# Patient Record
Sex: FEMALE | Race: WHITE | NOT HISPANIC OR LATINO | Employment: OTHER | URBAN - METROPOLITAN AREA
[De-identification: names, ages, dates, MRNs, and addresses within clinical notes are randomized per-mention and may not be internally consistent; named-entity substitution may affect disease eponyms.]

---

## 2017-01-23 ENCOUNTER — PATIENT MESSAGE (OUTPATIENT)
Dept: RHEUMATOLOGY | Facility: CLINIC | Age: 67
End: 2017-01-23

## 2017-02-09 ENCOUNTER — OFFICE VISIT (OUTPATIENT)
Dept: RHEUMATOLOGY | Facility: CLINIC | Age: 67
End: 2017-02-09
Payer: COMMERCIAL

## 2017-02-09 VITALS
DIASTOLIC BLOOD PRESSURE: 79 MMHG | WEIGHT: 187.13 LBS | SYSTOLIC BLOOD PRESSURE: 129 MMHG | BODY MASS INDEX: 31.95 KG/M2 | HEIGHT: 64 IN | HEART RATE: 65 BPM

## 2017-02-09 DIAGNOSIS — F40.10 SOCIAL ANXIETY DISORDER: ICD-10-CM

## 2017-02-09 DIAGNOSIS — M35.00 SICCA: ICD-10-CM

## 2017-02-09 DIAGNOSIS — F41.0 PANIC DISORDER: ICD-10-CM

## 2017-02-09 DIAGNOSIS — M70.62 TROCHANTERIC BURSITIS OF BOTH HIPS: ICD-10-CM

## 2017-02-09 DIAGNOSIS — I10 HYPERTENSION, ESSENTIAL: ICD-10-CM

## 2017-02-09 DIAGNOSIS — Z98.1 S/P LUMBAR SPINAL FUSION: ICD-10-CM

## 2017-02-09 DIAGNOSIS — Z98.1 S/P CERVICAL SPINAL FUSION: ICD-10-CM

## 2017-02-09 DIAGNOSIS — F33.1 MDD (MAJOR DEPRESSIVE DISORDER), RECURRENT EPISODE, MODERATE: ICD-10-CM

## 2017-02-09 DIAGNOSIS — N28.9 RENAL INSUFFICIENCY: ICD-10-CM

## 2017-02-09 DIAGNOSIS — M79.7 FIBROMYALGIA: Primary | ICD-10-CM

## 2017-02-09 DIAGNOSIS — M70.61 TROCHANTERIC BURSITIS OF BOTH HIPS: ICD-10-CM

## 2017-02-09 PROCEDURE — 99213 OFFICE O/P EST LOW 20 MIN: CPT | Mod: S$GLB,,, | Performed by: INTERNAL MEDICINE

## 2017-02-09 PROCEDURE — 1125F AMNT PAIN NOTED PAIN PRSNT: CPT | Mod: S$GLB,,, | Performed by: INTERNAL MEDICINE

## 2017-02-09 PROCEDURE — 1160F RVW MEDS BY RX/DR IN RCRD: CPT | Mod: S$GLB,,, | Performed by: INTERNAL MEDICINE

## 2017-02-09 PROCEDURE — 99999 PR PBB SHADOW E&M-EST. PATIENT-LVL IV: CPT | Mod: PBBFAC,,, | Performed by: INTERNAL MEDICINE

## 2017-02-09 PROCEDURE — 3078F DIAST BP <80 MM HG: CPT | Mod: S$GLB,,, | Performed by: INTERNAL MEDICINE

## 2017-02-09 PROCEDURE — 1159F MED LIST DOCD IN RCRD: CPT | Mod: S$GLB,,, | Performed by: INTERNAL MEDICINE

## 2017-02-09 PROCEDURE — 1157F ADVNC CARE PLAN IN RCRD: CPT | Mod: S$GLB,,, | Performed by: INTERNAL MEDICINE

## 2017-02-09 PROCEDURE — 3074F SYST BP LT 130 MM HG: CPT | Mod: S$GLB,,, | Performed by: INTERNAL MEDICINE

## 2017-02-09 RX ORDER — PREGABALIN 75 MG/1
75 CAPSULE ORAL 2 TIMES DAILY
Qty: 60 CAPSULE | Refills: 2 | Status: SHIPPED | OUTPATIENT
Start: 2017-02-09 | End: 2017-05-16 | Stop reason: SDUPTHER

## 2017-02-09 RX ORDER — DIAZEPAM 5 MG/1
5 TABLET ORAL EVERY 6 HOURS PRN
COMMUNITY
End: 2017-05-16

## 2017-02-09 RX ORDER — ASPIRIN 81 MG/1
81 TABLET ORAL DAILY
Status: ON HOLD | COMMUNITY
End: 2021-06-03 | Stop reason: HOSPADM

## 2017-02-09 RX ORDER — GABAPENTIN 300 MG/1
300 CAPSULE ORAL 2 TIMES DAILY
COMMUNITY
End: 2017-02-09

## 2017-02-09 RX ORDER — AMITRIPTYLINE HYDROCHLORIDE 25 MG/1
25 TABLET, FILM COATED ORAL NIGHTLY
COMMUNITY
End: 2020-05-28

## 2017-02-09 ASSESSMENT — ROUTINE ASSESSMENT OF PATIENT INDEX DATA (RAPID3)
PATIENT GLOBAL ASSESSMENT SCORE: 9
PAIN SCORE: 10
FATIGUE SCORE: 10
TOTAL RAPID3 SCORE: 8.66
AM STIFFNESS SCORE: 1, YES
MDHAQ FUNCTION SCORE: 2.1
PSYCHOLOGICAL DISTRESS SCORE: 7.7
WHEN YOU AWAKENED IN THE MORNING OVER THE LAST WEEK, PLEASE INDICATE THE AMOUNT OF TIME IT TAKES UNTIL YOU ARE AS LIMBER AS YOU WILL BE FOR THE DAY: 3 MINS

## 2017-02-09 NOTE — MR AVS SNAPSHOT
[facility]  [address]  [facility] [address]  Phone: [phone]  Fax: [phone]                  Missy Ennis   2017 10:00 AM   Office Visit    Description:  Female : 1950   Provider:  Kali Llamas MD   Department:  [facility]           Diagnoses this Visit        Comments    Fibromyalgia    -  Primary     S/P cervical spinal fusion         S/P lumbar spinal fusion         Renal insufficiency         Hypertension, essential         Trochanteric bursitis of both hips                To Do List           Goals (5 Years of Data)     None      Follow-Up and Disposition     Return in about 3 months (around 2017).       These Medications        Disp Refills Start End    pregabalin (LYRICA) 75 MG capsule 60 capsule 2 2017 8/10/2017    Take 1 capsule (75 mg total) by mouth 2 (two) times daily. - Oral    Pharmacy: [facility] [address] [place] [id number] Ph #: [phone]         [facility]     [facility] Care [facility] -  Assistance  Registered nurses in the [facility] provide clinical advisement, health education, appointment booking, and other advisory services.  Call for this free service at [phone].             Medications           Message regarding Medications     Verify the changes and/or additions to your medication regime listed below are the same as discussed with your clinician today.  If any of these changes or additions are incorrect, please notify your healthcare provider.        STOP taking these medications     aspirin 325 MG tablet Take 1 tablet (325 mg total) by mouth once daily.    gabapentin (NEURONTIN) 300 MG capsule Take 300 mg by mouth 2 (two) times daily.           Verify that the below list of medications is an accurate representation of the medications you are currently taking.  If none reported, the list may be blank. If incorrect, please contact your  "healthcare provider. Carry this list with you in case of emergency.           Current Medications     amitriptyline (ELAVIL) 25 MG tablet Take 25 mg by mouth nightly as needed for Insomnia.    aspirin (ECOTRIN) 81 MG EC tablet Take 81 mg by mouth once daily.    atorvastatin (LIPITOR) 40 MG tablet Take 40 mg by mouth once daily.    carvedilol (COREG) 6.25 MG tablet Take 6.25 mg by mouth 2 (two) times daily with meals.    clopidogrel (PLAVIX) 75 mg tablet Take 75 mg by mouth once daily.    diazePAM (VALIUM) 5 MG tablet Take 5 mg by mouth every 6 (six) hours as needed for Anxiety.    glucosamine-chondroitin 500-400 mg tablet Take 1 tablet by mouth 3 (three) times daily.    hydrocodone-acetaminophen 5-325mg (NORCO) 5-325 mg per tablet Take 1 tablet by mouth every 6 (six) hours as needed for Pain.    levothyroxine (SYNTHROID) 88 MCG tablet Take 88 mcg by mouth once daily.    metformin (GLUCOPHAGE) 500 MG tablet Take 1 tablet (500 mg total) by mouth daily with breakfast.    MULTIVIT-IRON-MIN-FOLIC ACID 3,500-18-0.4 UNIT-MG-MG ORAL CHEW Take by mouth.    venlafaxine 75 mg TR24 Take 75 mg by mouth once daily.    VIMPAT 100 mg Tab Take 1 tablet by mouth 2 (two) times daily.    pregabalin (LYRICA) 75 MG capsule Take 1 capsule (75 mg total) by mouth 2 (two) times daily.           Clinical Reference Information           Your Vitals Were     BP Pulse Height Weight Last Period BMI    129/79 65 5' 3.6" (1.615 m) 84.9 kg (187 lb 1.6 oz) 04/18/2016 32.52 kg/m2      Blood Pressure          Most Recent Value    BP  129/79      Allergies as of 2/9/2017     Codeine    Morphine    Sulfa (Sulfonamide Antibiotics)      Immunizations Administered on Date of Encounter - 2/9/2017     None      Orders Placed During Today's Visit      Normal Orders This Visit    Ambulatory Referral to Physical/Occupational Therapy       Instructions    - Will try to get Lyrica 75 mg BID. If approved, stop gabapentin.  - Ask PCP about switching from Effexor to " Cymbalta  - Speak with pain specialist about SI injections  - Ordered more physical therapy  - Follow up with Dr. Coello with fluid collection typical of seroma/hematoma seen on MRI  - Follow up with Dr. Llamas in 3 months  Understanding Trochanteric Bursitis    A bursa is a thin, slippery, sac-like film. It contains a small amount of fluid. This structure is found between bones and soft tissues in and around joints. A bursa cushions and protects a joint. It keeps parts of a joint from rubbing against each other. If a bursa becomes inflamed and irritated, it is known as bursitis.  The trochanteric bursa is found on the hip joint. It lies on top of the bump at the top of the thighbone called the greater trochanter. Inflammation of this bursa is called trochanteric bursitis.     How to say it  [id number]   Causes of trochanteric bursitis  Causes may include:  · Overuse of the hip during running or other sports, dance, or work  · Falling on or irritation to the side of the hip  This condition may occur along with other problems, such as osteoarthritis of the hip or knee, or low back problems. In rare cases, it may occur after hip surgery.  Symptoms of trochanteric bursitis  · Pain or aching on the side of the hip. The pain may travel down the leg.  · Swelling, tenderness, or warmth on the side of the hip at the bony bump at the top of the thigh  Treatment for trochanteric bursitis  These may include:  · Resting the hip. This allows the bursa to heal.  · Prescription or over-the-counter pain medicines. These help reduce inflammation, swelling, and pain.  · Cold packs and heat packs. These help reduce pain and swelling.  · Stretching and strengthening exercises. These improve flexibility and strength around the hip.  · Physical therapy. This includes exercises or other treatments.  · Injections of medicine into the bursa. This may help reduce inflammation and relieve symptoms.  Possible complications  If you dont  give your hip time to heal, the problem may not go away, may return, or may get worse. Rest and treat your hip as directed.     When to call your healthcare provider  Call your healthcare provider right away if you have any of these:  · Fever of 100.4°F (38°C) or higher, or as directed  · Redness, swelling, or warmth that gets worse  · Symptoms that dont get better with prescribed medicines, or get worse  · New symptoms   Date Last Reviewed: 3/29/2016  © 0603-3304 [employer]. [address] [place] [address]. All rights reserved. This information is not intended as a substitute for professional medical care. Always follow your healthcare professional's instructions.        Side Lying Hip Abduction (Strength)    1. Lie down on the floor on your side. Rest your head on your arm. Bend your legs at the knees.  2. Keep your feet together and lift your top leg up so that your knees are . Keep your hips steady.     3. Slowly lower your leg back down.  4. Repeat 10 times, or as instructed.  5. Switch sides if instructed.     Challenge yourself  Put an elastic band or tubing around your thighs. Raise and lower your top leg slowly and steadily.      Date Last Reviewed: 3/29/2016  © 6533-8628 [employer]. [address] [place] [address]. All rights reserved. This information is not intended as a substitute for professional medical care. Always follow your healthcare professional's instructions.        Iliotibial Band Stretch (Flexibility)    6. Stand next to a chair. Hold onto the chair with your right hand for support. Cross your right leg behind your left leg.  7. Lean your right hip toward the right. Feel the stretch at the outside of your hip.  8. Hold for 30 to 60 seconds. Then relax.  9. Repeat 2 times, or as instructed.  10. Switch sides and repeat.  11. Do this 3 times a day, or as instructed.     Tip: Dont bend forward or twist at the waist.   Date Last  Reviewed: 3/29/2016  © 5271-5448 [employer]. [address] [place] [address], [place] [address]. All rights reserved. This information is not intended as a substitute for professional medical care. Always follow your healthcare professional's instructions.             Language Assistance Services     ATTENTION: Language assistance services are available, free of charge. Please call [phone].      ATENCIÓN: Si habla español, tiene a agustin disposición servicios gratuitos de asistencia lingüística. Llame al [phone].     CHÚ Ý: N?u b?n nói Ti?ng Vi?t, có các d?ch v? h? tr? ngôn ng? mi?n phí dành cho b?n. G?i s? [phone].         [facility] complies with applicable Federal civil rights laws and does not discriminate on the basis of race, color, national origin, age, disability, or sex.

## 2017-02-09 NOTE — PATIENT INSTRUCTIONS
- Will try to get Lyrica 75 mg BID. If approved, stop gabapentin.  - Ask PCP about switching from Effexor to Cymbalta  - Speak with pain specialist about SI injections  - Ordered more physical therapy  - Follow up with Dr. Coello with fluid collection typical of seroma/hematoma seen on MRI  - Follow up with Dr. Llamas in 3 months  Understanding Trochanteric Bursitis    A bursa is a thin, slippery, sac-like film. It contains a small amount of fluid. This structure is found between bones and soft tissues in and around joints. A bursa cushions and protects a joint. It keeps parts of a joint from rubbing against each other. If a bursa becomes inflamed and irritated, it is known as bursitis.  The trochanteric bursa is found on the hip joint. It lies on top of the bump at the top of the thighbone called the greater trochanter. Inflammation of this bursa is called trochanteric bursitis.     How to say it  [id number]   Causes of trochanteric bursitis  Causes may include:  · Overuse of the hip during running or other sports, dance, or work  · Falling on or irritation to the side of the hip  This condition may occur along with other problems, such as osteoarthritis of the hip or knee, or low back problems. In rare cases, it may occur after hip surgery.  Symptoms of trochanteric bursitis  · Pain or aching on the side of the hip. The pain may travel down the leg.  · Swelling, tenderness, or warmth on the side of the hip at the bony bump at the top of the thigh  Treatment for trochanteric bursitis  These may include:  · Resting the hip. This allows the bursa to heal.  · Prescription or over-the-counter pain medicines. These help reduce inflammation, swelling, and pain.  · Cold packs and heat packs. These help reduce pain and swelling.  · Stretching and strengthening exercises. These improve flexibility and strength around the hip.  · Physical therapy. This includes exercises or other treatments.  · Injections of medicine  into the bursa. This may help reduce inflammation and relieve symptoms.  Possible complications  If you dont give your hip time to heal, the problem may not go away, may return, or may get worse. Rest and treat your hip as directed.     When to call your healthcare provider  Call your healthcare provider right away if you have any of these:  · Fever of 100.4°F (38°C) or higher, or as directed  · Redness, swelling, or warmth that gets worse  · Symptoms that dont get better with prescribed medicines, or get worse  · New symptoms   Date Last Reviewed: 3/29/2016  © 2888-0928 [employer]. [address] [place] [address]. All rights reserved. This information is not intended as a substitute for professional medical care. Always follow your healthcare professional's instructions.        Side Lying Hip Abduction (Strength)    1. Lie down on the floor on your side. Rest your head on your arm. Bend your legs at the knees.  2. Keep your feet together and lift your top leg up so that your knees are . Keep your hips steady.     3. Slowly lower your leg back down.  4. Repeat 10 times, or as instructed.  5. Switch sides if instructed.     Challenge yourself  Put an elastic band or tubing around your thighs. Raise and lower your top leg slowly and steadily.      Date Last Reviewed: 3/29/2016  © 7333-4756 [employer]. [address] [place] [address]. All rights reserved. This information is not intended as a substitute for professional medical care. Always follow your healthcare professional's instructions.        Iliotibial Band Stretch (Flexibility)    6. Stand next to a chair. Hold onto the chair with your right hand for support. Cross your right leg behind your left leg.  7. Lean your right hip toward the right. Feel the stretch at the outside of your hip.  8. Hold for 30 to 60 seconds. Then relax.  9. Repeat 2 times, or as instructed.  10. Switch sides and  repeat.  11. Do this 3 times a day, or as instructed.     Tip: Dont bend forward or twist at the waist.   Date Last Reviewed: 3/29/2016  © 0065-4088 [employer]. [address] [place] [address], [place] [address]. All rights reserved. This information is not intended as a substitute for professional medical care. Always follow your healthcare professional's instructions.

## 2017-02-09 NOTE — PROGRESS NOTES
"Subjective:       Patient ID: Missy Ennis is a 67 y.o. female.    Chief Complaint: No chief complaint on file.    HPI   Pt is a 68 y/o female with a h/o OA of the hips, knees, and hand; s/p left TKA; s/p C4-C7 fusion; s/p L4-S1 fusion and surgical site seroma/hematoma; fibromyalgia; HTN; hypothyroidism; HLD; VENKAT on CPAP; s/p right MCA embolic stroke; CKD; MDD, panic d/o, social anxiety d/o; and T2DM who presents for follow up visit. At her last visit on 10/21/16, she was diagnosed with fibromyalgia and it was recommended for her to start Lyrica 75 mg BID. However, her insurance has not approved it. She is taking gabapentin 600 mg BID and cannot tolerate a higher dose due to side effects (made her "loopy"). She does not feel like gabapentin is helping her fibromyalgia pain. She is Effexor given to her by her PCP for neuropathic pain. She has not tried taking Cymbalta. Today, she reports that she is having pain in her low back and the lateral aspects of both hips. The hip pain makes it painful to lie on either side. She has been getting dry needling of the IT band at PT. She has not ever received trochanteric bursa injections. Her blood glucose was 175 this morning. She has not seen Dr. Coello for the fluid collection seen in her back on MRI which was typical of seroma/hematoma.       Review of Systems   Constitutional: Negative for chills, fever and unexpected weight change.   HENT: Positive for trouble swallowing. Negative for mouth sores.         Dry mouth   Eyes: Negative for pain and redness.        Dry eyes   Respiratory: Positive for shortness of breath. Negative for cough and choking.    Cardiovascular: Negative for chest pain.   Gastrointestinal: Negative for abdominal pain, blood in stool, constipation, diarrhea and nausea.        Heartburn   Endocrine: Negative for cold intolerance and heat intolerance.   Genitourinary: Negative for dysuria, genital sores and hematuria.   Musculoskeletal: Negative for " myalgias.   Skin: Negative for color change, pallor and rash.   Neurological: Positive for headaches. Negative for weakness and numbness.   Hematological: Negative for adenopathy. Does not bruise/bleed easily.   Psychiatric/Behavioral: Negative for dysphoric mood and suicidal ideas.         Objective:     Visit Vitals    LMP 04/18/2016        Physical Exam   Constitutional: She is oriented to person, place, and time and well-developed, well-nourished, and in no distress. No distress.   HENT:   Head: Normocephalic and atraumatic.   Eyes: Conjunctivae and EOM are normal. Pupils are equal, round, and reactive to light. Right eye exhibits no discharge. Left eye exhibits no discharge. No scleral icterus.   Neck: Normal range of motion.   Cardiovascular: Normal rate, regular rhythm, normal heart sounds and intact distal pulses.  Exam reveals no gallop and no friction rub.    No murmur heard.  Pulmonary/Chest: Effort normal and breath sounds normal. No respiratory distress. She has no wheezes. She has no rales.   Abdominal: Soft. Bowel sounds are normal. She exhibits no distension. There is no tenderness.       Right Side Rheumatological Exam     Examination finds the shoulder, elbow, wrist, knee, 1st PIP, 1st MCP, 2nd PIP, 2nd MCP, 3rd PIP, 3rd MCP, 4th PIP, 4th MCP, 5th PIP and 5th MCP normal.    Hip Exam   Tenderness Location: greater trochanter    Muscle Strength (0-5 scale):  Neck Flexion:  5  Neck Extension: 5  Deltoid:  4.6  Biceps: 4.8/5   Triceps:  4.8  : 5/5   Iliopsoas: 4.4  Quadriceps:  5   Distal Lower Extremity: 5    Left Side Rheumatological Exam     Examination finds the shoulder, elbow, wrist, knee, 1st PIP, 1st MCP, 2nd PIP, 2nd MCP, 3rd PIP, 3rd MCP, 4th PIP, 4th MCP, 5th PIP and 5th MCP normal.    Hip Exam   Tenderness Location: greater trochanter    Muscle Strength (0-5 scale):  Neck Flexion:  5  Neck Extension: 5  Deltoid:  4.8  Biceps: 4.8/5   Triceps:  4.8  :  5/5   Iliopsoas:  4.4  Quadriceps:  5   Distal Lower Extremity: 5      Back/Neck Exam   Tenderness Right paramedian tenderness of the SI Joint.Left paramedian tenderness of the SI Joint.      Comments:  Positive ERWIN bilaterally.    Neurological: She is alert and oriented to person, place, and time. She has normal reflexes. A cranial nerve deficit is present. Gait normal.   Right Henning's Sign:  absent  Left Henning's Sign:  Absent  Reflex Scores:       Tricep reflexes are 2+ on the right side and 2+ on the left side.       Bicep reflexes are 2+ on the right side and 2+ on the left side.       Brachioradialis reflexes are 2+ on the right side and 2+ on the left side.       Patellar reflexes are 2+ on the right side and 2+ on the left side.       Achilles reflexes are 2+ on the right side and 2+ on the left side.  Facial droop   Skin: Skin is warm and dry. No rash noted. She is not diaphoretic. No erythema.     Psychiatric: Mood, memory, affect and judgment normal.   Musculoskeletal: Normal range of motion.           Assessment:       1. Fibromyalgia    2. S/P cervical spinal fusion    3. S/P lumbar spinal fusion    4. Renal insufficiency    5. Hypertension, essential    6. Trochanteric bursitis of both hips            Plan:       - Will attempt to go through [facility] for Lyrica 75 mg BID. Patient has failed gabapentin 600 mg BID and cannot tolerate a higher dose.  - Patient will speak with PCP about switching from Effexor to Cymbalta  - Weight reduction  - Continue glucosamine-chondroitin 500-400 tid  - Due to elevated blood glucose of 175 this morning, will not inject trochanteric bursae today, but may consider in the future. Patient provided with handouts on strengthening and stretching exercises for trochanteric bursitis.  - Follow up with pain specialist for evaluation for SI joint injections.  - Provided with orders for continued physical therapy  - F/u Dr. Coello for post lumbar fusion left lumbar  radiculopathy and check regarding the seroma/hematoma noted on MRI from Feb/  - RTC 3 months

## 2017-02-09 NOTE — PROGRESS NOTES
I have personally taken the history and examined the patient and agree with the resident,s note as stated above        Results for YESSY HYATT (MRN [id number]) as of 2/9/2017 12:43   Ref. Range 10/21/2016 14:43 11/3/2016 15:00 11/7/2016 13:28   WBC Latest Ref Range: 3.90 - 12.70 K/uL 6.90     RBC Latest Ref Range: 4.00 - 5.40 M/uL 4.30     Hemoglobin Latest Ref Range: 12.0 - 16.0 g/dL 13.3     Hematocrit Latest Ref Range: 37.0 - 48.5 % 39.0     MCV Latest Ref Range: 82 - 98 fL 91     MCH Latest Ref Range: 27.0 - 31.0 pg 30.9     MCHC Latest Ref Range: 32.0 - 36.0 % 34.1     RDW Latest Ref Range: 11.5 - 14.5 % 13.6     Platelets Latest Ref Range: 150 - 350 K/uL 261     MPV Latest Ref Range: 9.2 - 12.9 fL 11.1     Gran% Latest Ref Range: 38.0 - 73.0 % 64.2     Gran # Latest Ref Range: 1.8 - 7.7 K/uL 4.4     Lymph% Latest Ref Range: 18.0 - 48.0 % 19.9     Lymph # Latest Ref Range: 1.0 - 4.8 K/uL 1.4     Mono% Latest Ref Range: 4.0 - 15.0 % 8.1     Mono # Latest Ref Range: 0.3 - 1.0 K/uL 0.6     Eosinophil% Latest Ref Range: 0.0 - 8.0 % 6.7     Eos # Latest Ref Range: 0.0 - 0.5 K/uL 0.5     Basophil% Latest Ref Range: 0.0 - 1.9 % 1.0     Baso # Latest Ref Range: 0.00 - 0.20 K/uL 0.07     Sed Rate Latest Ref Range: 0 - 20 mm/Hr 17     Sodium Latest Ref Range: 136 - 145 mmol/L 139     Potassium Latest Ref Range: 3.5 - 5.1 mmol/L 3.9     Chloride Latest Ref Range: 95 - 110 mmol/L 101     CO2 Latest Ref Range: 23 - 29 mmol/L 28     Anion Gap Latest Ref Range: 8 - 16 mmol/L 10     BUN, Bld Latest Ref Range: 8 - 23 mg/dL 13     Creatinine Latest Ref Range: 0.5 - 1.4 mg/dL 0.8     eGFR if non African American Latest Ref Range: >60 mL/min/1.73 m^2 >60.0     eGFR if African American Latest Ref Range: >60 mL/min/1.73 m^2 >60.0     Glucose Latest Ref Range: 70 - 110 mg/dL 119 (H)     Calcium Latest Ref Range: 8.7 - 10.5 mg/dL 9.9     Alkaline Phosphatase Latest Ref Range: 55 - 135 U/L 116     Total Protein Latest Ref Range: 6.0  - 8.4 g/dL 6.8     Albumin Latest Ref Range: 3.5 - 5.2 g/dL 3.9     Total Bilirubin Latest Ref Range: 0.1 - 1.0 mg/dL 0.3     AST Latest Ref Range: 10 - 40 U/L 22     ALT Latest Ref Range: 10 - 44 U/L 29     CRP Latest Ref Range: 0.0 - 8.2 mg/L 4.1     CPK Latest Ref Range: 20 - 180 U/L 127     Vit D, 25-Hydroxy Latest Ref Range: 30 - 96 ng/mL 36     Anti-SSA Antibody Latest Ref Range: 0.00 - 19.99 EU 0.75     Anti-SSA Interpretation Latest Ref Range: Negative  Negative     Anti-SSB Antibody Latest Ref Range: 0.00 - 19.99 EU 0.08     Anti-SSB Interpretation Latest Ref Range: Negative  Negative     CCP Antibodies Latest Ref Range: <5.0 U/mL <0.5     Rheumatoid Factor Latest Ref Range: 0.0 - 15.0 IU/mL <10.0     XRAY PREVIOUS Unknown  Rpt    DEXA BONE DENSITY SPINE HIP Unknown   Rpt   MARYCHUY Screen Latest Ref Range: Negative <1:160  Negative <1:160     Differential Method Unknown Automated       The bone density is normal. RJQ   External Result Report   External Result Report   Narrative   : 1950 ORDERING PHYSICIAN: ION Llamas LOCATION: [facility] [place]    HISTORY: 65 y/o female with no hx of fractures. She had surgical menopause at 46 y/o. She is taking Calcium and Vit D supplements. She has a hx of Diabetes. She does not exercise or smoke.    TECHNIQUE: Bone Mineral Density performed using Hologic Horizon A (S/N [id number]) reveals good positioning of lumbar spine and hip.    BONE MINERAL DENSITY RESULTS:  Lumbar Spine: Lumbar bone mineral density L1-L4 is 0.529g/cm2, which is a t-score of -0.9. The z-score is 0.8.    Total Hip: The total hip bone mineral density is 0.874g/cm2.  The t-score is -0.6, and the z-score is 0.8.  Femoral neck BMD is 0.794g/cm2 and the t-score is -0.5.      COMPARISONS: No Previous studies available.   Impression       Normal BMD of total hip and 1/3 distal radius.     RECOMMENDATIONS:  1) Adequate calcium and Vitamin D therapy  2) Appropriate exercise  3) No need to repeat BMD in near  future unless clinical change.     EXPLANATION OF RESULTS:  The t-score compares this results to the bone density of a 25 year old of the same gender. The z-score compares this result to the average bone density to people of the same age and gender. The amounts indicate the number of standard deviations above or below the mean.  * Osteoporosis is generally defined as having a t-score between less than -2.5.  * Osteopenia is generally defined as having a t-score between -1 and -2.5.  * The normal range is generally defined as having a t-score between -1 to 1.      Electronically signed by: ANDRE MAXWELL MD  Date: 11/08/16  Time: 15:50     Encounter   View Encounter      Reviewed By   Kali Llamas MD on 11/8/2016  4:09 PM   Exam Details   Performed Procedure Technologist Supporting Staff Performing Physician   DXA Bone Density Spine And Hip_Axial Skeleton Mary Crespo               post lumbar fusion L4-S1 with recurrent left lumbar radiculopathy, surgical site seroma/hematoma  S/p cervical fusion C4-7  s/p left TKA  OA hips, right knee, hands  s/p right mca embolic stroke   Mild carotid stenosis  T 2 DM  H/o MDD, panic disorder, social anxiety disorder insomnia, chronic pain  Hypothyroidism, Hashimoto's  vitiligo  Hypertension, controlled  Hyperlipidemia  Metabolic syndrome  VENKAT, on CPAP  Obesity  VENKAT        extend PT(ext)   Forward recent hip x-rays  Stop gabapentin and change to pregabalin starting 75mg twice daily and adjust as necessary(sent through [facility], co-pay card)  Dr. Valenzuela to consider changing venlafaxine to duloxetine to treat fibromyalgia, chronic musculoskeletal pain and depression.  Weight reduction  Glucosamine-chondroitin 500-400 tid  Fibromyalgia packet  F/u Dr. Coello for post lumbar fusion left lumbar radiculopathy and check regarding the seroma/hematoma noted on MRI from Feb/  RTC 3 months

## 2017-02-14 ENCOUNTER — PATIENT MESSAGE (OUTPATIENT)
Dept: RHEUMATOLOGY | Facility: CLINIC | Age: 67
End: 2017-02-14

## 2017-02-17 ENCOUNTER — TELEPHONE (OUTPATIENT)
Dept: RHEUMATOLOGY | Facility: CLINIC | Age: 67
End: 2017-02-17

## 2017-02-17 NOTE — TELEPHONE ENCOUNTER
Received office note 2/15/17 Dr. Thomas Richards @ [facility]:    T2DM  Cerebrovascular disease, left hemiparesis  Hypothyroidism  Dysthymic disorder  Dorsalgia; low back pain  Right trochanteric bursitis    Change Effexor to Cymbalta  Change gabapentin to Lyrica  OT RX  DM management      Labs 2/7/17:     Cbc: normal alc 1200 anc 4400  Cmp: glucose 194; creat 0.82 eGFR 74 otherwise normal    HDL 57 LDL 72  TSH, FTI normal  Microalbumin: < 7.4  HbA1C 7.9

## 2017-05-16 ENCOUNTER — HOSPITAL ENCOUNTER (OUTPATIENT)
Dept: RADIOLOGY | Facility: HOSPITAL | Age: 67
Discharge: HOME | End: 2017-05-16
Attending: INTERNAL MEDICINE
Payer: COMMERCIAL

## 2017-05-16 ENCOUNTER — PATIENT MESSAGE (OUTPATIENT)
Dept: RHEUMATOLOGY | Facility: CLINIC | Age: 67
End: 2017-05-16

## 2017-05-16 ENCOUNTER — TELEPHONE (OUTPATIENT)
Dept: RHEUMATOLOGY | Facility: CLINIC | Age: 67
End: 2017-05-16

## 2017-05-16 ENCOUNTER — OFFICE VISIT (OUTPATIENT)
Dept: RHEUMATOLOGY | Facility: CLINIC | Age: 67
End: 2017-05-16
Payer: COMMERCIAL

## 2017-05-16 VITALS
WEIGHT: 185.88 LBS | HEART RATE: 80 BPM | DIASTOLIC BLOOD PRESSURE: 82 MMHG | HEIGHT: 64 IN | SYSTOLIC BLOOD PRESSURE: 132 MMHG | BODY MASS INDEX: 31.73 KG/M2

## 2017-05-16 DIAGNOSIS — M35.00 SICCA, UNSPECIFIED TYPE: ICD-10-CM

## 2017-05-16 DIAGNOSIS — M25.551 BILATERAL HIP PAIN: Primary | ICD-10-CM

## 2017-05-16 DIAGNOSIS — E03.9 ACQUIRED HYPOTHYROIDISM: ICD-10-CM

## 2017-05-16 DIAGNOSIS — M25.552 BILATERAL HIP PAIN: ICD-10-CM

## 2017-05-16 DIAGNOSIS — Z98.1 S/P CERVICAL SPINAL FUSION: ICD-10-CM

## 2017-05-16 DIAGNOSIS — M79.7 FIBROMYALGIA: ICD-10-CM

## 2017-05-16 DIAGNOSIS — M25.552 BILATERAL HIP PAIN: Primary | ICD-10-CM

## 2017-05-16 DIAGNOSIS — Z98.1 S/P LUMBAR SPINAL FUSION: ICD-10-CM

## 2017-05-16 DIAGNOSIS — L80 VITILIGO: ICD-10-CM

## 2017-05-16 DIAGNOSIS — I63.411 EMBOLIC STROKE INVOLVING RIGHT MIDDLE CEREBRAL ARTERY: ICD-10-CM

## 2017-05-16 DIAGNOSIS — M25.551 BILATERAL HIP PAIN: ICD-10-CM

## 2017-05-16 PROCEDURE — 1159F MED LIST DOCD IN RCRD: CPT | Mod: S$GLB,,, | Performed by: INTERNAL MEDICINE

## 2017-05-16 PROCEDURE — 3079F DIAST BP 80-89 MM HG: CPT | Mod: S$GLB,,, | Performed by: INTERNAL MEDICINE

## 2017-05-16 PROCEDURE — 1160F RVW MEDS BY RX/DR IN RCRD: CPT | Mod: S$GLB,,, | Performed by: INTERNAL MEDICINE

## 2017-05-16 PROCEDURE — 73521 X-RAY EXAM HIPS BI 2 VIEWS: CPT | Mod: TC

## 2017-05-16 PROCEDURE — 1125F AMNT PAIN NOTED PAIN PRSNT: CPT | Mod: S$GLB,,, | Performed by: INTERNAL MEDICINE

## 2017-05-16 PROCEDURE — 73521 X-RAY EXAM HIPS BI 2 VIEWS: CPT | Mod: 26,,, | Performed by: RADIOLOGY

## 2017-05-16 PROCEDURE — 3075F SYST BP GE 130 - 139MM HG: CPT | Mod: S$GLB,,, | Performed by: INTERNAL MEDICINE

## 2017-05-16 PROCEDURE — 99214 OFFICE O/P EST MOD 30 MIN: CPT | Mod: S$GLB,,, | Performed by: INTERNAL MEDICINE

## 2017-05-16 PROCEDURE — 99999 PR PBB SHADOW E&M-EST. PATIENT-LVL IV: CPT | Mod: PBBFAC,,, | Performed by: INTERNAL MEDICINE

## 2017-05-16 PROCEDURE — 1157F ADVNC CARE PLAN IN RCRD: CPT | Mod: 8P,S$GLB,, | Performed by: INTERNAL MEDICINE

## 2017-05-16 RX ORDER — DULOXETIN HYDROCHLORIDE 60 MG/1
60 CAPSULE, DELAYED RELEASE ORAL DAILY
Qty: 30 CAPSULE | Refills: 2 | Status: SHIPPED | OUTPATIENT
Start: 2017-05-16 | End: 2017-08-22 | Stop reason: SDUPTHER

## 2017-05-16 RX ORDER — GABAPENTIN 100 MG/1
300 CAPSULE ORAL 2 TIMES DAILY
COMMUNITY
End: 2019-07-09 | Stop reason: DRUGHIGH

## 2017-05-16 RX ORDER — DULOXETIN HYDROCHLORIDE 30 MG/1
30 CAPSULE, DELAYED RELEASE ORAL DAILY
COMMUNITY
End: 2017-05-16

## 2017-05-16 RX ORDER — PREGABALIN 75 MG/1
75 CAPSULE ORAL 2 TIMES DAILY
Qty: 60 CAPSULE | Refills: 2 | Status: SHIPPED | OUTPATIENT
Start: 2017-05-16 | End: 2017-06-06 | Stop reason: SDUPTHER

## 2017-05-16 ASSESSMENT — ROUTINE ASSESSMENT OF PATIENT INDEX DATA (RAPID3)
WHEN YOU AWAKENED IN THE MORNING OVER THE LAST WEEK, PLEASE INDICATE THE AMOUNT OF TIME IT TAKES UNTIL YOU ARE AS LIMBER AS YOU WILL BE FOR THE DAY: 1 HR
PATIENT GLOBAL ASSESSMENT SCORE: 10
PSYCHOLOGICAL DISTRESS SCORE: 5.5
TOTAL RAPID3 SCORE: 8.66
MDHAQ FUNCTION SCORE: 1.8
FATIGUE SCORE: 10
PAIN SCORE: 10
AM STIFFNESS SCORE: 1, YES

## 2017-05-16 NOTE — PROGRESS NOTES
"Subjective:       Patient ID: Missy Ennis is a 67 y.o. female.    Chief Complaint: fibromyalgia, s/p cervical and lumbar fusion, trochanteric bursitis, generalized OA    HPI  Attended PT and great benefit with "dry needling" and exercise. States was nearly pain free x 3 wks. Worse since insurance stopped covering PT. Also Dr. Richards stopped venlafaxine and changed to duloxetine 30mg daily which she continues. The pregabalin apparently never received from [facility]. So she is now on gabapentin 100mg qid instead which is not helping. No aerobic exercise at present since PT ended. Has lost 2 #. Had trochanteric bursal injections in past, one helped 24 h one helped 1  Wk.  Review of Systems   Constitutional: Positive for fatigue. Negative for appetite change, fever and unexpected weight change.   HENT: Negative for mouth sores.         Dry mouth   Eyes: Negative for visual disturbance.   Respiratory: Negative for cough, shortness of breath and wheezing.    Cardiovascular: Negative for chest pain and palpitations.   Gastrointestinal: Negative for abdominal pain, anal bleeding, blood in stool, constipation, diarrhea, nausea and vomiting.   Genitourinary: Negative for dysuria, frequency and urgency.   Musculoskeletal: Negative for arthralgias, back pain, gait problem, joint swelling, myalgias, neck pain and neck stiffness.   Skin: Negative for rash.   Neurological: Positive for numbness. Negative for weakness and headaches.   Hematological: Negative for adenopathy. Does not bruise/bleed easily.   Psychiatric/Behavioral: Negative for sleep disturbance. The patient is not nervous/anxious.          Objective:   /82  Pulse 80  Ht 5' 3.6" (1.615 m)  Wt 84.3 kg (185 lb 14.4 oz)  LMP 04/18/2016  BMI 32.31 kg/m2     Physical Exam   Constitutional: She is oriented to person, place, and time and well-developed, well-nourished, and in no distress.   HENT:   Head: Normocephalic and atraumatic. "   Mouth/Throat: Oropharynx is clear and moist.   Eyes: Conjunctivae and EOM are normal.   Neck: Normal range of motion. Neck supple. No thyromegaly present.   Cardiovascular: Normal rate, regular rhythm, normal heart sounds and intact distal pulses.  Exam reveals no gallop and no friction rub.    No murmur heard.  Pulmonary/Chest: Breath sounds normal. She has no wheezes. She has no rales. She exhibits no tenderness.   Abdominal: Soft. She exhibits no distension and no mass. There is no tenderness.   obese       Right Side Rheumatological Exam     Examination finds the shoulder, elbow, wrist, knee, 1st MCP, 2nd PIP, 2nd MCP, 3rd PIP, 3rd MCP, 4th PIP, 4th MCP, 5th PIP and 5th MCP normal.    The patient has an enlarged 1st PIP, 1st CMC and 2nd DIP    Knee Exam   Patellofemoral Crepitus: positive  Effusion: negative  Warmth: negative    Hip Exam   Tenderness Location: greater trochanter    Range of Motion   Extension: normal   Flexion: normal   Internal Rotation: normal   External Rotation: normal   Abduction: normal   Adduction: normal     Left Side Rheumatological Exam     Examination finds the shoulder, elbow, wrist, knee, 1st MCP, 2nd PIP, 2nd MCP, 3rd PIP, 3rd MCP, 4th PIP, 4th MCP, 5th PIP and 5th MCP normal.    The patient has an enlarged 1st PIP, 1st CMC and 2nd DIP.    Knee Exam     Range of Motion   Extension: normal   Flexion: normal   Effusion: negative  Warmth: negative    Hip Exam   Tenderness Location: greater trochanter    Range of Motion   Extension: normal   Flexion: normal   Internal Rotation: normal   External Rotation: normal   Abduction: normal       Lymphadenopathy:     She has no cervical adenopathy.   Neurological: She is alert and oriented to person, place, and time. She displays normal reflexes. Gait normal.   Nl motor strength UE and LE bilateral   Musculoskeletal: She exhibits no edema.           Assessment:     post lumbar fusion L4-S1 with recurrent left lumbar radiculopathy, surgical  site seroma/hematoma  S/p cervical fusion C4-7  s/p left TKA  OA hips, right knee, hands  s/p right mca embolic stroke   Mild carotid stenosis  T 2 DM  H/o MDD, panic disorder, social anxiety disorder insomnia, chronic pain  Hypothyroidism, Hashimoto's  vitiligo  Hypertension, controlled  Hyperlipidemia  Metabolic syndrome  VENKAT, on CPAP  Obesity lost 2#  VENKAT             Plan:       extend PT(ext)  Including dry needling; then maintenance exercise with arthritis aquatics, treadmill, elliptical, stationary bike + TaiChi, yoga  Ap pelvis and hip x-rays today  change from gabapentin  to pregabalin starting 75mg twice daily and adjust as necessary(sent through [facility], co-pay card) once approved  Increase duloxetine to 60mg daily  Continue Weight reduction  Glucosamine-chondroitin 500-400 tid  Fibromyalgia packet  F/u Dr. Coello for post lumbar fusion left lumbar radiculopathy and check regarding the seroma/hematoma noted on MRI from Feb/  RTC 3 months

## 2017-05-16 NOTE — MR AVS SNAPSHOT
[facility]  [address]  [facility] [address]  Phone: [phone]  Fax: [phone]                  Missy Ennis   2017 2:00 PM   Office Visit    Description:  Female : 1950   Provider:  Kali Llamas MD   Department:  [facility]           Diagnoses this Visit        Comments    Bilateral hip pain    -  Primary     Fibromyalgia         Vitiligo         Sicca, unspecified type         S/P cervical spinal fusion         S/P lumbar spinal fusion         Acquired hypothyroidism         Embolic stroke involving right middle cerebral artery                To Do List           Goals (5 Years of Data)     None      Follow-Up and Disposition     Return in about 3 months (around 2017).       These Medications        Disp Refills Start End    pregabalin (LYRICA) 75 MG capsule 60 capsule 2 2017    Take 1 capsule (75 mg total) by mouth 2 (two) times daily. - Oral    Pharmacy: [address] Ph #: [phone]       duloxetine (CYMBALTA) 60 MG capsule 30 capsule 2 2017    Take 1 capsule (60 mg total) by mouth once daily. - Oral    Pharmacy: [address] Ph #: [phone]         [facility]     [facility] Care Line - 24/ Assistance  Unless otherwise directed by your provider, please contact [facility], our nurse care line that is available for / assistance.     Registered nurses in the [facility] Center provide: appointment scheduling, clinical advisement, health education, and other advisory services.  Call: [phone] (toll free)               Medications           Message regarding Medications     Verify the changes and/or additions to your medication regime listed below are the same as discussed with your clinician today.  If any of these changes or additions are incorrect, please  notify your healthcare provider.        START taking these NEW medications        Refills    duloxetine (CYMBALTA) 60 MG capsule 2    Sig: Take 1 capsule (60 mg total) by mouth once daily.    Class: Normal    Route: Oral      STOP taking these medications     clopidogrel (PLAVIX) 75 mg tablet Take 75 mg by mouth once daily.    diazePAM (VALIUM) 5 MG tablet Take 5 mg by mouth every 6 (six) hours as needed for Anxiety.    hydrocodone-acetaminophen 5-325mg (NORCO) 5-325 mg per tablet Take 1 tablet by mouth every 6 (six) hours as needed for Pain.    metformin (GLUCOPHAGE) 500 MG tablet Take 1 tablet (500 mg total) by mouth daily with breakfast.    venlafaxine 75 mg TR24 Take 75 mg by mouth once daily.    VIMPAT 100 mg Tab Take 1 tablet by mouth 2 (two) times daily.           Verify that the below list of medications is an accurate representation of the medications you are currently taking.  If none reported, the list may be blank. If incorrect, please contact your healthcare provider. Carry this list with you in case of emergency.           Current Medications     amitriptyline (ELAVIL) 25 MG tablet Take 25 mg by mouth nightly as needed for Insomnia.    aspirin (ECOTRIN) 81 MG EC tablet Take 81 mg by mouth once daily.    atorvastatin (LIPITOR) 40 MG tablet Take 40 mg by mouth once daily.    carvedilol (COREG) 6.25 MG tablet Take 6.25 mg by mouth 2 (two) times daily with meals.    empagliflozin (JARDIANCE) 25 mg Tab Take by mouth.    gabapentin (NEURONTIN) 100 MG capsule Take 100 mg by mouth 4 (four) times daily.    glucosamine-chondroitin 500-400 mg tablet Take 1 tablet by mouth 3 (three) times daily.    levothyroxine (SYNTHROID) 88 MCG tablet Take 88 mcg by mouth once daily.    MULTIVIT-IRON-MIN-FOLIC ACID 3,500-18-0.4 UNIT-MG-MG ORAL CHEW Take by mouth.    duloxetine (CYMBALTA) 60 MG capsule Take 1 capsule (60 mg total) by mouth once daily.    pregabalin (LYRICA) 75 MG capsule Take 1 capsule (75 mg total) by mouth 2  "(two) times daily.           Clinical Reference Information           Your Vitals Were     BP Pulse Height Weight Last Period BMI    132/82 80 5' 3.6" (1.615 m) 84.3 kg (185 lb 14.4 oz) 04/18/2016 32.31 kg/m2      Blood Pressure          Most Recent Value    BP  132/82      Allergies as of 5/16/2017     Codeine    Morphine    Sulfa (Sulfonamide Antibiotics)      Immunizations Administered on Date of Encounter - 5/16/2017     None      Orders Placed During Today's Visit      Normal Orders This Visit    Ambulatory Referral to Physical/Occupational Therapy     Future Labs/Procedures Expected by Expires    X-Ray Hips Bilateral 2 View Inc AP Pelvis  5/16/2017 5/16/2018      Instructions    Lyrica faxed to [facility], if don't hear in 48 hrs, call Vandana [phone] or message.        Language Assistance Services     ATTENTION: Language assistance services are available, free of charge. Please call [phone].      ATENCIÓN: Si habla español, tiene a agustin disposición servicios gratuitos de asistencia lingüística. Llame al [phone].     [facility] Ý: N?u b?n nói Ti?ng Vi?t, có các d?ch v? h? tr? ngôn ng? mi?n phí dành cho b?n. G?i s? [phone].         [facility] complies with applicable Federal civil rights laws and does not discriminate on the basis of race, color, national origin, age, disability, or sex.        "

## 2017-05-16 NOTE — PATIENT INSTRUCTIONS
Lyrica faxed to [facility], if don't hear in 48 hrs, call Vandana [phone] or message.

## 2017-05-16 NOTE — TELEPHONE ENCOUNTER
Message from daughter April:     Please note that I am unable to attend Missy's appt today, my stepfather is bringing her.  I know my mother's explanation of her HX and symptoms can be very chaotic, so I wish to offer the following notes for today's visit:       Her hip pain HAD subsided considerably with physical therapy and change of meds to Cymbalta.  But since her insurance discontinued her therapy coverage, her pain has increasingly became less tolerable.  She did not attended any of the occupation therapy, she will tell you it is an insurance issue-I am telling you it is not.  She has become more scattered with her thoughts and forgetful of how to do everyday activities and occurrences.  If you could impress upon her the importance of OT, I would appreciate it.     We have not received any word on the RX for Lyrica- which you were trying to run thru [facility] at last visit.       Since last visit:   She has seen Dr. Leos, neurologist, for a RX refill for Vimpat.  (I did not attend-but at that appt the  X-rayed her foot and did a TCD scan of her brain)   I DID attend follow up appt: TCD showed blood flow good,  recommend to discontinue Plavix; xray of foot showed normal arthritis in joints.       She has seen Dr. Richards, GP: He recommended discontinue Metformin to new diabetic RX (I do not have the name)       Please do not hesitate to contact me [phone].   Thank you, Soraya Clement.                                           Responsible Party

## 2017-06-06 ENCOUNTER — PATIENT MESSAGE (OUTPATIENT)
Dept: RHEUMATOLOGY | Facility: CLINIC | Age: 67
End: 2017-06-06

## 2017-06-06 DIAGNOSIS — M79.7 FIBROMYALGIA: ICD-10-CM

## 2017-06-06 RX ORDER — PREGABALIN 75 MG/1
75 CAPSULE ORAL 2 TIMES DAILY
Qty: 60 CAPSULE | Refills: 2 | Status: SHIPPED | OUTPATIENT
Start: 2017-06-06 | End: 2017-10-02 | Stop reason: SDUPTHER

## 2017-06-19 ENCOUNTER — PATIENT MESSAGE (OUTPATIENT)
Dept: RHEUMATOLOGY | Facility: CLINIC | Age: 67
End: 2017-06-19

## 2017-07-03 ENCOUNTER — TELEPHONE (OUTPATIENT)
Dept: RHEUMATOLOGY | Facility: CLINIC | Age: 67
End: 2017-07-03

## 2017-07-03 NOTE — TELEPHONE ENCOUNTER
Jaime, she needs to see her primary MD or orthopedist for post traumatic hip pain to be sure no occult fracture or other process. Thanks. JUVENCIO

## 2017-07-03 NOTE — TELEPHONE ENCOUNTER
Pt. Reports that she had a fall on last Thursday, and is now having pain between the hip and groin area on right side, and wandering what she should do. I verbalized to patient that Dr. Llamas is out of the office until Wednesday, and that she should call her PCP to see if they can see her today, or go to the ER,because she may need an x-ray. Pt. Verbalized understanding.

## 2017-08-22 DIAGNOSIS — M79.7 FIBROMYALGIA: ICD-10-CM

## 2017-08-22 RX ORDER — DULOXETIN HYDROCHLORIDE 60 MG/1
60 CAPSULE, DELAYED RELEASE ORAL DAILY
Qty: 30 CAPSULE | Refills: 2 | Status: SHIPPED | OUTPATIENT
Start: 2017-08-22 | End: 2019-05-07 | Stop reason: SDUPTHER

## 2017-09-06 ENCOUNTER — PATIENT MESSAGE (OUTPATIENT)
Dept: RHEUMATOLOGY | Facility: CLINIC | Age: 67
End: 2017-09-06

## 2017-10-02 DIAGNOSIS — M79.7 FIBROMYALGIA: ICD-10-CM

## 2017-10-02 RX ORDER — PREGABALIN 75 MG/1
75 CAPSULE ORAL 2 TIMES DAILY
Qty: 180 CAPSULE | Refills: 0 | Status: SHIPPED | OUTPATIENT
Start: 2017-10-02 | End: 2018-12-04

## 2017-10-03 ENCOUNTER — TELEPHONE (OUTPATIENT)
Dept: RHEUMATOLOGY | Facility: CLINIC | Age: 67
End: 2017-10-03

## 2018-12-04 ENCOUNTER — HOSPITAL ENCOUNTER (INPATIENT)
Facility: HOSPITAL | Age: 68
LOS: 6 days | Discharge: NURSING, REHAB OR LONG-TERM CARE | DRG: 065 | End: 2018-12-10
Attending: EMERGENCY MEDICINE | Admitting: PSYCHIATRY & NEUROLOGY
Payer: MEDICARE

## 2018-12-04 DIAGNOSIS — I63.9 CEREBELLAR STROKE, ACUTE: Primary | ICD-10-CM

## 2018-12-04 DIAGNOSIS — I63.411 EMBOLIC STROKE INVOLVING RIGHT MIDDLE CEREBRAL ARTERY: ICD-10-CM

## 2018-12-04 DIAGNOSIS — I63.9 CEREBROVASCULAR ACCIDENT (CVA), UNSPECIFIED MECHANISM: ICD-10-CM

## 2018-12-04 DIAGNOSIS — G93.89 BRAIN MASS: ICD-10-CM

## 2018-12-04 DIAGNOSIS — I63.9 STROKE: ICD-10-CM

## 2018-12-04 DIAGNOSIS — E03.9 ACQUIRED HYPOTHYROIDISM: ICD-10-CM

## 2018-12-04 DIAGNOSIS — I10 HYPERTENSION, ESSENTIAL: ICD-10-CM

## 2018-12-04 DIAGNOSIS — E11.9 CONTROLLED TYPE 2 DIABETES MELLITUS WITHOUT COMPLICATION, WITHOUT LONG-TERM CURRENT USE OF INSULIN: ICD-10-CM

## 2018-12-04 LAB
CREAT SERPL-MCNC: 0.7 MG/DL
EST. GFR  (AFRICAN AMERICAN): >60 ML/MIN/1.73 M^2
EST. GFR  (NON AFRICAN AMERICAN): >60 ML/MIN/1.73 M^2

## 2018-12-04 PROCEDURE — 82565 ASSAY OF CREATININE: CPT

## 2018-12-04 PROCEDURE — 12000002 HC ACUTE/MED SURGE SEMI-PRIVATE ROOM

## 2018-12-04 PROCEDURE — 25500020 PHARM REV CODE 255: Performed by: EMERGENCY MEDICINE

## 2018-12-04 PROCEDURE — 96365 THER/PROPH/DIAG IV INF INIT: CPT

## 2018-12-04 PROCEDURE — 82962 GLUCOSE BLOOD TEST: CPT

## 2018-12-04 PROCEDURE — 63600175 PHARM REV CODE 636 W HCPCS: Performed by: EMERGENCY MEDICINE

## 2018-12-04 PROCEDURE — 96372 THER/PROPH/DIAG INJ SC/IM: CPT | Mod: 59

## 2018-12-04 PROCEDURE — A9585 GADOBUTROL INJECTION: HCPCS | Performed by: EMERGENCY MEDICINE

## 2018-12-04 PROCEDURE — 99285 EMERGENCY DEPT VISIT HI MDM: CPT | Mod: ,,, | Performed by: EMERGENCY MEDICINE

## 2018-12-04 PROCEDURE — 96375 TX/PRO/DX INJ NEW DRUG ADDON: CPT

## 2018-12-04 PROCEDURE — 96366 THER/PROPH/DIAG IV INF ADDON: CPT

## 2018-12-04 PROCEDURE — 99285 EMERGENCY DEPT VISIT HI MDM: CPT | Mod: 25

## 2018-12-04 RX ORDER — GLIMEPIRIDE 2 MG/1
2 TABLET ORAL
COMMUNITY
End: 2019-10-09 | Stop reason: SDUPTHER

## 2018-12-04 RX ORDER — SODIUM CHLORIDE 9 MG/ML
INJECTION, SOLUTION INTRAVENOUS CONTINUOUS
Status: DISCONTINUED | OUTPATIENT
Start: 2018-12-05 | End: 2018-12-05

## 2018-12-04 RX ORDER — ACETAMINOPHEN 325 MG/1
650 TABLET ORAL EVERY 6 HOURS PRN
Status: DISCONTINUED | OUTPATIENT
Start: 2018-12-05 | End: 2018-12-10 | Stop reason: HOSPADM

## 2018-12-04 RX ORDER — GADOBUTROL 604.72 MG/ML
10 INJECTION INTRAVENOUS
Status: COMPLETED | OUTPATIENT
Start: 2018-12-04 | End: 2018-12-04

## 2018-12-04 RX ORDER — CARVEDILOL 6.25 MG/1
6.25 TABLET ORAL 2 TIMES DAILY WITH MEALS
Status: DISCONTINUED | OUTPATIENT
Start: 2018-12-05 | End: 2018-12-10 | Stop reason: HOSPADM

## 2018-12-04 RX ORDER — IBUPROFEN 200 MG
24 TABLET ORAL
Status: DISCONTINUED | OUTPATIENT
Start: 2018-12-05 | End: 2018-12-10 | Stop reason: HOSPADM

## 2018-12-04 RX ORDER — ASPIRIN 81 MG/1
81 TABLET ORAL DAILY
Status: DISCONTINUED | OUTPATIENT
Start: 2018-12-05 | End: 2018-12-10 | Stop reason: HOSPADM

## 2018-12-04 RX ORDER — LISINOPRIL 5 MG/1
5 TABLET ORAL DAILY
COMMUNITY
End: 2019-05-07

## 2018-12-04 RX ORDER — OMEPRAZOLE 40 MG/1
40 CAPSULE, DELAYED RELEASE ORAL DAILY
COMMUNITY
End: 2019-08-02 | Stop reason: ALTCHOICE

## 2018-12-04 RX ORDER — LACOSAMIDE 50 MG/1
150 TABLET ORAL DAILY
Status: DISCONTINUED | OUTPATIENT
Start: 2018-12-05 | End: 2018-12-10 | Stop reason: HOSPADM

## 2018-12-04 RX ORDER — ACETAMINOPHEN 10 MG/ML
1000 INJECTION, SOLUTION INTRAVENOUS
Status: COMPLETED | OUTPATIENT
Start: 2018-12-04 | End: 2018-12-04

## 2018-12-04 RX ORDER — GLUCAGON 1 MG
1 KIT INJECTION
Status: DISCONTINUED | OUTPATIENT
Start: 2018-12-05 | End: 2018-12-10 | Stop reason: HOSPADM

## 2018-12-04 RX ORDER — SODIUM CHLORIDE 0.9 % (FLUSH) 0.9 %
3 SYRINGE (ML) INJECTION EVERY 8 HOURS
Status: DISCONTINUED | OUTPATIENT
Start: 2018-12-05 | End: 2018-12-10 | Stop reason: HOSPADM

## 2018-12-04 RX ORDER — IBUPROFEN 200 MG
16 TABLET ORAL
Status: DISCONTINUED | OUTPATIENT
Start: 2018-12-05 | End: 2018-12-10 | Stop reason: HOSPADM

## 2018-12-04 RX ORDER — POLYETHYLENE GLYCOL 3350 17 G/17G
17 POWDER, FOR SOLUTION ORAL DAILY PRN
Status: DISCONTINUED | OUTPATIENT
Start: 2018-12-05 | End: 2018-12-10 | Stop reason: HOSPADM

## 2018-12-04 RX ORDER — INSULIN ASPART 100 [IU]/ML
1-10 INJECTION, SOLUTION INTRAVENOUS; SUBCUTANEOUS
Status: DISCONTINUED | OUTPATIENT
Start: 2018-12-05 | End: 2018-12-10 | Stop reason: HOSPADM

## 2018-12-04 RX ORDER — PROCHLORPERAZINE EDISYLATE 5 MG/ML
10 INJECTION INTRAMUSCULAR; INTRAVENOUS ONCE
Status: COMPLETED | OUTPATIENT
Start: 2018-12-04 | End: 2018-12-04

## 2018-12-04 RX ORDER — LEVOTHYROXINE SODIUM 88 UG/1
88 TABLET ORAL DAILY
Status: DISCONTINUED | OUTPATIENT
Start: 2018-12-05 | End: 2018-12-07

## 2018-12-04 RX ORDER — AMITRIPTYLINE HYDROCHLORIDE 25 MG/1
25 TABLET, FILM COATED ORAL NIGHTLY PRN
Status: DISCONTINUED | OUTPATIENT
Start: 2018-12-05 | End: 2018-12-10 | Stop reason: HOSPADM

## 2018-12-04 RX ORDER — ATORVASTATIN CALCIUM 20 MG/1
40 TABLET, FILM COATED ORAL DAILY
Status: DISCONTINUED | OUTPATIENT
Start: 2018-12-05 | End: 2018-12-10 | Stop reason: HOSPADM

## 2018-12-04 RX ORDER — GABAPENTIN 100 MG/1
100 CAPSULE ORAL 4 TIMES DAILY
Status: DISCONTINUED | OUTPATIENT
Start: 2018-12-05 | End: 2018-12-07

## 2018-12-04 RX ORDER — LACOSAMIDE 150 MG/1
150 TABLET ORAL DAILY
COMMUNITY
End: 2020-11-17

## 2018-12-04 RX ORDER — PIOGLITAZONEHYDROCHLORIDE 15 MG/1
15 TABLET ORAL DAILY
COMMUNITY
End: 2019-10-09 | Stop reason: SDUPTHER

## 2018-12-04 RX ORDER — ONDANSETRON 8 MG/1
8 TABLET, ORALLY DISINTEGRATING ORAL EVERY 8 HOURS PRN
Status: DISCONTINUED | OUTPATIENT
Start: 2018-12-05 | End: 2018-12-10 | Stop reason: HOSPADM

## 2018-12-04 RX ORDER — HEPARIN SODIUM 5000 [USP'U]/ML
5000 INJECTION, SOLUTION INTRAVENOUS; SUBCUTANEOUS EVERY 8 HOURS
Status: DISCONTINUED | OUTPATIENT
Start: 2018-12-05 | End: 2018-12-10 | Stop reason: HOSPADM

## 2018-12-04 RX ORDER — LISINOPRIL 5 MG/1
5 TABLET ORAL DAILY
Status: DISCONTINUED | OUTPATIENT
Start: 2018-12-05 | End: 2018-12-10 | Stop reason: HOSPADM

## 2018-12-04 RX ADMIN — ACETAMINOPHEN 1000 MG: 10 INJECTION, SOLUTION INTRAVENOUS at 09:12

## 2018-12-04 RX ADMIN — IOHEXOL 100 ML: 350 INJECTION, SOLUTION INTRAVENOUS at 10:12

## 2018-12-04 RX ADMIN — PROCHLORPERAZINE EDISYLATE 10 MG: 5 INJECTION INTRAMUSCULAR; INTRAVENOUS at 09:12

## 2018-12-04 RX ADMIN — GADOBUTROL 10 ML: 604.72 INJECTION INTRAVENOUS at 09:12

## 2018-12-05 LAB
ALBUMIN SERPL BCP-MCNC: 3.5 G/DL
ALP SERPL-CCNC: 115 U/L
ALT SERPL W/O P-5'-P-CCNC: 20 U/L
ANION GAP SERPL CALC-SCNC: 9 MMOL/L
APTT BLDCRRT: 22.9 SEC
ASCENDING AORTA: 3.79 CM
AST SERPL-CCNC: 27 U/L
AV INDEX (PROSTH): 1.03
AV MEAN GRADIENT: 8.43 MMHG
AV PEAK GRADIENT: 17.47 MMHG
AV VALVE AREA: 3.22 CM2
BASOPHILS # BLD AUTO: 0.05 K/UL
BASOPHILS NFR BLD: 0.6 %
BILIRUB SERPL-MCNC: 0.8 MG/DL
BUN SERPL-MCNC: 13 MG/DL
CALCIUM SERPL-MCNC: 9.5 MG/DL
CHLORIDE SERPL-SCNC: 104 MMOL/L
CHOLEST SERPL-MCNC: 178 MG/DL
CHOLEST/HDLC SERPL: 3.2 {RATIO}
CK MB SERPL-MCNC: 2.7 NG/ML
CK MB SERPL-RTO: 1.9 %
CK SERPL-CCNC: 145 U/L
CO2 SERPL-SCNC: 27 MMOL/L
CREAT SERPL-MCNC: 0.8 MG/DL
CV ECHO LV RWT: 0.55 CM
DIFFERENTIAL METHOD: ABNORMAL
DOP CALC AO PEAK VEL: 2.09 M/S
DOP CALC AO VTI: 29.27 CM
DOP CALC LVOT AREA: 3.11 CM2
DOP CALC LVOT DIAMETER: 1.99 CM
DOP CALC LVOT STROKE VOLUME: 94.16 CM3
DOP CALCLVOT PEAK VEL VTI: 30.29 CM
E WAVE DECELERATION TIME: 176.11 MSEC
E/A RATIO: 0.59
E/E' RATIO: 16.22
ECHO LV POSTERIOR WALL: 0.93 CM (ref 0.6–1.1)
EOSINOPHIL # BLD AUTO: 0.2 K/UL
EOSINOPHIL NFR BLD: 2.9 %
ERYTHROCYTE [DISTWIDTH] IN BLOOD BY AUTOMATED COUNT: 13.2 %
EST. GFR  (AFRICAN AMERICAN): >60 ML/MIN/1.73 M^2
EST. GFR  (NON AFRICAN AMERICAN): >60 ML/MIN/1.73 M^2
ESTIMATED AVG GLUCOSE: 123 MG/DL
FRACTIONAL SHORTENING: 29 % (ref 28–44)
GLUCOSE SERPL-MCNC: 118 MG/DL
HBA1C MFR BLD HPLC: 5.9 %
HCT VFR BLD AUTO: 43.3 %
HDLC SERPL-MCNC: 56 MG/DL
HDLC SERPL: 31.5 %
HGB BLD-MCNC: 13.8 G/DL
IMM GRANULOCYTES # BLD AUTO: 0.02 K/UL
IMM GRANULOCYTES NFR BLD AUTO: 0.2 %
INR PPP: 0.9
INTERVENTRICULAR SEPTUM: 1.01 CM (ref 0.6–1.1)
IVRT: 0.17 MSEC
LA MAJOR: 4.75 CM
LA MINOR: 4.65 CM
LA WIDTH: 3.22 CM
LDLC SERPL CALC-MCNC: 106 MG/DL
LEFT ATRIUM SIZE: 3.05 CM
LEFT ATRIUM VOLUME INDEX: 20.5 ML/M2
LEFT ATRIUM VOLUME: 39.23 CM3
LEFT INTERNAL DIMENSION IN SYSTOLE: 2.39 CM (ref 2.1–4)
LEFT VENTRICLE DIASTOLIC VOLUME INDEX: 24.64 ML/M2
LEFT VENTRICLE DIASTOLIC VOLUME: 47.14 ML
LEFT VENTRICLE MASS INDEX: 49.2 G/M2
LEFT VENTRICLE SYSTOLIC VOLUME INDEX: 10.4 ML/M2
LEFT VENTRICLE SYSTOLIC VOLUME: 19.95 ML
LEFT VENTRICULAR INTERNAL DIMENSION IN DIASTOLE: 3.39 CM (ref 3.5–6)
LEFT VENTRICULAR MASS: 94.17 G
LV LATERAL E/E' RATIO: 18.25
LV SEPTAL E/E' RATIO: 14.6
LYMPHOCYTES # BLD AUTO: 1.4 K/UL
LYMPHOCYTES NFR BLD: 17.7 %
MAGNESIUM SERPL-MCNC: 2.3 MG/DL
MCH RBC QN AUTO: 31.5 PG
MCHC RBC AUTO-ENTMCNC: 31.9 G/DL
MCV RBC AUTO: 99 FL
MONOCYTES # BLD AUTO: 0.7 K/UL
MONOCYTES NFR BLD: 8.8 %
MV PEAK A VEL: 1.24 M/S
MV PEAK E VEL: 0.73 M/S
NEUTROPHILS # BLD AUTO: 5.6 K/UL
NEUTROPHILS NFR BLD: 69.8 %
NONHDLC SERPL-MCNC: 122 MG/DL
NRBC BLD-RTO: 0 /100 WBC
PHOSPHATE SERPL-MCNC: 4.6 MG/DL
PISA TR MAX VEL: 2.09 M/S
PLATELET # BLD AUTO: 196 K/UL
PMV BLD AUTO: 11.3 FL
POCT GLUCOSE: 129 MG/DL (ref 70–110)
POCT GLUCOSE: 131 MG/DL (ref 70–110)
POCT GLUCOSE: 155 MG/DL (ref 70–110)
POCT GLUCOSE: 92 MG/DL (ref 70–110)
POTASSIUM SERPL-SCNC: 4 MMOL/L
PROT SERPL-MCNC: 7 G/DL
PROTHROMBIN TIME: 10 SEC
PULM VEIN S/D RATIO: 1.46
PV PEAK D VEL: 0.28 M/S
PV PEAK S VEL: 0.41 M/S
PV PEAK VELOCITY: 1.01 CM/S
RA MAJOR: 3.62 CM
RA PRESSURE: 3 MMHG
RA WIDTH: 1.82 CM
RBC # BLD AUTO: 4.38 M/UL
RIGHT VENTRICULAR END-DIASTOLIC DIMENSION: 2.61 CM
SINUS: 3.01 CM
SODIUM SERPL-SCNC: 140 MMOL/L
STJ: 2.7 CM
T4 FREE SERPL-MCNC: 1.02 NG/DL
TDI LATERAL: 0.04
TDI SEPTAL: 0.05
TDI: 0.05
TR MAX PG: 17.47 MMHG
TRICUSPID ANNULAR PLANE SYSTOLIC EXCURSION: 1.99 CM
TRIGL SERPL-MCNC: 80 MG/DL
TROPONIN I SERPL DL<=0.01 NG/ML-MCNC: <0.006 NG/ML
TSH SERPL DL<=0.005 MIU/L-ACNC: 5.22 UIU/ML
TV REST PULMONARY ARTERY PRESSURE: 20.47 MMHG
WBC # BLD AUTO: 8.04 K/UL

## 2018-12-05 PROCEDURE — G9175 SPEECH LANG GOAL STATUS: HCPCS | Mod: CI

## 2018-12-05 PROCEDURE — 85730 THROMBOPLASTIN TIME PARTIAL: CPT

## 2018-12-05 PROCEDURE — 92523 SPEECH SOUND LANG COMPREHEN: CPT

## 2018-12-05 PROCEDURE — A4216 STERILE WATER/SALINE, 10 ML: HCPCS | Performed by: NURSE PRACTITIONER

## 2018-12-05 PROCEDURE — 83036 HEMOGLOBIN GLYCOSYLATED A1C: CPT

## 2018-12-05 PROCEDURE — 97110 THERAPEUTIC EXERCISES: CPT

## 2018-12-05 PROCEDURE — 92610 EVALUATE SWALLOWING FUNCTION: CPT

## 2018-12-05 PROCEDURE — 97161 PT EVAL LOW COMPLEX 20 MIN: CPT

## 2018-12-05 PROCEDURE — 84439 ASSAY OF FREE THYROXINE: CPT

## 2018-12-05 PROCEDURE — 97162 PT EVAL MOD COMPLEX 30 MIN: CPT

## 2018-12-05 PROCEDURE — 85610 PROTHROMBIN TIME: CPT

## 2018-12-05 PROCEDURE — G8996 SWALLOW CURRENT STATUS: HCPCS | Mod: CI

## 2018-12-05 PROCEDURE — G9169 MEMORY GOAL STATUS: HCPCS | Mod: CI

## 2018-12-05 PROCEDURE — 85025 COMPLETE CBC W/AUTO DIFF WBC: CPT

## 2018-12-05 PROCEDURE — 82553 CREATINE MB FRACTION: CPT

## 2018-12-05 PROCEDURE — 95951 HC EEG MONITORING/VIDEO RECORD: CPT

## 2018-12-05 PROCEDURE — 99223 1ST HOSP IP/OBS HIGH 75: CPT | Mod: ,,, | Performed by: NURSE PRACTITIONER

## 2018-12-05 PROCEDURE — G8979 MOBILITY GOAL STATUS: HCPCS | Mod: CJ

## 2018-12-05 PROCEDURE — 63600175 PHARM REV CODE 636 W HCPCS: Performed by: NURSE PRACTITIONER

## 2018-12-05 PROCEDURE — 63600175 PHARM REV CODE 636 W HCPCS: Performed by: PSYCHIATRY & NEUROLOGY

## 2018-12-05 PROCEDURE — G9168 MEMORY CURRENT STATUS: HCPCS | Mod: CI

## 2018-12-05 PROCEDURE — 84100 ASSAY OF PHOSPHORUS: CPT

## 2018-12-05 PROCEDURE — 80053 COMPREHEN METABOLIC PANEL: CPT

## 2018-12-05 PROCEDURE — 84443 ASSAY THYROID STIM HORMONE: CPT

## 2018-12-05 PROCEDURE — 82550 ASSAY OF CK (CPK): CPT

## 2018-12-05 PROCEDURE — 84484 ASSAY OF TROPONIN QUANT: CPT

## 2018-12-05 PROCEDURE — G8997 SWALLOW GOAL STATUS: HCPCS | Mod: CI

## 2018-12-05 PROCEDURE — 83735 ASSAY OF MAGNESIUM: CPT

## 2018-12-05 PROCEDURE — G9174 SPEECH LANG CURRENT STATUS: HCPCS | Mod: CI

## 2018-12-05 PROCEDURE — 80061 LIPID PANEL: CPT

## 2018-12-05 PROCEDURE — G8978 MOBILITY CURRENT STATUS: HCPCS | Mod: CK

## 2018-12-05 PROCEDURE — 25000003 PHARM REV CODE 250: Performed by: NURSE PRACTITIONER

## 2018-12-05 PROCEDURE — 20600001 HC STEP DOWN PRIVATE ROOM

## 2018-12-05 PROCEDURE — 95951 PR EEG MONITORING/VIDEORECORD: CPT | Mod: 26,52,, | Performed by: PSYCHIATRY & NEUROLOGY

## 2018-12-05 PROCEDURE — 99222 1ST HOSP IP/OBS MODERATE 55: CPT | Mod: ,,, | Performed by: PHYSICIAN ASSISTANT

## 2018-12-05 RX ADMIN — HUMAN ALBUMIN MICROSPHERES AND PERFLUTREN 0.66 MG: 10; .22 INJECTION, SOLUTION INTRAVENOUS at 10:12

## 2018-12-05 RX ADMIN — LACOSAMIDE 150 MG: 50 TABLET, FILM COATED ORAL at 12:12

## 2018-12-05 RX ADMIN — HEPARIN SODIUM 5000 UNITS: 5000 INJECTION, SOLUTION INTRAVENOUS; SUBCUTANEOUS at 03:12

## 2018-12-05 RX ADMIN — LEVOTHYROXINE SODIUM 88 MCG: 88 TABLET ORAL at 12:12

## 2018-12-05 RX ADMIN — MULTIPLE VITAMINS W/ MINERALS TAB 1 TABLET: TAB at 12:12

## 2018-12-05 RX ADMIN — Medication 3 ML: at 06:12

## 2018-12-05 RX ADMIN — HEPARIN SODIUM 5000 UNITS: 5000 INJECTION, SOLUTION INTRAVENOUS; SUBCUTANEOUS at 06:12

## 2018-12-05 RX ADMIN — GABAPENTIN 100 MG: 100 CAPSULE ORAL at 12:12

## 2018-12-05 RX ADMIN — CARVEDILOL 6.25 MG: 6.25 TABLET, FILM COATED ORAL at 05:12

## 2018-12-05 RX ADMIN — SODIUM CHLORIDE: 0.9 INJECTION, SOLUTION INTRAVENOUS at 04:12

## 2018-12-05 RX ADMIN — HEPARIN SODIUM 5000 UNITS: 5000 INJECTION, SOLUTION INTRAVENOUS; SUBCUTANEOUS at 09:12

## 2018-12-05 RX ADMIN — SODIUM CHLORIDE: 0.9 INJECTION, SOLUTION INTRAVENOUS at 12:12

## 2018-12-05 RX ADMIN — LISINOPRIL 5 MG: 5 TABLET ORAL at 12:12

## 2018-12-05 RX ADMIN — ASPIRIN 81 MG: 81 TABLET, COATED ORAL at 12:12

## 2018-12-05 RX ADMIN — CARVEDILOL 6.25 MG: 6.25 TABLET, FILM COATED ORAL at 12:12

## 2018-12-05 RX ADMIN — GABAPENTIN 100 MG: 100 CAPSULE ORAL at 05:12

## 2018-12-05 RX ADMIN — GABAPENTIN 100 MG: 100 CAPSULE ORAL at 09:12

## 2018-12-05 RX ADMIN — ATORVASTATIN CALCIUM 40 MG: 20 TABLET, FILM COATED ORAL at 12:12

## 2018-12-05 RX ADMIN — Medication 3 ML: at 09:12

## 2018-12-05 RX ADMIN — ACETAMINOPHEN 650 MG: 325 TABLET, FILM COATED ORAL at 06:12

## 2018-12-05 NOTE — PLAN OF CARE
Problem: Patient Care Overview  Goal: Plan of Care Review  Recommendations     Recommendation/Intervention: 1. Provide Diabetic 2000kcal diet.  Goals: 1. Pt to consume >75% meals.    Assessment and Plan     Nutrition Problem  Inadequate oral intake     Related to (etiology):   Pt was NPO x 24 hrs     Signs and Symptoms (as evidenced by):   Pt with no meals since yesterday

## 2018-12-05 NOTE — HPI
67 y/o female with prior stroke who had hx of dizziness but last night it started to get worse and has nausea and vomiting. This morning it seemed to have gotten worse so she was taken to [facility] and CT scan showed possible brain mass so call was placed to neurosurgery and patient was transferred for neurosurgery evaluation. Upon arrival MRI Brain w w/o contrast done and acute infarct  in  Left cerebellum seen.  Patient still with nausea and dizziness. Patient has also been have episodes of falling and not knowing why, she has seen neurologist to get checked out for seizure activity and was placed on Vimpat.     Risk factors HTN, DM, prior stroke  Patient has had a loop recorder installed s/p PFO closure.  Loop recorder reported no afib after stroke , only tachycardia.

## 2018-12-05 NOTE — ED PROVIDER NOTES
Encounter Date: 12/4/2018    SCRIBE #1 NOTE: I, Alison Lim, am scribing for, and in the presence of,  Deandre Sadler MD. I have scribed the following portions of the note - Other sections scribed: HPI,ROS,PE.     I, Dr. Alcon Sadler, personally performed the services described in this documentation. All medical record entries made by the scribe were at my direction and in my presence.  I have reviewed the chart and agree that the record reflects my personal performance and is accurate and complete.  Alcon Sadler MD.  12:27 AM 12/05/2018      History     Chief Complaint   Patient presents with    Neuro transfer     Pt transferred from [facility] for neuro consult - pt has known brain lesion to right side, began having new symptoms of headache, left facial droop and vomiting last night. CT showed new lesion to left side. Pt AAOx4.     68 y.o. Female with PMHx of Thyroid disease, HTN, DM, presents to the ED for neurology transfer. Patient was transferred from [facility]. Patient has know brain lesion to right side and began to have new symptoms of left sided headache, left facial droop and emesis last night. Patient had CT done which showed new lesions to her left side. Denies any associated symptoms at time of evaluation. Patient was accepted by neuro for new imaging.       The history is provided by the patient and the EMS personnel.     Review of patient's allergies indicates:   Allergen Reactions    Codeine Itching    Morphine Other (See Comments)     Hallucinations    Sulfa (sulfonamide antibiotics) Itching     Past Medical History:   Diagnosis Date    Anxiety     Depression     Diabetes type 2, controlled     Headache     Hx of psychiatric care     Hypertension     Neuropathy     Psychiatric problem     Therapy     Thyroid disease      Past Surgical History:   Procedure Laterality Date    anterior cervical spine fusion      back surgery lumbar      CARPAL TUNNEL RELEASE       CHOLECYSTECTOMY      HYSTERECTOMY      left knee arthroplasty      ROTATOR CUFF REPAIR       Family History   Problem Relation Age of Onset    Bipolar disorder Mother      Social History     Tobacco Use    Smoking status: Former Smoker     Start date: 2/17/2005    Smokeless tobacco: Never Used   Substance Use Topics    Alcohol use: No    Drug use: No     Review of Systems   Constitutional: Negative for fever.   HENT: Negative for sore throat.    Respiratory: Negative for shortness of breath.    Cardiovascular: Negative for chest pain.   Gastrointestinal: Negative for nausea.   Genitourinary: Negative for dysuria.   Musculoskeletal: Negative for back pain.   Skin: Negative for rash.   Neurological: Negative for weakness.   Hematological: Does not bruise/bleed easily.       Physical Exam     Initial Vitals [12/04/18 2011]   BP Pulse Resp Temp SpO2   (!) 148/63 82 16 100.1 °F (37.8 °C) 95 %      MAP       --         Physical Exam    Nursing note and vitals reviewed.  Constitutional: She appears well-developed and well-nourished.   HENT:   Head: Normocephalic and atraumatic.   Eyes: Conjunctivae and EOM are normal. Pupils are equal, round, and reactive to light.   Neck: Normal range of motion. Neck supple. No tracheal deviation present.   Cardiovascular: Normal rate, regular rhythm, normal heart sounds and intact distal pulses.   Pulmonary/Chest: Breath sounds normal. No respiratory distress. She has no wheezes. She has no rhonchi. She has no rales. She exhibits no tenderness.   Abdominal: Soft. Bowel sounds are normal. She exhibits no distension. There is no tenderness. There is no rebound and no guarding.   Musculoskeletal: She exhibits no edema or tenderness.   Neurological: She is alert and oriented to person, place, and time. No cranial nerve deficit or sensory deficit.   Neurologically intact at time of evaluation.    Skin: Skin is warm and dry. Capillary refill takes less than 2 seconds.   Psychiatric:  She has a normal mood and affect. Her behavior is normal. Judgment and thought content normal.         ED Course   Procedures  Labs Reviewed   POCT GLUCOSE - Abnormal; Notable for the following components:       Result Value    POCT Glucose 131 (*)     All other components within normal limits   CREATININE, SERUM   LIPID PANEL   HEMOGLOBIN A1C   TSH   URINALYSIS   POCT GLUCOSE MONITORING CONTINUOUS          Imaging Results          CT Chest Abdoment Pelvis With Contrast (Final result)  Result time 12/04/18 22:53:23   Procedure changed from CT Abdomen Pelvis With Contrast     Final result by Jewel Eason MD (12/04/18 22:53:23)                 Narrative:    EXAMINATION:  CT CHEST ABDOMEN PELVIS WITH CONTRAST (XPD)    CLINICAL HISTORY:  possible mets;    TECHNIQUE:  Routine axial CT images of the chest, abdomen were obtained after administration 100cc of IV Omnipaque 350.  .  Coronal and Sagittal reformatted images were also obtained.    COMPARISON:  None.    FINDINGS:  Heart: Normal in Size.  No pericardial effusion.    Lungs: Well aerated, without consolidation or pleural effusion. No hilar or mediastinal mass or lymphadenopathy.  Punctate calcified granuloma right base.    Liver: Normal in size and attenuation, with no focal hepatic lesions.    Gallbladder: Status post cholecystectomy.    Bile ducts: No evidence of dilated ducts.    Pancreas: No mass or peripancreatic fat stranding.    Spleen: Normal.    Adrenals: Normal.    GI Tract/ Mesentery: No evidence of bowel obstruction or inflammation.    Kidneys/ Ureters: Small left kidney with extensive scarring in the upper and lower poles.  Mild scarring lower pole right kidney.  No hydronephrosis or nephrolithiasis. No ureteral dilatation.    Bladder: No evidence of wall thickening.    Reproductive organs: Status post hysterectomy.    Retroperitoneum: No significant adenopathy. Aorta tapers normally with atherosclerotic changes present.    Peritoneal space: No  ascites. No free air.    Abdominal wall: Normal.    Vasculature: Aorta tapers normally with atherosclerotic changes present.    Bones: L1 compression fracture.  This appears sclerotic and there is no surrounding hematoma suggesting likely chronic.  L3 through S1 posterior spinal fusion with rods and screws.  Lumbar degenerative disc changes at L2-3.    IMPRESSION  No metastatic disease identified in the chest, abdomen or pelvis.    Prior cholecystectomy and hysterectomy.    Small left kidney with extensive scarring in the upper and lower poles.  Mild scarring lower pole right kidney.    L1 compression fracture.  This appears sclerotic and there is no surrounding hematoma suggesting likely chronic.  L3 through S1 posterior spinal fusion with rods and screws.  Lumbar degenerative disc changes at L2-3.      Electronically signed by: Jewel Eason MD  Date:    12/04/2018  Time:    22:53                             MRI Brain W WO Contrast (Final result)     Abnormal  Result time 12/04/18 22:59:49    Final result by Jewel Eason MD (12/04/18 22:59:49)                 Impression:      Findings suggestive of acute infarct in the territory of the left superficial cerebellar artery.    Abnormal signal intensity in the right frontal lobe probably represents encephalomalacia as a sequela of prior infarct seen in this region seen on MRI brain 04/18/2016.  There is no enhancement and the moderate surrounding T2/FLAIR signal hyperintensity may represent more confluent white matter disease.  In this patient with reported history of a brain lesion, white matter post radiation changes could appear similar and correlation with history of radiation is recommended.    Additionally there is a nonspecific area of nonenhancing heterogenous signal intensity located in the right posterior occipital lobe and posterior parietal lobe.  While these may represent sequela of prior infarcts, given history of brain mass, suggest correlation  with recent prior outside imaging studies.    Small remote lacunar type infarcts in the posterior cerebellum bilaterally.    This report was flagged in Epic as abnormal.    COMMUNICATION  This critical result was discovered/received at 12/04/2018 at 22:30.  The critical information above was relayed directly by me by telephone to Dr. Sadler on 12/04/2018 at 22:31.    Electronically signed by resident: Moise Gray  Date:    12/04/2018  Time:    22:03    Electronically signed by: Jewel Eason MD  Date:    12/04/2018  Time:    22:59             Narrative:    EXAMINATION:  MRI BRAIN W WO CONTRAST    CLINICAL HISTORY:  brain mass;.  Disorder of brain, unspecified    TECHNIQUE:  Multiplanar multisequence MR imaging of the brain was performed before and after the administration of 10 mL Gadavist  intravenous contrast.    COMPARISON:  MRI brain 04/18/2016.    FINDINGS:  Geographic area T2/FLAIR signal hyperintensity with diffusion restriction and low signal on ADC without enhancement likely corresponds to an acute infarct in the distribution of the left superficial cerebellar artery.  Also there are small remote lacunar-type infarcts posterior cerebellum bilaterally.    Additionally there is a nonspecific area of nonenhancing heterogenous signal intensity located in the right posterior occipital lobe and posterior parietal lobe.    In the right frontal lobe there is a nonenhancing predominantly T1 and T2 FLAIR hypointense area measuring 3.4 x 1.9 cm which demonstrates surrounding increased T2 FLAIR signal in the surrounding white matter without mass effect.  No midline shift. No hemorrhage seen.  No evidence of hydrocephalus.  No extra-axial fluid collections.  Visualized major vascular flow voids are preserved.  Bone marrow signal intensity appears normal.                                 Medical Decision Making:   History:   Old Medical Records: I decided to obtain old medical records.  Clinical Tests:   Lab Tests:  Ordered and Reviewed  Radiological Study: Ordered and Reviewed  ED Management:  Because of the MRI findings vascular Neurology was contacted, and at this point in time is admitting the patient for what they also believe is a recent infarction.  The patient has remained hemodynamically stable and neurologically intact throughout her stay in the ER.            Scribe Attestation:   Scribe #1: I performed the above scribed service and the documentation accurately describes the services I performed. I attest to the accuracy of the note.               Clinical Impression:   The primary encounter diagnosis was Cerebellar stroke, acute. A diagnosis of Brain mass was also pertinent to this visit.                             Deandre Sadler MD  12/05/18 0028

## 2018-12-05 NOTE — H&P
[facility]  Vascular Neurology  Comprehensive Stroke Center  History & Physical    Inpatient consult to Vascular (Stroke) Neurology  Consult performed by: Nicole Marino NP  Consult ordered by: Deandre Sadler MD  Reason for consult: stroke        Assessment/Plan:     Patient is a 68 y.o. year old female with:    * Cerebellar stroke, acute    69 y/o female with left cerebellar stroke    Antithrombotics: aspirin 81 mg daily    Statins: Lipitor 40 mg daily    Aggressive risk factor modification: HTN, DM     Rehab efforts: PT/OT/SLP to evaluate and treat    Diagnostics ordered/pending: HgbA1C to assess blood glucose levels, Lipid Profile to assess cholesterol levels, MRI head without contrast to assess brain parenchyma, TTE to assess cardiac function/status , Other: EEG 24 hour    VTE prophylaxis: Heparin 5000 units SQ every 8 hours, SCD's    BP parameters: Infarct: No intervention, SBP <220         Hypertension, essential    Stroke risk factor  SBP <220     Diabetes type 2, controlled    Stroke risk factor  HgB A1C  SSI     Acquired hypothyroidism    Stroke risk factor  Home levothyroxine  TSH         STROKE DOCUMENTATION          NIH Scale:  1a. Level Of Consciousness: 0-->Alert: keenly responsive  1b. LOC Questions: 0-->Answers both questions correctly  1c. LOC Commands: 0-->Performs both tasks correctly  2. Best Gaze: 0-->Normal  3. Visual: 0-->No visual loss  4. Facial Palsy: 0-->Normal symmetrical movements  5a. Motor Arm, Left: 0-->No drift: limb holds 90 (or 45) degrees for full 10 secs  5b. Motor Arm, Right: 0-->No drift: limb holds 90 (or 45) degrees for full 10 secs  6a. Motor Leg, Left: 0-->No drift: leg holds 30 degree position for full 5 secs  6b. Motor Leg, Right: 0-->No drift: leg holds 30 degree position for full 5 secs  7. Limb Ataxia: 1-->Present in one limb  8. Sensory: 0-->Normal: no sensory loss  9. Best Language: 0-->No aphasia: normal  10. Dysarthria: 0-->Normal  11.  Extinction and Inattention (formerly Neglect): 0-->No abnormality  Total (NIH Stroke Scale): 1     Modified Gove Score: 0  [place] Coma Scale:15   ABCD2 Score:    [id number] Score:   HAS -BLED Score:   ICH Score:   Hunt & Sun Classification:      Thrombolysis Candidate? No, Out of window       Interventional Revascularization Candidate?   Is the patient eligible for mechanical endovascular reperfusion (CORNELIUS)?  out of window    Hemorrhagic change of an Ischemic Stroke: Does this patient have an ischemic stroke with hemorrhagic changes? No         Subjective:     History of Present Illness:  69 y/o female with prior stroke who had hx of dizziness but last night it started to get worse and has nausea and vomiting. This morning it seemed to have gotten worse so she was taken to [facility] and CT scan showed possible brain mass so call was placed to neurosurgery and patient was transferred for neurosurgery evaluation. Upon arrival MRI Brain w w/o contrast done and acute infarct  in  Left cerebellum seen.  Patient still with nausea and dizziness.   She has also been have episodes of falling and not knowing why, she has seen neurologist to get checked out for seizure activity and was placed on Vimpat.   Risk factors HTN, DM, prior stroke  Patient had a 24 hours Holter monitor in past.         Past Medical History:   Diagnosis Date    Anxiety     Depression     Diabetes type 2, controlled     Headache     Hx of psychiatric care     Hypertension     Neuropathy     Psychiatric problem     Therapy     Thyroid disease      Past Surgical History:   Procedure Laterality Date    anterior cervical spine fusion      back surgery lumbar      CARPAL TUNNEL RELEASE      CHOLECYSTECTOMY      HYSTERECTOMY      left knee arthroplasty      ROTATOR CUFF REPAIR       Family History   Problem Relation Age of Onset    Bipolar disorder Mother      Social History     Tobacco Use    Smoking status: Former Smoker      Start date: 2/17/2005    Smokeless tobacco: Never Used   Substance Use Topics    Alcohol use: No    Drug use: No     Review of patient's allergies indicates:   Allergen Reactions    Codeine Itching    Morphine Other (See Comments)     Hallucinations    Sulfa (sulfonamide antibiotics) Itching       Medications: I have reviewed the current medication administration record.      (Not in a hospital admission)    Review of Systems   Constitutional: Negative for chills and fever.   HENT: Negative for ear discharge and ear pain.    Eyes: Negative for pain and redness.   Respiratory: Negative for cough and shortness of breath.    Cardiovascular: Negative for chest pain and leg swelling.   Gastrointestinal: Negative for abdominal distention and abdominal pain.   Endocrine: Negative for cold intolerance.   Genitourinary: Negative for difficulty urinating and dysuria.   Musculoskeletal: Negative for arthralgias and back pain.   Skin: Negative for rash and wound.   Neurological: Positive for dizziness.     Objective:     Vital Signs (Most Recent):  Temp: 100.1 °F (37.8 °C) (12/04/18 2011)  Pulse: 81 (12/05/18 0132)  Resp: 18 (12/05/18 0132)  BP: (!) 153/83 (12/05/18 0132)  SpO2: 96 % (12/05/18 0132)    Vital Signs Range (Last 24H):  Temp:  [99.1 °F (37.3 °C)-100.1 °F (37.8 °C)]   Pulse:  [75-91]   Resp:  [13-22]   BP: (128-168)/(63-83)   SpO2:  [89 %-98 %]     Physical Exam   Constitutional: She is oriented to person, place, and time. She appears well-developed and well-nourished.   HENT:   Head: Normocephalic and atraumatic.   Eyes: EOM are normal. Pupils are equal, round, and reactive to light.   Neck: Normal range of motion.   Cardiovascular: Normal rate.   Pulmonary/Chest: Effort normal.   Abdominal: Soft.   Musculoskeletal: Normal range of motion.   Neurological: She is alert and oriented to person, place, and time.   Skin: Skin is warm and dry.   Nursing note and vitals reviewed.      Neurological Exam:   LOC:  alert  Attention Span: Good   Language: No aphasia  Articulation: No dysarthria  Orientation: Person, Place, Time   Visual Fields: Full  EOM (CN III, IV, VI): Full/intact  Pupils (CN II, III): PERRL  Facial Sensation (CN V): Normal  Facial Movement (CN VII): Symmetric facial expression    Gag Reflex: present  Reflexes: flexor plantar responses bilaterally  Motor: Arm left  Normal 5/5  Leg left  Normal 5/5  Arm right  Normal 5/5  Leg right Normal 5/5  Cebellar: Upper Extremity Appendicular Ataxia (Finger Nose Finger)  Left  Sensation: Intact to light touch, temperature and vibration  Tone: Normal tone throughout      Laboratory:  CMP:   Recent Labs   Lab 12/04/18  2040   CREATININE 0.7     CBC: No results for input(s): WBC, RBC, HGB, HCT, PLT, MCV, MCH, MCHC in the last 168 hours.  Lipid Panel:   Recent Labs   Lab 12/05/18  0010   CHOL 178   LDLCALC 106.0   HDL 56   TRIG 80     Coagulation: No results for input(s): PT, INR, APTT in the last 168 hours.  Hgb A1C:   Recent Labs   Lab 12/05/18  0010   HGBA1C 5.9*     TSH:   Recent Labs   Lab 12/05/18  0010   TSH 5.218*       Diagnostic Results:      Brain imaging:  MRI brain w w/o contrast 12-4-18 results:    Findings suggestive of acute infarct in the territory of the left superficial cerebellar artery.    Abnormal signal intensity in the right frontal lobe probably represents encephalomalacia as a sequela of prior infarct seen in this region seen on MRI brain 04/18/2016.  There is no enhancement and the moderate surrounding T2/FLAIR signal hyperintensity may represent more confluent white matter disease.  In this patient with reported history of a brain lesion, white matter post radiation changes could appear similar and correlation with history of radiation is recommended.    Additionally there is a nonspecific area of nonenhancing heterogenous signal intensity located in the right posterior occipital lobe and posterior parietal lobe.  While these may represent  sequela of prior infarcts, given history of brain mass, suggest correlation with recent prior outside imaging studies.    Small remote lacunar type infarcts in the posterior cerebellum bilaterally.    Vessel Imaging:      Cardiac Evaluation:           Nicole Marino NP  Com[facility] Stroke Center  Department of Vascular Neurology   [facility]

## 2018-12-05 NOTE — SUBJECTIVE & OBJECTIVE
Past Medical History:   Diagnosis Date    Anxiety     Depression     Diabetes type 2, controlled     Headache     Hx of psychiatric care     Hypertension     Neuropathy     Psychiatric problem     Therapy     Thyroid disease      Past Surgical History:   Procedure Laterality Date    anterior cervical spine fusion      back surgery lumbar      CARPAL TUNNEL RELEASE      CHOLECYSTECTOMY      HYSTERECTOMY      left knee arthroplasty      ROTATOR CUFF REPAIR       Family History   Problem Relation Age of Onset    Bipolar disorder Mother      Social History     Tobacco Use    Smoking status: Former Smoker     Start date: 2/17/2005    Smokeless tobacco: Never Used   Substance Use Topics    Alcohol use: No    Drug use: No     Review of patient's allergies indicates:   Allergen Reactions    Codeine Itching    Morphine Other (See Comments)     Hallucinations    Sulfa (sulfonamide antibiotics) Itching       Medications: I have reviewed the current medication administration record.      (Not in a hospital admission)    Review of Systems   Constitutional: Negative for chills and fever.   HENT: Negative for ear discharge and ear pain.    Eyes: Negative for pain and redness.   Respiratory: Negative for cough and shortness of breath.    Cardiovascular: Negative for chest pain and leg swelling.   Gastrointestinal: Negative for abdominal distention and abdominal pain.   Endocrine: Negative for cold intolerance.   Genitourinary: Negative for difficulty urinating and dysuria.   Musculoskeletal: Negative for arthralgias and back pain.   Skin: Negative for rash and wound.   Neurological: Positive for dizziness.     Objective:     Vital Signs (Most Recent):  Temp: 100.1 °F (37.8 °C) (12/04/18 2011)  Pulse: 81 (12/05/18 0132)  Resp: 18 (12/05/18 0132)  BP: (!) 153/83 (12/05/18 0132)  SpO2: 96 % (12/05/18 0132)    Vital Signs Range (Last 24H):  Temp:  [99.1 °F (37.3 °C)-100.1 °F (37.8 °C)]   Pulse:  [75-91]    Resp:  [13-22]   BP: (128-168)/(63-83)   SpO2:  [89 %-98 %]     Physical Exam   Constitutional: She is oriented to person, place, and time. She appears well-developed and well-nourished.   HENT:   Head: Normocephalic and atraumatic.   Eyes: EOM are normal. Pupils are equal, round, and reactive to light.   Neck: Normal range of motion.   Cardiovascular: Normal rate.   Pulmonary/Chest: Effort normal.   Abdominal: Soft.   Musculoskeletal: Normal range of motion.   Neurological: She is alert and oriented to person, place, and time.   Skin: Skin is warm and dry.   Nursing note and vitals reviewed.      Neurological Exam:   LOC: alert  Attention Span: Good   Language: No aphasia  Articulation: No dysarthria  Orientation: Person, Place, Time   Visual Fields: Full  EOM (CN III, IV, VI): Full/intact  Pupils (CN II, III): PERRL  Facial Sensation (CN V): Normal  Facial Movement (CN VII): Symmetric facial expression    Gag Reflex: present  Reflexes: flexor plantar responses bilaterally  Motor: Arm left  Normal 5/5  Leg left  Normal 5/5  Arm right  Normal 5/5  Leg right Normal 5/5  Cebellar: Upper Extremity Appendicular Ataxia (Finger Nose Finger)  Left  Sensation: Intact to light touch, temperature and vibration  Tone: Normal tone throughout      Laboratory:  CMP:   Recent Labs   Lab 12/04/18  2040   CREATININE 0.7     CBC: No results for input(s): WBC, RBC, HGB, HCT, PLT, MCV, MCH, MCHC in the last 168 hours.  Lipid Panel:   Recent Labs   Lab 12/05/18  0010   CHOL 178   LDLCALC 106.0   HDL 56   TRIG 80     Coagulation: No results for input(s): PT, INR, APTT in the last 168 hours.  Hgb A1C:   Recent Labs   Lab 12/05/18  0010   HGBA1C 5.9*     TSH:   Recent Labs   Lab 12/05/18  0010   TSH 5.218*       Diagnostic Results:      Brain imaging:  MRI brain w w/o contrast 12-4-18 results:    Findings suggestive of acute infarct in the territory of the left superficial cerebellar artery.    Abnormal signal intensity in the right  frontal lobe probably represents encephalomalacia as a sequela of prior infarct seen in this region seen on MRI brain 04/18/2016.  There is no enhancement and the moderate surrounding T2/FLAIR signal hyperintensity may represent more confluent white matter disease.  In this patient with reported history of a brain lesion, white matter post radiation changes could appear similar and correlation with history of radiation is recommended.    Additionally there is a nonspecific area of nonenhancing heterogenous signal intensity located in the right posterior occipital lobe and posterior parietal lobe.  While these may represent sequela of prior infarcts, given history of brain mass, suggest correlation with recent prior outside imaging studies.    Small remote lacunar type infarcts in the posterior cerebellum bilaterally.    Vessel Imaging:      Cardiac Evaluation:

## 2018-12-05 NOTE — ED TRIAGE NOTES
"Pt reports headache last PM at approx midnight.  States n/v "several times."  Sent from [facility] via EMS for neuroconsult due to abnormal CT.  On arrival, pt c/o generalized headache and nausea  "

## 2018-12-05 NOTE — PT/OT/SLP EVAL
Physical Therapy Evaluation    Patient Name:  Missy Ennis   MRN:  [id number]    Recommendations:     Discharge Recommendations:  rehabilitation facility   Discharge Equipment Recommendations: (TBD)   Barriers to discharge: None    Assessment:     Missy Ennis is a 68 y.o. female admitted with a medical diagnosis of Cerebellar stroke, acute.  She presents with the following impairments/functional limitations:  weakness, impaired endurance, gait instability, impaired functional mobilty, impaired self care skills, impaired balance.  Ms. Ennis was utilizing a single point cane prior to admission, was able to dress/bathe herself independently, and experienced a fall this past May 2018. At this time, she presents with impaired seated balance (CGA with posterior lean) and gait instability (Minimal assistance with UE support via hand-held assistance).  She will benefit from continued PT services here as well as at an IP rehab facility to improve her overall functional mobility in order to decrease her overall fall risk and maximize her return to functional independence.    Rehab Prognosis:  good; patient would benefit from acute skilled PT services to address these deficits and reach maximum level of function.      Recent Surgery: * No surgery found *      Plan:     During this hospitalization, patient to be seen 5 x/week to address the above listed problems via gait training, therapeutic exercises, therapeutic activities, neuromuscular re-education  · Plan of Care Expires:  01/04/19   Plan of Care Reviewed with: patient, daughter    Subjective     Communicated with nurse prior to session.  Patient found supine in bed, EEG running with daughter present upon PT entry to room, agreeable to evaluation.      Chief Complaint: None  Patient comments/goals: Return home  Pain/Comfort:  · Pain Rating 1: 0/10    Patients cultural, spiritual, Advent conflicts given the current situation: N/A    Living  Environment:  Lives with her  (retired), and pt is retired as well.  Does not like to drive due to decreased cervical AROM and increased confusion.  Pt lives in a 1 SH, 3-4 steps to enter with ramp access. Was using a SPC prior to admission due to neuropathy and multiple back surgeries (one in 2013, 2015; stroke in 2016).   Prior to admission, patients level of function was independent with equipment for ADLs/gait (ADLs-- shower chair, gait-- SPC).  Patient has the following equipment: shower chair, cane, straight.  DME owned (not currently used): none.  Upon discharge, patient will have assistance from .    Objective:     Patient found with:       General Precautions: Standard, aspiration, fall   Orthopedic Precautions:N/A   Braces: N/A     Exams:  · Cognitive Exam:  Patient is oriented to Person, Place, Time and Situation- slight delay noted with questions and commands.  · Fine Motor Coordination: -       Intact  Left hand thumb/finger opposition skills, Right hand thumb/finger opposition skills, Left hand, diadochokinesis skill , Right hand, diadochokinesis skill , RLE heel shin and LLE heel shin  · Postural Exam:  Patient presented with the following abnormalities: -       Posterior pelvic tilt  · -       slight posterior lean  · Sensation: -       Intact  · RUE ROM: WNL  · RUE Strength: WNL  · LUE ROM: WNL  · LUE Strength: WNL  · RLE ROM: WNL  · RLE Strength: WNL  · LLE ROM: WNL  · LLE Strength: WNL    Functional Mobility:  · Bed Mobility:  Supine to Sit: moderate assistance-- pull to sit up  · Sit to Supine: stand by assistance  · Transfers:  Sit to Stand:  minimum assistance with no AD from edge of bed  · Gait: Side-steps along edge of bed (4 steps x 6 trials total to each side) with Min A and hand-held assistance  · Balance: Needs CGA-Sg for standing balance; seated balance requires CGA due to slight posterior lean.   · Able to correct both seated and standing posterior lean with  verbal/manual cueing and facilitation at abdominal musculature. Pt is able to stand without UE support statically with close SBA.    AM-PAC 6 CLICK MOBILITY  Total Score:18       Therapeutic Activities and Exercises:  · Completed the following seated exercises: hip flexion, long arc quads, ankle pumps with knee extension x 10 reps BLE with CGA for trunk stability.  · Explained PT plan of care to pt and pt's daughter.  · Pt's presentation is evolving due to presence of brain mass and its effects on her functional mobility as well as cognition. Daughter answered a few questions for her due to her delay.      Patient left HOB elevated with call button in reach.    GOALS:   Multidisciplinary Problems     Physical Therapy Goals        Problem: Physical Therapy Goal    Goal Priority Disciplines Outcome Goal Variances Interventions   Physical Therapy Goal     PT, PT/OT Ongoing (interventions implemented as appropriate)     Description:  Goals to be met by: 1 week ()     Patient will increase functional independence with mobility by performin. Supine to sit with supervision. Not met  2. Sit to supine with supervision. Not met  3. Sit to stand transfer with Stand-by Assistance. Not met  4. Bed to chair transfer with Contact Guard Assistance using Rolling Walker. Not met  5. Gait  x  75 feet with Contact guard assistance using Rolling Walker.  Not met  6. Ascend/descend 3 stairs with bilateral Handrails Contact Guard Assistance. Not met  7. Stand for 3 minutes with Contact Guard Assistance using Rolling Walker while performing a task. Not met  8. Lower extremity exercise program x 20 reps per handout, with independence. Not met                      History:     Past Medical History:   Diagnosis Date    Anxiety     Depression     Diabetes type 2, controlled     Headache     Hx of psychiatric care     Hypertension     Neuropathy     Psychiatric problem     Therapy     Thyroid disease        Past Surgical  History:   Procedure Laterality Date    anterior cervical spine fusion      back surgery lumbar      CARPAL TUNNEL RELEASE      CHOLECYSTECTOMY      HYSTERECTOMY      left knee arthroplasty      ROTATOR CUFF REPAIR         Clinical Decision Making:     History  Co-morbidities and personal factors that may impact the plan of care Examination  Body Structures and Functions, activity limitations and participation restrictions that may impact the plan of care Clinical Presentation   Decision Making/ Complexity Score   Co-morbidities:   [] Time since onset of injury / illness / exacerbation  [] Status of current condition  []Patient's cognitive status and safety concerns    [x] Multiple Medical Problems (see med hx)  Personal Factors:   [x] Patient's age  [x] Prior Level of function   [] Patient's home situation (environment and family support)  [] Patient's level of motivation  [] Expected progression of patient      HISTORY:(criteria)    [] 03487 - no personal factors/history    [] 81278 - has 1-2 personal factor/comorbidity     [x] 55236 - has >3 personal factor/comorbidity     Body Regions:  [] Objective examination findings  [] Head     []  Neck  [] Trunk   [] Upper Extremity  [] Lower Extremity    Body Systems:  [] For communication ability, affect, cognition, language, and learning style: the assessment of the ability to make needs known, consciousness, orientation (person, place, and time), expected emotional /behavioral responses, and learning preferences (eg, learning barriers, education  needs)  [x] For the neuromuscular system: a general assessment of gross coordinated movement (eg, balance, gait, locomotion, transfers, and transitions) and motor function  (motor control and motor learning)  [x] For the musculoskeletal system: the assessment of gross symmetry, gross range of motion, gross strength, height, and weight  [] For the integumentary system: the assessment of pliability(texture), presence of  scar formation, skin color, and skin integrity  [] For cardiovascular/pulmonary system: the assessment of heart rate, respiratory rate, blood pressure, and edema     Activity limitations:    [x] Patient's cognitive status and saf ety concerns          [x] Status of current condition      [] Weight bearing restriction  [] Cardiopulmunary Restriction    Participation Restrictions:   [] Goals and goal agreement with the patient     [] Rehab potential (prognosis) and probable outcome      Examination of Body System: (criteria)    [] 36636 - addressing 1-2 elements    [x] 71670 - addressing a total of 3 or more elements     [] 75096 -  Addressing a total of 4 or more elements         Clinical Presentation: (criteria)  Evolving - 31091     On examination of body system using standardized tests and measures patient presents with 3 or more elements from any of the following: body structures and functions, activity limitations, and/or participation restrictions.  Leading to a clinical presentation that is considered evolving with changing characteristics                              Clinical Decision Making  (Eval Complexity):  Moderate - 13934     Time Tracking:     PT Received On: 12/05/18  PT Start Time: 1530     PT Stop Time: 1600  PT Total Time (min): 30 min     Billable Minutes: Evaluation 15 and Therapeutic Exercise 15      Natacha Moser, PT  12/05/2018

## 2018-12-05 NOTE — PLAN OF CARE
Problem: SLP Goal  Goal: SLP Goal  Outcome: Ongoing (interventions implemented as appropriate)  Regular diet with thin liquids recommended.  No straws.    Arabella Barlow MA/[facility]-SLP  Speech Language Pathologist  Pager [phone]  12/5/2018

## 2018-12-05 NOTE — ASSESSMENT & PLAN NOTE
69 y/o female with left cerebellar stroke    Antithrombotics: aspirin 81 mg daily    Statins: Lipitor 40 mg daily    Aggressive risk factor modification: HTN, DM     Rehab efforts: PT/OT/SLP to evaluate and treat    Diagnostics ordered/pending: HgbA1C to assess blood glucose levels, Lipid Profile to assess cholesterol levels, MRI head without contrast to assess brain parenchyma, TTE to assess cardiac function/status , Other: EEG 24 hour    VTE prophylaxis: Heparin 5000 units SQ every 8 hours, SCD's    BP parameters: Infarct: No intervention, SBP <220

## 2018-12-05 NOTE — PT/OT/SLP EVAL
"Speech Language Pathology Evaluation  Cognitive/Bedside Swallow    Patient Name:  Missy Ennis   MRN:  [id number]  Admitting Diagnosis: Cerebellar stroke, acute    Recommendations:                  General Recommendations:  Dysphagia therapy and Cognitive-linguistic therapy  Diet recommendations:  Regular, Thin   Aspiration Precautions: 1 bite/sip at a time, HOB to 90 degrees, No straws, Small bites/sips and Strict aspiration precautions   General Precautions: Standard, aspiration  Communication strategies:  none    History:     Past Medical History:   Diagnosis Date    Anxiety     Depression     Diabetes type 2, controlled     Headache     Hx of psychiatric care     Hypertension     Neuropathy     Psychiatric problem     Therapy     Thyroid disease        Past Surgical History:   Procedure Laterality Date    anterior cervical spine fusion      back surgery lumbar      CARPAL TUNNEL RELEASE      CHOLECYSTECTOMY      HYSTERECTOMY      left knee arthroplasty      ROTATOR CUFF REPAIR         Social History: Patient lives with spouse.      Prior diet: regular        Subjective     "Her memory has not been at good" per daughter   Patient goals: home     Pain/Comfort:  · Pain Rating 1: 0/10  · Pain Rating Post-Intervention 1: 0/10    Objective:     Cognitive Status:    Pt. was oriented to place and year with good recall of recent and remote temporal and general information.  Immediate/delayed verbal recall was impaired with pt. Repeating 5 digits and 5 words without difficulty. Responses to hypothetical verbal problem solving tasks were accurate and complete.  Pt. Compared and contrasted objects and generated multiple solutions to problems.  Thought organization and categorization skills were wfl with pt. Naming 16 animals given one minute when 15-20 are wnl.  Pt. Responded to functional math and time calculations with 80% accuracy a      Receptive Language:   Comprehension:   Pt. Responded to complex " yes/no questions and multi step commands with 100% accuracy and no delays in responding.        Pragmatics:    wfl    Expressive Language:  Verbal:    Verbal language skills were wfl with no evidence of aphasia.  Pt. Expressed their thoughts coherently in conversation with no evidence of confusion or word finding deficits        Motor Speech:  Speech was 100% intelligible in conversation with mild dysarthria evident.       Voice:   wfl    Visual-Spatial:  tba    Reading:   tba     Written Expression:   tba    Oral Musculature Evaluation  · Oral Musculature: WFL  · Dentition: present and adequate  · Mucosal Quality: good  · Mandibular Strength and Mobility: WFL  · Oral Labial Strength and Mobility: WFL  · Lingual Strength and Mobility: WFL  · Velar Elevation: WFL  · Buccal Strength and Mobility: WFL  · Volitional Cough: strong  · Volitional Swallow: no delay  · Voice Prior to PO Intake: wfl    Bedside Swallow Eval:   Consistencies Assessed:  · Thin liquids 3 teaspoons water then 2 ounces of water self fed  · Puree 2 teaspoons  · Solids 1 cracker     Oral Phase:   · WFL    Pharyngeal Phase:   · no overt clinical signs/symptoms of aspiration  · no overt clinical signs/symptoms of pharyngeal dysphagia      Assessment:     Missy Ennis is a 68 y.o. female with an SLP diagnosis of Dysarthria and Cognitive-Linguistic Impairment.      Goals:   Multidisciplinary Problems     SLP Goals        Problem: SLP Goal    Goal Priority Disciplines Outcome   SLP Goal     SLP Ongoing (interventions implemented as appropriate)   Description:  Goals due 12/12  1.  Tolerate regular diet with thin liquids with no s/s of aspiration  2.  Assess functional reading and writing skills  3.  Recall speech strategies with no cues  4.  Respond to mental manipulation tasks with 90% accuracy                     Plan:     · Patient to be seen:  4 x/week   · Plan of Care expires:  01/03/19  · Plan of Care reviewed with:  patient   · SLP Follow-Up:   Yes       Discharge recommendations:  Discharge Facility/Level Of Care Needs: home health speech therapy       Time Tracking:     SLP Treatment Date:   12/05/18  Speech Start Time:  0825  Speech Stop Time:  0850     Speech Total Time (min):  25 min    Billable Minutes: Eval 15  and Eval Swallow and Oral Function 10    Arabella Barlow MA, CCC-SLP  12/05/2018

## 2018-12-05 NOTE — PROGRESS NOTES
Consent obtained. optison given ivp via right a/c sl for Imaging. Denies transfusion rxn. Tolerated well. Sl flushed before and after with 10 cc ns.

## 2018-12-05 NOTE — ED NOTES
Stroke NP and MD Huffman at bedside to speak with pt and family about pts plan of care and imaging results.

## 2018-12-05 NOTE — CONSULTS
Inpatient consult to Physical Medicine Rehab  Consult performed by: Anahi Santamaria NP  Consult ordered by: Nicole Marino NP  Reason for consult: Assess rehab needs       Reviewed patient history and current admission.  Rehab team following.  Full consult to follow.    ESTHELA Boykin, FNP-C  Physical Medicine & Rehabilitation   12/05/2018  Spectralink: [phone]

## 2018-12-05 NOTE — PLAN OF CARE
Problem: Physical Therapy Goal  Goal: Physical Therapy Goal  Goals to be met by: 1 week ()     Patient will increase functional independence with mobility by performin. Supine to sit with supervision. Not met  2. Sit to supine with supervision. Not met  3. Sit to stand transfer with Stand-by Assistance. Not met  4. Bed to chair transfer with Contact Guard Assistance using Rolling Walker. Not met  5. Gait  x  75 feet with Contact guard assistance using Rolling Walker.  Not met  6. Ascend/descend 3 stairs with bilateral Handrails Contact Guard Assistance. Not met  7. Stand for 3 minutes with Contact Guard Assistance using Rolling Walker while performing a task. Not met  8. Lower extremity exercise program x 20 reps per handout, with independence. Not met    Outcome: Ongoing (interventions implemented as appropriate)  Goals set.

## 2018-12-05 NOTE — ED NOTES
Pt updated on admission status. Pt re-positioned in stretcher for comfort. No requests at this time.

## 2018-12-05 NOTE — ED NOTES
Bed: 12  Expected date:   Expected time:   Means of arrival:   Comments:  [place] Transfer

## 2018-12-05 NOTE — CONSULTS
"  [facility]  Adult Nutrition  Consult Note    SUMMARY     Recommendations    Recommendation/Intervention: 1. Provide Diabetic 2000kcal diet.  Goals: 1. Pt to consume >75% meals.  Nutrition Goal Status: new  Communication of RD Recs: discussed on rounds    Reason for Assessment    Reason for Assessment: consult  Diagnosis: stroke/CVA  Relevant Medical History: prior stroke, depression, DM2, HTN, neuropathy, hypothyroidism  Interdisciplinary Rounds: attended  General Information Comments: Pt s/p stroke had possible brain mass show on CT, admitted with nausea/vomiting. Per pt, N/V has resolved for now. Pt passed swallow and cleared for regular diet but has not received meal yet. Pt denies loss of appetite or wt loss PTA. Pt appears nourished. Will follow.  Nutrition Discharge Planning: Pt with adequate po intake to meet nutrition needs.    Nutrition Risk Screen         Nutrition/Diet History    Do you have any cultural, spiritual, Religion conflicts, given your current situation?: no    Anthropometrics    Temp: 97.4 °F (36.3 °C)  Height: 5' 3" (160 cm)  Height (inches): 63 in  Weight Method: Stated  Weight: 88.5 kg (195 lb)  Weight (lb): 195 lb  Ideal Body Weight (IBW), Female: 115 lb       Lab/Procedures/Meds    Pertinent Labs Reviewed: reviewed  Pertinent Labs Comments: A1c 5.9%, POCT glu 131, Phos 4.6  Pertinent Medications Reviewed: reviewed  Pertinent Medications Comments: statin, levothyroxine, MVI/Ca/Ferrous sulfate, IV NS    Physical Findings/Assessment    Overall Physical Appearance: nourished, overweight    Estimated/Assessed Needs    Weight Used For Calorie Calculations: 88.5 kg (195 lb 1.7 oz)  Energy Calorie Requirements (kcal): 1730  Energy Need Method: Chug[place]-St Jeor(PAL 1.25)  Protein Requirements: 89-106g  Weight Used For Protein Calculations: 88.5 kg (195 lb 1.7 oz)  RDA Method (mL): 1730  CHO Requirement: 50% total kcal      Nutrition Prescription Ordered    Current Diet Order: " Regular    Evaluation of Received Nutrient/Fluid Intake       % Intake of Estimated Energy Needs: 0 - 25 %  % Meal Intake: 0 - 25 %    Nutrition Risk    Level of Risk/Frequency of Follow-up: low     Assessment and Plan    Nutrition Problem  Inadequate oral intake    Related to (etiology):   Pt was NPO x 24 hrs    Signs and Symptoms (as evidenced by):   Pt with no meals since yesterday    Nutrition Diagnosis Status:   Resolved      Monitor and Evaluation    Food and Nutrient Intake: energy intake, food and beverage intake  Food and Nutrient Adminstration: diet order  Anthropometric Measurements: weight, weight change, body mass index  Biochemical Data, Medical Tests and Procedures: electrolyte and renal panel, gastrointestinal profile, glucose/endocrine profile, inflammatory profile, lipid profile  Nutrition-Focused Physical Findings: overall appearance     Nutrition Follow-Up    RD Follow-up?: Yes

## 2018-12-06 LAB
ALBUMIN SERPL BCP-MCNC: 3.3 G/DL
ALP SERPL-CCNC: 102 U/L
ALT SERPL W/O P-5'-P-CCNC: 17 U/L
ANION GAP SERPL CALC-SCNC: 8 MMOL/L
AST SERPL-CCNC: 22 U/L
BASOPHILS # BLD AUTO: 0.06 K/UL
BASOPHILS NFR BLD: 0.8 %
BILIRUB SERPL-MCNC: 0.7 MG/DL
BUN SERPL-MCNC: 16 MG/DL
CALCIUM SERPL-MCNC: 9.2 MG/DL
CHLORIDE SERPL-SCNC: 105 MMOL/L
CO2 SERPL-SCNC: 27 MMOL/L
CREAT SERPL-MCNC: 0.8 MG/DL
DIFFERENTIAL METHOD: ABNORMAL
EOSINOPHIL # BLD AUTO: 0.7 K/UL
EOSINOPHIL NFR BLD: 8.7 %
ERYTHROCYTE [DISTWIDTH] IN BLOOD BY AUTOMATED COUNT: 13.2 %
EST. GFR  (AFRICAN AMERICAN): >60 ML/MIN/1.73 M^2
EST. GFR  (NON AFRICAN AMERICAN): >60 ML/MIN/1.73 M^2
GLUCOSE SERPL-MCNC: 138 MG/DL
HCT VFR BLD AUTO: 41.6 %
HGB BLD-MCNC: 13.1 G/DL
IMM GRANULOCYTES # BLD AUTO: 0.03 K/UL
IMM GRANULOCYTES NFR BLD AUTO: 0.4 %
LYMPHOCYTES # BLD AUTO: 1.3 K/UL
LYMPHOCYTES NFR BLD: 17.1 %
MCH RBC QN AUTO: 30.8 PG
MCHC RBC AUTO-ENTMCNC: 31.5 G/DL
MCV RBC AUTO: 98 FL
MONOCYTES # BLD AUTO: 0.7 K/UL
MONOCYTES NFR BLD: 9.4 %
NEUTROPHILS # BLD AUTO: 4.9 K/UL
NEUTROPHILS NFR BLD: 63.6 %
NRBC BLD-RTO: 0 /100 WBC
PLATELET # BLD AUTO: 189 K/UL
PMV BLD AUTO: 11.6 FL
POCT GLUCOSE: 186 MG/DL (ref 70–110)
POCT GLUCOSE: 206 MG/DL (ref 70–110)
POCT GLUCOSE: 99 MG/DL (ref 70–110)
POTASSIUM SERPL-SCNC: 3.7 MMOL/L
PROT SERPL-MCNC: 6.2 G/DL
RBC # BLD AUTO: 4.26 M/UL
SODIUM SERPL-SCNC: 140 MMOL/L
WBC # BLD AUTO: 7.62 K/UL

## 2018-12-06 PROCEDURE — 20600001 HC STEP DOWN PRIVATE ROOM

## 2018-12-06 PROCEDURE — 80053 COMPREHEN METABOLIC PANEL: CPT

## 2018-12-06 PROCEDURE — 99232 SBSQ HOSP IP/OBS MODERATE 35: CPT | Mod: ,,, | Performed by: PHYSICIAN ASSISTANT

## 2018-12-06 PROCEDURE — 99233 SBSQ HOSP IP/OBS HIGH 50: CPT | Mod: GC,,, | Performed by: PSYCHIATRY & NEUROLOGY

## 2018-12-06 PROCEDURE — G8988 SELF CARE GOAL STATUS: HCPCS | Mod: CJ

## 2018-12-06 PROCEDURE — 36415 COLL VENOUS BLD VENIPUNCTURE: CPT

## 2018-12-06 PROCEDURE — 97166 OT EVAL MOD COMPLEX 45 MIN: CPT

## 2018-12-06 PROCEDURE — 85025 COMPLETE CBC W/AUTO DIFF WBC: CPT

## 2018-12-06 PROCEDURE — G8987 SELF CARE CURRENT STATUS: HCPCS | Mod: CK

## 2018-12-06 PROCEDURE — 99223 1ST HOSP IP/OBS HIGH 75: CPT | Mod: ,,, | Performed by: NURSE PRACTITIONER

## 2018-12-06 PROCEDURE — A4216 STERILE WATER/SALINE, 10 ML: HCPCS | Performed by: NURSE PRACTITIONER

## 2018-12-06 PROCEDURE — 92507 TX SP LANG VOICE COMM INDIV: CPT

## 2018-12-06 PROCEDURE — 25000003 PHARM REV CODE 250: Performed by: NURSE PRACTITIONER

## 2018-12-06 PROCEDURE — 63600175 PHARM REV CODE 636 W HCPCS: Performed by: NURSE PRACTITIONER

## 2018-12-06 PROCEDURE — 97535 SELF CARE MNGMENT TRAINING: CPT

## 2018-12-06 RX ADMIN — CARVEDILOL 6.25 MG: 6.25 TABLET, FILM COATED ORAL at 07:12

## 2018-12-06 RX ADMIN — CARVEDILOL 6.25 MG: 6.25 TABLET, FILM COATED ORAL at 05:12

## 2018-12-06 RX ADMIN — GABAPENTIN 100 MG: 100 CAPSULE ORAL at 05:12

## 2018-12-06 RX ADMIN — INSULIN ASPART 4 UNITS: 100 INJECTION, SOLUTION INTRAVENOUS; SUBCUTANEOUS at 01:12

## 2018-12-06 RX ADMIN — Medication 3 ML: at 02:12

## 2018-12-06 RX ADMIN — GABAPENTIN 100 MG: 100 CAPSULE ORAL at 01:12

## 2018-12-06 RX ADMIN — ACETAMINOPHEN 650 MG: 325 TABLET, FILM COATED ORAL at 09:12

## 2018-12-06 RX ADMIN — MULTIPLE VITAMINS W/ MINERALS TAB 1 TABLET: TAB at 09:12

## 2018-12-06 RX ADMIN — Medication 3 ML: at 09:12

## 2018-12-06 RX ADMIN — LEVOTHYROXINE SODIUM 88 MCG: 88 TABLET ORAL at 07:12

## 2018-12-06 RX ADMIN — LISINOPRIL 5 MG: 5 TABLET ORAL at 09:12

## 2018-12-06 RX ADMIN — GABAPENTIN 100 MG: 100 CAPSULE ORAL at 09:12

## 2018-12-06 RX ADMIN — ATORVASTATIN CALCIUM 40 MG: 20 TABLET, FILM COATED ORAL at 09:12

## 2018-12-06 RX ADMIN — ASPIRIN 81 MG: 81 TABLET, COATED ORAL at 09:12

## 2018-12-06 RX ADMIN — LACOSAMIDE 150 MG: 50 TABLET, FILM COATED ORAL at 09:12

## 2018-12-06 RX ADMIN — Medication 3 ML: at 05:12

## 2018-12-06 RX ADMIN — HEPARIN SODIUM 5000 UNITS: 5000 INJECTION, SOLUTION INTRAVENOUS; SUBCUTANEOUS at 09:12

## 2018-12-06 RX ADMIN — HEPARIN SODIUM 5000 UNITS: 5000 INJECTION, SOLUTION INTRAVENOUS; SUBCUTANEOUS at 01:12

## 2018-12-06 RX ADMIN — HEPARIN SODIUM 5000 UNITS: 5000 INJECTION, SOLUTION INTRAVENOUS; SUBCUTANEOUS at 05:12

## 2018-12-06 NOTE — CONSULTS
[facility]  Physical Medicine & Rehab  Consult Note    Patient Name: Missy Ennis  MRN: [id number]  Admission Date: 12/4/2018  Hospital Length of Stay: 2 days  Attending Physician: Althea Bustamante MD     Inpatient consult to Physical Medicine & Rehabilitation  Consult performed by: Asia Auguste NP  Consult requested by:  Althea Bsutamante MD    Collaborating Physician: Luz Jarrett MD  Reason for Consult:  assess rehabilitation needs    Consults  Subjective:     Principal Problem: Cerebellar stroke, acute    HPI: Missy Ennis is a 68-year-old female with PMHx of HTN, HLD, DMII, hypothyroidism, remote spinal surgeries (2013, 2015), and neuropathy (on gabapentin), depression and anxiety, R brain lesion, and recent work-up for gait instability/falls (started on Vimpat by Neurologist).  Patient presented to [facility] for new left-sided headache, left facial droop, dizziness, and N/V.  On arrival, CTH concerning for left-sided brain lesions.  Transferred [place] [facility] [place] for further evaluation and management.  Upon admission, MRI brain revealed acute L cerebellar infarct without hydrocephalus.     Functional History: Patient lives in [place] with her  in a single story home with 4 steps to enter with ramp access.  Prior to admission, she was (I) with ADLs and mod(I) with mobility.  Utilized SPC with mobility 2/2 neuropathy, back surgeries (2013, 2015).  She is right handed.  DME: SPC, shower chair.    Hospital Course: 12/5/18:  Evaluated by PT and SLP.  Found to have cognitive-linguistic impairment.  SLP recommendation: regular diet and thin liquids.  Bed mobility SBA-modA.  Sit to stand Chantel.  Ambulated 4 steps x 6 trials Chantel.      Past Medical History:   Diagnosis Date    Anxiety     Depression     Diabetes type 2, controlled     Headache     Hx of psychiatric care     Hypertension     Neuropathy     Psychiatric problem     Therapy     Thyroid disease       Past Surgical History:   Procedure Laterality Date    anterior cervical spine fusion      back surgery lumbar      CARPAL TUNNEL RELEASE      CHOLECYSTECTOMY      HYSTERECTOMY      left knee arthroplasty      ROTATOR CUFF REPAIR       Review of patient's allergies indicates:   Allergen Reactions    Codeine Itching    Morphine Other (See Comments)     Hallucinations    Sulfa (sulfonamide antibiotics) Itching       Scheduled Medications:    aspirin  81 mg Oral Daily    atorvastatin  40 mg Oral Daily    carvedilol  6.25 mg Oral BID WM    gabapentin  100 mg Oral QID    heparin (porcine)  5,000 Units Subcutaneous Q8H    lacosamide  150 mg Oral Daily    levothyroxine  88 mcg Oral Daily    lisinopril  5 mg Oral Daily    multivit-iron-FA-calcium-mins  1 tablet Oral Daily    sodium chloride 0.9%  3 mL Intravenous Q8H       PRN Medications: acetaminophen, amitriptyline, dextrose 50%, dextrose 50%, glucagon (human recombinant), glucose, glucose, influenza, insulin aspart U-100, ondansetron, pneumoc 13-devora conj-dip cr(PF), polyethylene glycol, sodium chloride 0.9%    Family History     Problem Relation (Age of Onset)    Bipolar disorder Mother        Tobacco Use    Smoking status: Former Smoker     Start date: 2/17/2005    Smokeless tobacco: Never Used   Substance and Sexual Activity    Alcohol use: No    Drug use: No    Sexual activity: Yes     Partners: Male     Review of Systems   Constitutional: Positive for activity change. Negative for chills, fatigue and fever.   HENT: Negative for drooling, hearing loss, trouble swallowing and voice change.    Eyes: Negative for pain and visual disturbance.   Respiratory: Negative for cough, shortness of breath and wheezing.    Cardiovascular: Negative for chest pain and palpitations.   Gastrointestinal: Positive for nausea (improving). Negative for abdominal distention and vomiting.   Genitourinary: Negative for difficulty urinating and flank pain.    Musculoskeletal: Positive for gait problem. Negative for arthralgias, back pain, myalgias and neck pain.   Skin: Negative for rash and wound.   Neurological: Positive for weakness. Negative for dizziness, numbness and headaches.   Psychiatric/Behavioral: Negative for agitation and hallucinations. The patient is not nervous/anxious.      Objective:     Vital Signs (Most Recent):  Temp: 97.1 °F (36.2 °C) (18 0749)  Pulse: 74 (18 1124)  Resp: 16 (18 0749)  BP: (!) 150/67 (18 0749)  SpO2: (!) 92 % (18 07)    Vital Signs (24h Range):  Temp:  [97.1 °F (36.2 °C)-98.9 °F (37.2 °C)] 97.1 °F (36.2 °C)  Pulse:  [74-90] 74  Resp:  [16-20] 16  SpO2:  [90 %-95 %] 92 %  BP: (115-151)/(63-74) 150/67     Body mass index is 34.63 kg/m².    Physical Exam   Constitutional: She is oriented to person, place, and time. She appears well-developed and well-nourished. No distress.   HENT:   Head: Normocephalic and atraumatic.   Right Ear: External ear normal.   Left Ear: External ear normal.   Nose: Nose normal.   Eyes: Right eye exhibits no discharge. Left eye exhibits no discharge. No scleral icterus.   Neck: Normal range of motion.   Cardiovascular: Normal rate, regular rhythm and intact distal pulses.   Pulmonary/Chest: Effort normal. No respiratory distress. She has no wheezes.   Abdominal: Soft. She exhibits no distension. There is no tenderness.   Musculoskeletal: Normal range of motion. She exhibits no edema or tenderness.   Neurological: She is alert and oriented to person, place, and time. No sensory deficit. She exhibits normal muscle tone. Coordination abnormal.   -  Mental Status:  AAOx3.  Follows commands.  Answers correct age and .  Recent and remote memory intact.  Recalls 3/3 objects.   -  Speech and language:  no aphasia or dysarthria.    -  Facial movement (CN VII): symmetrical.   -  Coordination:  Finger to nose exam:  RUE normal, LUE dysmetria.  -  Motor:  L pronator drift. RUE: .   LUE: 4/5.  RLE: 5/5.  LLE: 4/5.  -  Tone:  Normal.   -  Sensory:  Intact to light touch and pin prick.   Skin: Skin is warm and dry. No rash noted.   Psychiatric: She has a normal mood and affect. Her behavior is normal. Thought content normal.   Vitals reviewed.    Diagnostic Results:   Labs: Reviewed  CT: Reviewed  MRI: Reviewed    Assessment/Plan:     * Cerebellar stroke, acute    -  Presented to [facility] for new left-sided headache, left facial droop, dizziness, and N/V  -  CTH concerning for left-sided brain lesions  -  MRI brain revealed acute L cerebellar infarct without hydrocephalus  See hospital course for functional, cognitive/speech/language, and nutrition/swallow status.      Recommendations  -  Encourage mobility, OOB in chair, and early ambulation as appropriate   -  PT/OT evaluate and treat  -  SLP speech and cognitive evaluate and treat  -  Monitor mood and sleep disturbances  -  Establish consistent sleep-wake cycle  -  Monitor for bowel and bladder dysfunction  -  Monitor for shoulder pain and subluxation  -  Monitor for spasticity  -  DVT prophylaxis  -  Monitor for and prevent skin breakdown and pressure ulcers  · Early mobility, repositioning/weight shifting every 20-30 minutes when sitting, turn patient every 2 hours, proper mattress/overlay and chair cushioning, pressure relief/heel protector boots      Diabetes type 2, controlled    -  Stroke risk factor     Acquired hypothyroidism    -  On levothyroxine      Hypertension, essential    -  Stroke risk factor  -  On lisinopril, carvedilol      Recommend Inpatient Rehab.  IRF per patient/family preference.  Barriers to discharge:  Not medically ready for discharge     Thank you for your consult.     SHERRIE Blevins  Department of Physical Medicine & Rehab  [facility]

## 2018-12-06 NOTE — ASSESSMENT & PLAN NOTE
67 yo F with acute left cerebellar stroke    - Pt is neurologically stable  - MRI Brain on 12/4 shows 4th ventricle is patent without hydrocephalus  - No neurosurgical interventions indicated at this time  - EEG pending for prior questional episodes  - ASA 81 and lipitor 40  - PT/OT rec's rehab  - Care coordination per primary team  - Will follow, please call with questions or change in neurologic status

## 2018-12-06 NOTE — PLAN OF CARE
Problem: Patient Care Overview  Goal: Plan of Care Review  Outcome: Ongoing (interventions implemented as appropriate)  POC reviewed with pt and daughter at bedside.  Verbalized understanding.  VSS.  AAOX4.  Bedside commode within  reach. No falls.  No complains of pain.  Blood glucose monitored.  Fall and seizure precautions maintained. Side rails up and padded.  No seizure activity observed.  Slept well.   at bedside.  Call light within reach.  Will continue to monitor.

## 2018-12-06 NOTE — HOSPITAL COURSE
12/5: neurologically stable. No surgical interventions indicated at this time. MRI Brain shows 4th ventricle is patent without hydrocephalus.  EEG pending for prior questional episodes  12/6: DORIS. AFVSS. EEG pending  12/9: doris

## 2018-12-06 NOTE — PLAN OF CARE
CM met with patient in reference to discharge planning. Patient lives with her  in a single story home with 3 steps to get into home. Prior to admission, patient was independent with ADLs. She has the below DME but does not use on a regular basis.     PCP: Dale Valenzuela MD  Pharmacy:   [facility] [place]  [place] - [address]  [address]  [place] [address]  Phone: [phone] Fax: [phone]       12/05/18 4894   Discharge Assessment   Assessment Type Discharge Planning Assessment   Confirmed/corrected address and phone number on facesheet? Yes   Assessment information obtained from? Patient   Expected Length of Stay (days) (TBD)   Communicated expected length of stay with patient/caregiver yes   Prior to hospitilization cognitive status: Alert/Oriented;No Deficits   Prior to hospitalization functional status: Independent   Current cognitive status: Alert/Oriented   Current Functional Status: Assistive Equipment;Needs Assistance   Lives With spouse   Able to Return to Prior Arrangements unable to determine at this time (comments)   Is patient able to care for self after discharge? Unable to determine at this time (comments)   Who are your caregiver(s) and their phone number(s)? Foret,April Daughter [phone]    Patient's perception of discharge disposition home or selfcare   Readmission Within The Last 30 Days no previous admission in last 30 days   Patient currently being followed by outpatient case management? No   Patient currently receives any other outside agency services? No   Equipment Currently Used at Home walker, rolling;bath bench;bedside commode;grab bar   Do you have any problems affording any of your prescribed medications? No   Is the patient taking medications as prescribed? yes   Does the patient have transportation home? Yes   Transportation Available car;family or friend will provide   Does the patient receive services at the [facility]? No   Discharge Plan A  Rehab   Discharge Plan B Home Health;Home with family   Patient/Family In Agreement With Plan yes

## 2018-12-06 NOTE — HOSPITAL COURSE
69 y/o F admitted to vascular neurology because of an acute infarct in the left superficial cerebellar artery. There is small lacunal infarct in the posterior cerebellum and nonspecific signal intensity located in the right posterior occipital lobe and posterior parietal lobe (mass versus infarct). As patient was having questionable prior seizure like activity in the past, she was also put on an 24 hour video EEG which did not show any seizure like activity. As per NSGY patient does not need surgical intervention at this point. PT/OT recommends disposition to inpatient rehab. As per interrogation of the loop recorder, patient did not have any cardiac events after the stroke except for atrial tachycardia. Patient medically ready for discharge today. Hypercoagulability work up to be completed at neurovascular follow up appointment.

## 2018-12-06 NOTE — HPI
Missy Ennis is a 68-year-old female with PMHx of HTN, HLD, DMII, hypothyroidism, remote spinal surgeries (2013, 2015), and neuropathy (on gabapentin), depression and anxiety, R brain lesion, and recent work-up for gait instability/falls (started on Vimpat by Neurologist).  Patient presented to [facility] for new left-sided headache, left facial droop, dizziness, and N/V.  On arrival, CTH concerning for left-sided brain lesions.  Transferred to [facility] on 12/4/18 for further evaluation and management.  Upon admission, MRI brain revealed acute L cerebellar infarct without hydrocephalus.     Functional History: Patient lives in [place] with her  in a single story home with 4 steps to enter with ramp access.  Prior to admission, she was (I) with ADLs and mod(I) with mobility.  Utilized SPC with mobility 2/2 neuropathy, back surgeries (2013, 2015).  She is right handed.  DME: SPC, shower chair.

## 2018-12-06 NOTE — SUBJECTIVE & OBJECTIVE
Interval History: pt states she is on ASA 81 at home 2/2 a previous stroke. Pt tolerating diet. Pt denies dizziness, imbalance, n/v, new weakness, vision disturbances.  at bedside and  called daughter and care was discussed with pt and family.    Medications:  Continuous Infusions:   sodium chloride 0.9%       Scheduled Meds:   aspirin  81 mg Oral Daily    atorvastatin  40 mg Oral Daily    carvedilol  6.25 mg Oral BID WM    gabapentin  100 mg Oral QID    heparin (porcine)  5,000 Units Subcutaneous Q8H    lacosamide  150 mg Oral Daily    levothyroxine  88 mcg Oral Daily    lisinopril  5 mg Oral Daily    multivit-iron-FA-calcium-mins  1 tablet Oral Daily    sodium chloride 0.9%  3 mL Intravenous Q8H     PRN Meds:acetaminophen, amitriptyline, dextrose 50%, dextrose 50%, glucagon (human recombinant), glucose, glucose, influenza, insulin aspart U-100, ondansetron, pneumoc 13-devora conj-dip cr(PF), polyethylene glycol, sodium chloride 0.9%     Review of Systems  Objective:     Weight: 90.1 kg (198 lb 10.2 oz)  Body mass index is 34.63 kg/m².  Vital Signs (Most Recent):  Temp: 98 °F (36.7 °C) (12/06/18 1526)  Pulse: 71 (12/06/18 1526)  Resp: 16 (12/06/18 1526)  BP: 137/83 (12/06/18 1526)  SpO2: 97 % (12/06/18 1526) Vital Signs (24h Range):  Temp:  [97.1 °F (36.2 °C)-98.9 °F (37.2 °C)] 98 °F (36.7 °C)  Pulse:  [71-90] 71  Resp:  [16-20] 16  SpO2:  [90 %-97 %] 97 %  BP: (115-151)/(63-95) 137/83     Date 12/06/18 0700 - 12/07/18 0659   Shift 8994-0590 4101-6884 5129-6563 24 Hour Total   INTAKE   P.O. 240   240   Shift Total(mL/kg) 240(2.7)   240(2.7)   OUTPUT   Shift Total(mL/kg)       Weight (kg) 90.1 90.1 90.1 90.1                        Neurosurgery Physical Exam  General: well developed, well nourished, no distress.   Head: normocephalic, atraumatic  Neurologic: Alert and oriented. Thought content appropriate.  GCS: Motor: 6/Verbal: 5/Eyes: 4 GCS Total: 15  Mental Status: Awake, Alert, Oriented x  4  Language: No aphasia  Speech: No dysarthria  Cranial nerves: face symmetric, tongue midline, CN II-XII grossly intact.   Eyes: pupils equal, round, reactive to light, EOMI.   Pulmonary: normal respirations, no signs of respiratory distress  Sensory: intact to light touch throughout    Motor Strength:Moves all extremities spontaneously with good tone.  Full strength upper and lower extremities. No abnormal movements seen.     Strength  Deltoids Triceps Biceps Wrist Extension Wrist Flexion Hand    Upper: R 5/5 5/5 5/5 5/5 5/5 5/5    L 5/5 5/5 5/5 5/5 5/5 5/5     Iliopsoas Quadriceps Knee  Flexion Tibialis  anterior Gastro- cnemius EHL   Lower: R 5/5 5/5 5/5 5/5 5/5 5/5    L 4/5 5/5 5/5 4/5 4+/5 5/5     Pronator Drift: no drift noted  Finger-to-nose: mild dysmetria on left side.   Henning: absent  Clonus: absent  Babinski: absent  Skin: Skin is warm, dry and intact.      Significant Labs:  Recent Labs   Lab 12/04/18 2040 12/05/18 0430 12/06/18  0545   GLU  --  118* 138*   NA  --  140 140   K  --  4.0 3.7   CL  --  104 105   CO2  --  27 27   BUN  --  13 16   CREATININE 0.7 0.8 0.8   CALCIUM  --  9.5 9.2   MG  --  2.3  --      Recent Labs   Lab 12/05/18 0430 12/06/18  0545   WBC 8.04 7.62   HGB 13.8 13.1   HCT 43.3 41.6    189     Recent Labs   Lab 12/05/18  0430   INR 0.9   APTT 22.9     Microbiology Results (last 7 days)     ** No results found for the last 168 hours. **        All pertinent labs from the last 24 hours have been reviewed.    Significant Diagnostics:  No new imaging for review

## 2018-12-06 NOTE — PLAN OF CARE
Problem: Occupational Therapy Goal  Goal: Occupational Therapy Goal  Goals to be met by: 12/16     Patient will increase functional independence with ADLs by performing:    Bed mobility and supine to sit with CGA and HOB flat.  UE Dressing with Set-up Assistance and Supervision.  LE Dressing (pants, brief) with Set-up Assistance and Stand-by Assistance.  Grooming while standing at sink with Stand-by Assistance.  Toileting from toilet with Stand-by Assistance for hygiene and clothing management.   Toilet transfer to toilet with Stand-by Assistance.  Functional mobility at household distance for ADL task with CGA and AD as needed.  Pt/CG verbalized understanding for s/s of stroke.   Pt will follow multi step commands without added cues or time.   Pt will complete hand eye coordination task for L UE with 4/5 accuracy.     Outcome: Ongoing (interventions implemented as appropriate)  OT eval completed. Rec rehab at this time. OT to follow up 4x/w in acute setting. RAYO Parker 12/6/2018

## 2018-12-06 NOTE — ASSESSMENT & PLAN NOTE
69 y/o female with left cerebellar stroke acute infarct in the left superficial cerebellar artery with small lacunar infarcts in the posterior cerebellum. There is also an area of  nonenhancing heterogenous signal intensity located in the right posterior occipital lobe and posterior parietal lobe. There is questionable seizure like activity in patient, thus patient is on a 24 hour video EEG monitoring    Antithrombotics: aspirin 81 mg daily    Statins: Lipitor 40 mg daily    Aggressive risk factor modification: HTN, DM     Rehab efforts: PT/OT/SLP to evaluate and treat- as per rehab patient to be discharged to rehab facility    Diagnostics ordered/pending: HgA1c 5.9, TSH 5.218, MRA of neck and brain pending    VTE prophylaxis: Heparin 5000 units SQ every 8 hours, SCD's    BP parameters: Infarct: No intervention, SBP <220

## 2018-12-06 NOTE — SUBJECTIVE & OBJECTIVE
Past Medical History:   Diagnosis Date    Anxiety     Depression     Diabetes type 2, controlled     Headache     Hx of psychiatric care     Hypertension     Neuropathy     Psychiatric problem     Therapy     Thyroid disease      Past Surgical History:   Procedure Laterality Date    anterior cervical spine fusion      back surgery lumbar      CARPAL TUNNEL RELEASE      CHOLECYSTECTOMY      HYSTERECTOMY      left knee arthroplasty      ROTATOR CUFF REPAIR       Review of patient's allergies indicates:   Allergen Reactions    Codeine Itching    Morphine Other (See Comments)     Hallucinations    Sulfa (sulfonamide antibiotics) Itching       Scheduled Medications:    aspirin  81 mg Oral Daily    atorvastatin  40 mg Oral Daily    carvedilol  6.25 mg Oral BID WM    gabapentin  100 mg Oral QID    heparin (porcine)  5,000 Units Subcutaneous Q8H    lacosamide  150 mg Oral Daily    levothyroxine  88 mcg Oral Daily    lisinopril  5 mg Oral Daily    multivit-iron-FA-calcium-mins  1 tablet Oral Daily    sodium chloride 0.9%  3 mL Intravenous Q8H       PRN Medications: acetaminophen, amitriptyline, dextrose 50%, dextrose 50%, glucagon (human recombinant), glucose, glucose, influenza, insulin aspart U-100, ondansetron, pneumoc 13-devora conj-dip cr(PF), polyethylene glycol, sodium chloride 0.9%    Family History     Problem Relation (Age of Onset)    Bipolar disorder Mother        Tobacco Use    Smoking status: Former Smoker     Start date: 2/17/2005    Smokeless tobacco: Never Used   Substance and Sexual Activity    Alcohol use: No    Drug use: No    Sexual activity: Yes     Partners: Male     Review of Systems   Constitutional: Positive for activity change. Negative for chills, fatigue and fever.   HENT: Negative for drooling, hearing loss, trouble swallowing and voice change.    Eyes: Negative for pain and visual disturbance.   Respiratory: Negative for cough, shortness of breath and wheezing.     Cardiovascular: Negative for chest pain and palpitations.   Gastrointestinal: Positive for nausea (improving). Negative for abdominal distention and vomiting.   Genitourinary: Negative for difficulty urinating and flank pain.   Musculoskeletal: Positive for gait problem. Negative for arthralgias, back pain, myalgias and neck pain.   Skin: Negative for rash and wound.   Neurological: Positive for weakness. Negative for dizziness, numbness and headaches.   Psychiatric/Behavioral: Negative for agitation and hallucinations. The patient is not nervous/anxious.      Objective:     Vital Signs (Most Recent):  Temp: 97.1 °F (36.2 °C) (18)  Pulse: 74 (18 1124)  Resp: 16 (18)  BP: (!) 150/67 (18)  SpO2: (!) 92 % (18)    Vital Signs (24h Range):  Temp:  [97.1 °F (36.2 °C)-98.9 °F (37.2 °C)] 97.1 °F (36.2 °C)  Pulse:  [74-90] 74  Resp:  [16-20] 16  SpO2:  [90 %-95 %] 92 %  BP: (115-151)/(63-74) 150/67     Body mass index is 34.63 kg/m².    Physical Exam   Constitutional: She is oriented to person, place, and time. She appears well-developed and well-nourished. No distress.   HENT:   Head: Normocephalic and atraumatic.   Right Ear: External ear normal.   Left Ear: External ear normal.   Nose: Nose normal.   Eyes: Right eye exhibits no discharge. Left eye exhibits no discharge. No scleral icterus.   Neck: Normal range of motion.   Cardiovascular: Normal rate, regular rhythm and intact distal pulses.   Pulmonary/Chest: Effort normal. No respiratory distress. She has no wheezes.   Abdominal: Soft. She exhibits no distension. There is no tenderness.   Musculoskeletal: Normal range of motion. She exhibits no edema or tenderness.   Neurological: She is alert and oriented to person, place, and time. No sensory deficit. She exhibits normal muscle tone. Coordination abnormal.   -  Mental Status:  AAOx3.  Follows commands.  Answers correct age and .  Recent and remote memory intact.   Recalls 3/3 objects.   -  Speech and language:  no aphasia or dysarthria.    -  Facial movement (CN VII): symmetrical.   -  Coordination:  Finger to nose exam:  RUE normal, LUE dysmetria.  -  Motor:  L pronator drift. RUE: 5/5.  LUE: 4/5.  RLE: 5/5.  LLE: 4/5.  -  Tone:  Normal.   -  Sensory:  Intact to light touch and pin prick.   Skin: Skin is warm and dry. No rash noted.   Psychiatric: She has a normal mood and affect. Her behavior is normal. Thought content normal.   Vitals reviewed.    NEUROLOGICAL EXAMINATION:     MENTAL STATUS   Oriented to person, place, and time.       Diagnostic Results:   Labs: Reviewed  CT: Reviewed  MRI: Reviewed

## 2018-12-06 NOTE — ASSESSMENT & PLAN NOTE
-  Presented to [facility] for new left-sided headache, left facial droop, dizziness, and N/V  -  CTH concerning for left-sided brain lesions  -  MRI brain revealed acute L cerebellar infarct without hydrocephalus  See hospital course for functional, cognitive/speech/language, and nutrition/swallow status.      Recommendations  -  Encourage mobility, OOB in chair, and early ambulation as appropriate   -  PT/OT evaluate and treat  -  SLP speech and cognitive evaluate and treat  -  Monitor mood and sleep disturbances  -  Establish consistent sleep-wake cycle  -  Monitor for bowel and bladder dysfunction  -  Monitor for shoulder pain and subluxation  -  Monitor for spasticity  -  DVT prophylaxis  -  Monitor for and prevent skin breakdown and pressure ulcers  · Early mobility, repositioning/weight shifting every 20-30 minutes when sitting, turn patient every 2 hours, proper mattress/overlay and chair cushioning, pressure relief/heel protector boots

## 2018-12-06 NOTE — CONSULTS
[facility]  Neurosurgery  Consult Note    Consults  Subjective:     Chief Complaint: cerebellar stroke    History of Present Illness: 67 y/o female with PMHx of HTN, DM and prior stroke who had presented with worsening dizziness and N/V found to have and acute infarct Left cerebellum.  HCT without hydrocephalus. She has also been have episodes of falling and not knowing why, she has seen neurologist to get checked out for seizure activity and was placed on Vimpat. Neurosurgery consulted for evaluation. Pt takes 81 aspirin at home.            Medications Prior to Admission   Medication Sig Dispense Refill Last Dose    amitriptyline (ELAVIL) 25 MG tablet Take 25 mg by mouth nightly as needed for Insomnia.   12/3/2018    aspirin (ECOTRIN) 81 MG EC tablet Take 81 mg by mouth once daily.   12/3/2018    atorvastatin (LIPITOR) 40 MG tablet Take 40 mg by mouth once daily.   12/3/2018    carvedilol (COREG) 6.25 MG tablet Take 6.25 mg by mouth 2 (two) times daily with meals.   12/3/2018    duloxetine (CYMBALTA) 60 MG capsule Take 1 capsule (60 mg total) by mouth once daily. 30 capsule 2 12/3/2018    gabapentin (NEURONTIN) 100 MG capsule Take 100 mg by mouth 4 (four) times daily.   12/3/2018    glimepiride (AMARYL) 2 MG tablet Take 2 mg by mouth before breakfast.   12/3/2018    glucosamine-chondroitin 500-400 mg tablet Take 1 tablet by mouth 3 (three) times daily.   12/3/2018    lacosamide (VIMPAT) 150 mg Tab Take by mouth.   12/3/2018    levothyroxine (SYNTHROID) 88 MCG tablet Take 88 mcg by mouth once daily.   12/3/2018    lisinopril (PRINIVIL,ZESTRIL) 5 MG tablet Take 5 mg by mouth once daily.   12/3/2018    MULTIVIT-IRON-MIN-FOLIC ACID 3,500-18-0.4 UNIT-MG-MG ORAL CHEW Take by mouth.   12/3/2018    omeprazole (PRILOSEC) 40 MG capsule Take 40 mg by mouth once daily.   12/3/2018    pioglitazone (ACTOS) 15 MG tablet Take 15 mg by mouth once daily.   12/3/2018       Review of patient's  allergies indicates:   Allergen Reactions    Codeine Itching    Morphine Other (See Comments)     Hallucinations    Sulfa (sulfonamide antibiotics) Itching       Past Medical History:   Diagnosis Date    Anxiety     Depression     Diabetes type 2, controlled     Headache     Hx of psychiatric care     Hypertension     Neuropathy     Psychiatric problem     Therapy     Thyroid disease      Past Surgical History:   Procedure Laterality Date    anterior cervical spine fusion      back surgery lumbar      CARPAL TUNNEL RELEASE      CHOLECYSTECTOMY      HYSTERECTOMY      left knee arthroplasty      ROTATOR CUFF REPAIR       Family History     Problem Relation (Age of Onset)    Bipolar disorder Mother        Tobacco Use    Smoking status: Former Smoker     Start date: 2/17/2005    Smokeless tobacco: Never Used   Substance and Sexual Activity    Alcohol use: No    Drug use: No    Sexual activity: Yes     Partners: Male     Review of Systems   Constitutional: no fever or chills  Eyes: no visual changes  ENT: no nasal congestion or sore throat  Respiratory: no cough or shortness of breath  Cardiovascular: no chest pain or palpitations  Gastrointestinal: no nausea or vomiting  Genitourinary: no hematuria or dysuria  Integument/Breast: no rash or pruritis  Hematologic/Lymphatic: no easy bruising or lymphadenopathy  Musculoskeletal: no arthralgias or myalgias  Neurological: no seizures or tremors    Objective:     Weight: 88.5 kg (195 lb)  Body mass index is 34.54 kg/m².  Vital Signs (Most Recent):  Temp: 98.8 °F (37.1 °C) (12/05/18 1935)  Pulse: 75 (12/05/18 2325)  Resp: 20 (12/05/18 1935)  BP: (!) 151/67 (12/05/18 1935)  SpO2: (!) 94 % (12/05/18 1935) Vital Signs (24h Range):  Temp:  [97.4 °F (36.3 °C)-98.9 °F (37.2 °C)] 98.8 °F (37.1 °C)  Pulse:  [72-89] 75  Resp:  [12-20] 20  SpO2:  [90 %-100 %] 94 %  BP: (117-172)/(62-94) 151/67                           Neurosurgery Physical Exam   General: no  distress  Neurologic: Alert and oriented.   Head: normocephalic.  EEG in place  GCS: Motor: 6/Verbal: 5/Eyes: 4 GCS Total: 15  Cranial nerves: face symmetric, tongue midline, pupils equal, round, reactive to light with accomodation, extraocular muscles intact  Sensory: response to light touch throughout  Motor Strength:full strength upper and lower extremities  Pronator Drift: no drift noted  Finger to nose + mild dysmetria  No focal numbness or weakness  Lungs:  normal respiratory effort  Abdomen: soft, non-tender   Extremities: no cyanosis or edema, or clubbing      Significant Labs:  Recent Labs   Lab 12/04/18 2040 12/05/18 0430   GLU  --  118*   NA  --  140   K  --  4.0   CL  --  104   CO2  --  27   BUN  --  13   CREATININE 0.7 0.8   CALCIUM  --  9.5   MG  --  2.3     Recent Labs   Lab 12/05/18 0430   WBC 8.04   HGB 13.8   HCT 43.3        Recent Labs   Lab 12/05/18 0430   INR 0.9   APTT 22.9     Microbiology Results (last 7 days)     ** No results found for the last 168 hours. **          Significant Diagnostics: personally reviewed  MRI Brain w/ and w/o  Impression       Findings suggestive of acute infarct in the territory of the left superficial cerebellar artery.    Abnormal signal intensity in the right frontal lobe probably represents encephalomalacia as a sequela of prior infarct seen in this region seen on MRI brain 04/18/2016.  There is no enhancement and the moderate surrounding T2/FLAIR signal hyperintensity may represent more confluent white matter disease.  In this patient with reported history of a brain lesion, white matter post radiation changes could appear similar and correlation with history of radiation is recommended.    Additionally there is a nonspecific area of nonenhancing heterogenous signal intensity located in the right posterior occipital lobe and posterior parietal lobe.  While these may represent sequela of prior infarcts, given history of brain mass, suggest correlation  with recent prior outside imaging studies.    Small remote lacunar type infarcts in the posterior cerebellum bilaterally.    This report was flagged in Epic as abnormal.    COMMUNICATION  This critical result was discovered/received at 12/04/2018 at 22:30.  The critical information above was relayed directly by me by telephone to Dr. Sadler on 12/04/2018 at 22:31.    Electronically signed by resident: Moise Gray  Date: 12/04/2018  Time: 22:03    Electronically signed by: Jewel Eason MD  Date: 12/04/2018  Time: 22:59         Assessment/Plan:     * Cerebellar stroke, acute    67 yo F with acute left cerebellar stroke  - Pt is neurologically stable  - MRI Brain shows 4th ventricle is patent without hydrocephalus  - No neurosurgical interventions indicated at this time  - EEG pending for prior questional episodes  - Will follow, please call with questions or change in neurologic status  - Discussed with NICO Moseley  Neurosurgery  [facility]

## 2018-12-06 NOTE — HOSPITAL COURSE
12/5/18:  Evaluated by PT and SLP.  Found to have cognitive-linguistic impairment.  SLP recommendation: regular diet and thin liquids.  Bed mobility SBA-modA.  Sit to stand Chantel.  Ambulated 4 steps x 6 trials Chantel.    12/6/18:  Evaluated by OT.  Participated with SLP.  Bed mobility min-modA.  Sit to stand Chantel and transfers min-modA.  Ambulated 4 steps Chantel.  UBD Chantel and LBD modA.  Toileting Chantel.

## 2018-12-06 NOTE — ASSESSMENT & PLAN NOTE
69 yo F with acute left cerebellar stroke  - Pt is neurologically stable  - MRI Brain shows 4th ventricle is patent without hydrocephalus  - No neurosurgical interventions indicated at this time  - EEG pending for prior questional episodes  - Will follow, please call with questions or change in neurologic status  - Discussed with Dr. Chacko

## 2018-12-06 NOTE — SUBJECTIVE & OBJECTIVE
Medications Prior to Admission   Medication Sig Dispense Refill Last Dose    amitriptyline (ELAVIL) 25 MG tablet Take 25 mg by mouth nightly as needed for Insomnia.   12/3/2018    aspirin (ECOTRIN) 81 MG EC tablet Take 81 mg by mouth once daily.   12/3/2018    atorvastatin (LIPITOR) 40 MG tablet Take 40 mg by mouth once daily.   12/3/2018    carvedilol (COREG) 6.25 MG tablet Take 6.25 mg by mouth 2 (two) times daily with meals.   12/3/2018    duloxetine (CYMBALTA) 60 MG capsule Take 1 capsule (60 mg total) by mouth once daily. 30 capsule 2 12/3/2018    gabapentin (NEURONTIN) 100 MG capsule Take 100 mg by mouth 4 (four) times daily.   12/3/2018    glimepiride (AMARYL) 2 MG tablet Take 2 mg by mouth before breakfast.   12/3/2018    glucosamine-chondroitin 500-400 mg tablet Take 1 tablet by mouth 3 (three) times daily.   12/3/2018    lacosamide (VIMPAT) 150 mg Tab Take by mouth.   12/3/2018    levothyroxine (SYNTHROID) 88 MCG tablet Take 88 mcg by mouth once daily.   12/3/2018    lisinopril (PRINIVIL,ZESTRIL) 5 MG tablet Take 5 mg by mouth once daily.   12/3/2018    MULTIVIT-IRON-MIN-FOLIC ACID 3,500-18-0.4 UNIT-MG-MG ORAL CHEW Take by mouth.   12/3/2018    omeprazole (PRILOSEC) 40 MG capsule Take 40 mg by mouth once daily.   12/3/2018    pioglitazone (ACTOS) 15 MG tablet Take 15 mg by mouth once daily.   12/3/2018       Review of patient's allergies indicates:   Allergen Reactions    Codeine Itching    Morphine Other (See Comments)     Hallucinations    Sulfa (sulfonamide antibiotics) Itching       Past Medical History:   Diagnosis Date    Anxiety     Depression     Diabetes type 2, controlled     Headache     Hx of psychiatric care     Hypertension     Neuropathy     Psychiatric problem     Therapy     Thyroid disease      Past Surgical History:   Procedure Laterality Date    anterior cervical spine fusion      back surgery lumbar      CARPAL TUNNEL RELEASE      CHOLECYSTECTOMY       HYSTERECTOMY      left knee arthroplasty      ROTATOR CUFF REPAIR       Family History     Problem Relation (Age of Onset)    Bipolar disorder Mother        Tobacco Use    Smoking status: Former Smoker     Start date: 2/17/2005    Smokeless tobacco: Never Used   Substance and Sexual Activity    Alcohol use: No    Drug use: No    Sexual activity: Yes     Partners: Male     Review of Systems   Constitutional: no fever or chills  Eyes: no visual changes  ENT: no nasal congestion or sore throat  Respiratory: no cough or shortness of breath  Cardiovascular: no chest pain or palpitations  Gastrointestinal: no nausea or vomiting  Genitourinary: no hematuria or dysuria  Integument/Breast: no rash or pruritis  Hematologic/Lymphatic: no easy bruising or lymphadenopathy  Musculoskeletal: no arthralgias or myalgias  Neurological: no seizures or tremors    Objective:     Weight: 88.5 kg (195 lb)  Body mass index is 34.54 kg/m².  Vital Signs (Most Recent):  Temp: 98.8 °F (37.1 °C) (12/05/18 1935)  Pulse: 75 (12/05/18 2325)  Resp: 20 (12/05/18 1935)  BP: (!) 151/67 (12/05/18 1935)  SpO2: (!) 94 % (12/05/18 1935) Vital Signs (24h Range):  Temp:  [97.4 °F (36.3 °C)-98.9 °F (37.2 °C)] 98.8 °F (37.1 °C)  Pulse:  [72-89] 75  Resp:  [12-20] 20  SpO2:  [90 %-100 %] 94 %  BP: (117-172)/(62-94) 151/67                           Neurosurgery Physical Exam   General: no distress  Neurologic: Alert and oriented.   Head: normocephalic.  EEG in place  GCS: Motor: 6/Verbal: 5/Eyes: 4 GCS Total: 15  Cranial nerves: face symmetric, tongue midline, pupils equal, round, reactive to light with accomodation, extraocular muscles intact  Sensory: response to light touch throughout  Motor Strength:full strength upper and lower extremities  Pronator Drift: no drift noted  Finger to nose + mild dysmetria  No focal numbness or weakness  Lungs:  normal respiratory effort  Abdomen: soft, non-tender   Extremities: no cyanosis or edema, or  clubbing      Significant Labs:  Recent Labs   Lab 12/04/18 2040 12/05/18 0430   GLU  --  118*   NA  --  140   K  --  4.0   CL  --  104   CO2  --  27   BUN  --  13   CREATININE 0.7 0.8   CALCIUM  --  9.5   MG  --  2.3     Recent Labs   Lab 12/05/18 0430   WBC 8.04   HGB 13.8   HCT 43.3        Recent Labs   Lab 12/05/18 0430   INR 0.9   APTT 22.9     Microbiology Results (last 7 days)     ** No results found for the last 168 hours. **          Significant Diagnostics: personally reviewed  MRI Brain w/ and w/o  Impression       Findings suggestive of acute infarct in the territory of the left superficial cerebellar artery.    Abnormal signal intensity in the right frontal lobe probably represents encephalomalacia as a sequela of prior infarct seen in this region seen on MRI brain 04/18/2016.  There is no enhancement and the moderate surrounding T2/FLAIR signal hyperintensity may represent more confluent white matter disease.  In this patient with reported history of a brain lesion, white matter post radiation changes could appear similar and correlation with history of radiation is recommended.    Additionally there is a nonspecific area of nonenhancing heterogenous signal intensity located in the right posterior occipital lobe and posterior parietal lobe.  While these may represent sequela of prior infarcts, given history of brain mass, suggest correlation with recent prior outside imaging studies.    Small remote lacunar type infarcts in the posterior cerebellum bilaterally.    This report was flagged in Epic as abnormal.    COMMUNICATION  This critical result was discovered/received at 12/04/2018 at 22:30.  The critical information above was relayed directly by me by telephone to Dr. Sadler on 12/04/2018 at 22:31.    Electronically signed by resident: Moise Gray  Date: 12/04/2018  Time: 22:03    Electronically signed by: Jewel Eason MD  Date: 12/04/2018  Time: 22:59

## 2018-12-06 NOTE — PLAN OF CARE
Problem: SLP Goal  Goal: SLP Goal  Goals due 12/12  1.  Tolerate regular diet with thin liquids with no s/s of aspiration  2.  Assess functional reading and writing skills  3.  Recall speech strategies with no cues  4.  Respond to mental manipulation tasks with 90% accuracy    Pt reported recent confusion prior to admission.     TEA Julio, CCC-SLP  12/6/2018  -

## 2018-12-06 NOTE — PT/OT/SLP PROGRESS
"Speech Language Pathology Treatment    Patient Name:  Missy Ennis   MRN:  [id number]  Admitting Diagnosis: Cerebellar stroke, acute    Recommendations:                 General Recommendations:  Cognitive-linguistic therapy  Diet recommendations:  Regular, Liquid Diet Level: Thin   Aspiration Precautions: Standard aspiration precautions   General Precautions: Standard, aspiration, fall  Communication strategies:  none    Subjective     " I have to sit down at home and think about it at times"  Patient goals: go home    Pain/Comfort:  · Pain Rating 1: 0/10  · Pain Rating Post-Intervention 1: 0/10    Objective:     Has the patient been evaluated by SLP for swallowing?   Yes  Keep patient NPO? No   Current Respiratory Status: room air      Pt awake/alert with good participation in session. Pt and spouse reported pt's speech to be about 80-90% back to baseline. Spouse reported pt with fast rate of speech at baseline. Pt did not recall speech strategies but reviewed them with her. Pt completed simple reading and writing task without difficulty. No visuals spatial deficits noted. Pt reported confusion prior to admission and reported that she had trouble multi tasking. She stated she often would just have to sit down and think about what she was doing before she could complete task. Education provided to pt and spouse re: role of slp and cognition/speech. Nursing and  arrived in room and session ended. White board updated.     Assessment:     Missy Ennis is a 68 y.o. female with an SLP diagnosis of Cognitive-Linguistic Impairment.  She presents with mild speech and cognitive deficits.    Goals:   Multidisciplinary Problems     SLP Goals        Problem: SLP Goal    Goal Priority Disciplines Outcome   SLP Goal     SLP Ongoing (interventions implemented as appropriate)   Description:  Goals due 12/12  1.  Tolerate regular diet with thin liquids with no s/s of aspiration  2.  Assess functional reading and " writing skills  3.  Recall speech strategies with no cues  4.  Respond to mental manipulation tasks with 90% accuracy                     Plan:     · Patient to be seen:  4 x/week   · Plan of Care expires:  01/03/19  · Plan of Care reviewed with:  patient, spouse   · SLP Follow-Up:  Yes       Discharge recommendations:  rehabilitation facility       Time Tracking:     SLP Treatment Date:   12/06/18  Speech Start Time:  1339  Speech Stop Time:  1349     Speech Total Time (min):  10 min    Billable Minutes: Speech Therapy Individual 10    TEA Julio, CCC-SLP  12/06/2018

## 2018-12-06 NOTE — PT/OT/SLP EVAL
"Occupational Therapy   Evaluation    Name: Missy Ennis  MRN: [id number]  Admitting Diagnosis:  Cerebellar stroke, acute      Recommendations:     Discharge Recommendations: rehabilitation facility  Discharge Equipment Recommendations:  walker, rolling, bedside commode  Barriers to discharge:  Other (Comment)(fall risk; level of skilled assistance required)    History:     Occupational Profile:  Living Environment: Pt lives with spouse in [facility] with 3-4 steps to enter. Home also has ramp enterance. Home with tub/shower combo.   Previous level of function: diabetic neuropathy in B LE/feet. Mod(I) with ADLs/self care/mobility. occassionally still driving- reported difficulty with night driving 2/2 "night blindness" reported prior to admit, increased difficulty with mobility and activity with noted issues in L UE. Wears glasses.   Roles and Routines: spouse, mother, care taker to self, home and community dweller  Equipment Used at Home:  shower chair, cane, straight  Assistance upon Discharge: yes, spouse is retired     Past Medical History:   Diagnosis Date    Anxiety     Depression     Diabetes type 2, controlled     Headache     Hx of psychiatric care     Hypertension     Neuropathy     Psychiatric problem     Therapy     Thyroid disease        Past Surgical History:   Procedure Laterality Date    anterior cervical spine fusion      back surgery lumbar      CARPAL TUNNEL RELEASE      CHOLECYSTECTOMY      HYSTERECTOMY      left knee arthroplasty      ROTATOR CUFF REPAIR         Subjective     Chief Complaint: "my left arm just doesn't work right anymore"  Patient/Family Comments/goals: "Get this thing off my head soon"    Pain/Comfort:  · Pain Rating 1: 0/10  · Pain Rating Post-Intervention 1: 0/10    Patients cultural, spiritual, Yazdanism conflicts given the current situation: none reported    Objective:     Communicated with: RN prior to session.  Patient found with: HOB elevated and bed alarm, " peripheral IV, telemetry, EEG, seizure precaution padding on bed upon OT entry to room.    General Precautions: Standard, aspiration, diabetic, fall   Orthopedic Precautions:N/A   Braces: N/A     Occupational Performance:    Bed Mobility:  HOB flat  · Patient completed Rolling/Turning to Left with  minimum assistance and with side rail  · Patient completed Scooting/Bridging with moderate assistance  · Patient completed Supine to Sit with moderate assistance  · Patient completed Sit to Supine with minimum assistance    Functional Mobility/Transfers:  · Patient completed Sit <> Stand Transfer with minimum assistance  with  no assistive device   · Patient completed Toilet Transfer Step Transfer technique with minimum assistance with  no AD-- to bedside commode  · Step transfer from bedside commode to EOB with moderate assistance and no AD  · Functional Mobility: 4 steps to/from commode with min(A)  · Unable to complete further mobility 2/2 EEG at this time    Activities of Daily Living:  · Upper Body Dressing: minimum assistance EOB to doff/don gown  · Lower Body Dressing: moderate assistance B socks EOB; requiring added postural control for balance with tasks  · Toileting: minimum assistance perineal care; verbal cues for how to initiate task to self complete      Cognitive/Visual Perceptual:  Cognitive/Psychosocial Skills:     -       Oriented to: Person, Place, Time and Situation   -       Follows Commands/attention:Difficulty with 3+ step commands  -       Communication: clear/fluent  -       Memory: Poor immediate recall  -       Safety awareness/insight to disability: impaired   -       Mood/Affect/Coping skills/emotional control: Cooperative  Visual/Perceptual:      -Intact functional tracking/scanning    Physical Exam:  Balance:    -       Min(A) with increased challenge for dynamic sitting  -       Pt with R posterior and lateral lean; requiring min(A) and B UE support for static standing    Postural  "examination/scapula alignment:    -       Rounded shoulders  -       Forward head  -       Posterior pelvic tilt  Skin integrity: Visible skin intact  Edema:  None noted  Sensation:    -       Impaired  proprioception L UE  Motor Planning:    -       Impaired L UE  Dominant hand:    -       R  Upper Extremity Range of Motion:     -       Right Upper Extremity: WFL  -       Left Upper Extremity: WFL  Upper Extremity Strength:    -       Right Upper Extremity: WFL  -       Left Upper Extremity: 4+/5   Strength:    -       Right Upper Extremity: WFL  -       Left Upper Extremity: WFL  Fine Motor Coordination:    -       Intact  Right hand, finger to nose, Right hand thumb/finger opposition skills and Right hand, manipulation of objects  -       Impaired  Left hand, finger to nose dysmetria, Left hand thumb/finger opposition skills  , Left hand, manipulation of objects   and Left hand, diadochokinesis skill moderate delay  Gross motor coordination:   L ataxia hand-eye coordination; Impaired L heel to shin    AMPAC 6 Click ADL:  AMPAC Total Score: 18     Body mass index is 34.63 kg/m².    Vitals:    12/06/18 0750   BP:    Pulse: 87   Resp:    Temp:        Treatment & Education:  -Pt alert and agreeable to therapy eval; edu pt/spouse on OT role in care and POC for acute setting  -Edu on safety in acute setting and use of call light for assistance  -Pt requesting to call PCT for toileting hygiene 2/2 L UE "not working"-- OT to edu on need for use of non dominant side to assist but still using dominant UE with functional tasks  -Communication board updated; questions/concerns addressed within OT scope of practice    Education:    Patient left HOB elevated with all lines intact, call button in reach, bed alarm on, RN notified and spouse present   *discussed mobility level for stand pivot with nursing following    Assessment:     Missy Ennis is a 68 y.o. female with a medical diagnosis of Cerebellar stroke, acute. Pt " "demo L UE/LE ataxia with impaired postural control and impaired static and dynamic balance. She is a fall risk at this time. She demo increased fatigue with task with noted increased difficulty with following multi step commands and cues as time in session progressed. She demo impaired insight into current situation and impaired overall safety awareness. She would best benefit from Rehab prior to return home for best functional outcomes and best safety.      She presents with the following performance deficits affecting function: weakness, impaired endurance, impaired self care skills, impaired functional mobilty, gait instability, impaired balance, impaired cognition, decreased coordination, impaired coordination, impaired fine motor, decreased upper extremity function, decreased lower extremity function, impaired cardiopulmonary response to activity.      Rehab Prognosis: Good; patient would benefit from acute skilled OT services to address these deficits and reach maximum level of function.         Clinical Decision Makin.  OT Mod:  "Pt evaluation falls under moderate complexity for evaluation coding due to identification of 3-5 performance deficits noted as stated above. Eval required Min/Mod assistance to complete on this date and detailed assessment(s) were utilized. Moreover, an expanded review of history and occupational profile obtained with additional review of cognitive, physical and psychosocial hx."     Plan:     Patient to be seen 4 x/week to address the above listed problems via self-care/home management, therapeutic activities, therapeutic exercises, neuromuscular re-education, cognitive retraining  · Plan of Care Expires: 19  · Plan of Care Reviewed with: patient, spouse    This Plan of care has been discussed with the patient who was involved in its development and understands and is in agreement with the identified goals and treatment plan    GOALS:   Multidisciplinary Problems     " Occupational Therapy Goals        Problem: Occupational Therapy Goal    Goal Priority Disciplines Outcome Interventions   Occupational Therapy Goal     OT, PT/OT Ongoing (interventions implemented as appropriate)    Description:  Goals to be met by: 12/16     Patient will increase functional independence with ADLs by performing:    Bed mobility and supine to sit with CGA and HOB flat.  UE Dressing with Set-up Assistance and Supervision.  LE Dressing (pants, brief) with Set-up Assistance and Stand-by Assistance.  Grooming while standing at sink with Stand-by Assistance.  Toileting from toilet with Stand-by Assistance for hygiene and clothing management.   Toilet transfer to toilet with Stand-by Assistance.  Functional mobility at household distance for ADL task with CGA and AD as needed.  Pt/CG verbalized understanding for s/s of stroke.   Pt will follow multi step commands without added cues or time.   Pt will complete hand eye coordination task for L UE with 4/5 accuracy.                       Time Tracking:     OT Date of Treatment: 12/06/18  OT Start Time: 0906  OT Stop Time: 0932  OT Total Time (min): 26 min    Billable Minutes:Evaluation 18  Self Care/Home Management 8    RAYO Parker  12/6/2018

## 2018-12-06 NOTE — PROGRESS NOTES
[facility]  Neurosurgery  Progress Note    Subjective:     History of Present Illness: 67 y/o female with PMHx of HTN, DM and prior stroke who had presented with worsening dizziness and N/V found to have and acute infarct Left cerebellum.  HCT without hydrocephalus. She has also been have episodes of falling and not knowing why, she has seen neurologist to get checked out for seizure activity and was placed on Vimpat. Neurosurgery consulted for evaluation. Pt takes 81 aspirin at home.            Post-Op Info:  * No surgery found *         Interval History: pt states she is on ASA 81 at home 2/2 a previous stroke. Pt tolerating diet. Pt denies dizziness, imbalance, n/v, new weakness, vision disturbances.  at bedside and  called daughter and care was discussed with pt and family.    Medications:  Continuous Infusions:   sodium chloride 0.9%       Scheduled Meds:   aspirin  81 mg Oral Daily    atorvastatin  40 mg Oral Daily    carvedilol  6.25 mg Oral BID WM    gabapentin  100 mg Oral QID    heparin (porcine)  5,000 Units Subcutaneous Q8H    lacosamide  150 mg Oral Daily    levothyroxine  88 mcg Oral Daily    lisinopril  5 mg Oral Daily    multivit-iron-FA-calcium-mins  1 tablet Oral Daily    sodium chloride 0.9%  3 mL Intravenous Q8H     PRN Meds:acetaminophen, amitriptyline, dextrose 50%, dextrose 50%, glucagon (human recombinant), glucose, glucose, influenza, insulin aspart U-100, ondansetron, pneumoc 13-devora conj-dip cr(PF), polyethylene glycol, sodium chloride 0.9%     Review of Systems  Objective:     Weight: 90.1 kg (198 lb 10.2 oz)  Body mass index is 34.63 kg/m².  Vital Signs (Most Recent):  Temp: 98 °F (36.7 °C) (12/06/18 1526)  Pulse: 71 (12/06/18 1526)  Resp: 16 (12/06/18 1526)  BP: 137/83 (12/06/18 1526)  SpO2: 97 % (12/06/18 1526) Vital Signs (24h Range):  Temp:  [97.1 °F (36.2 °C)-98.9 °F (37.2 °C)] 98 °F (36.7 °C)  Pulse:  [71-90] 71  Resp:  [16-20] 16  SpO2:   [90 %-97 %] 97 %  BP: (115-151)/(63-95) 137/83     Date 12/06/18 0700 - 12/07/18 0659   Shift 4845-7888 3651-9686 6024-4310 24 Hour Total   INTAKE   P.O. 240   240   Shift Total(mL/kg) 240(2.7)   240(2.7)   OUTPUT   Shift Total(mL/kg)       Weight (kg) 90.1 90.1 90.1 90.1                        Neurosurgery Physical Exam  General: well developed, well nourished, no distress.   Head: normocephalic, atraumatic  Neurologic: Alert and oriented. Thought content appropriate.  GCS: Motor: 6/Verbal: 5/Eyes: 4 GCS Total: 15  Mental Status: Awake, Alert, Oriented x 4  Language: No aphasia  Speech: No dysarthria  Cranial nerves: face symmetric, tongue midline, CN II-XII grossly intact.   Eyes: pupils equal, round, reactive to light, EOMI.   Pulmonary: normal respirations, no signs of respiratory distress  Sensory: intact to light touch throughout    Motor Strength:Moves all extremities spontaneously with good tone.  Full strength upper and lower extremities. No abnormal movements seen.     Strength  Deltoids Triceps Biceps Wrist Extension Wrist Flexion Hand    Upper: R 5/5 5/5 5/5 5/5 5/5 5/5    L 5/5 5/5 5/5 5/5 5/5 5/5     Iliopsoas Quadriceps Knee  Flexion Tibialis  anterior Gastro- cnemius EHL   Lower: R 5/5 5/5 5/5 5/5 5/5 5/5    L 4/5 5/5 5/5 4/5 4+/5 5/5     Pronator Drift: no drift noted  Finger-to-nose: mild dysmetria on left side.   Henning: absent  Clonus: absent  Babinski: absent  Skin: Skin is warm, dry and intact.      Significant Labs:  Recent Labs   Lab 12/04/18 2040 12/05/18  0430 12/06/18  0545   GLU  --  118* 138*   NA  --  140 140   K  --  4.0 3.7   CL  --  104 105   CO2  --  27 27   BUN  --  13 16   CREATININE 0.7 0.8 0.8   CALCIUM  --  9.5 9.2   MG  --  2.3  --      Recent Labs   Lab 12/05/18  0430 12/06/18  0545   WBC 8.04 7.62   HGB 13.8 13.1   HCT 43.3 41.6    189     Recent Labs   Lab 12/05/18 0430   INR 0.9   APTT 22.9     Microbiology Results (last 7 days)     ** No results found for  the last 168 hours. **        All pertinent labs from the last 24 hours have been reviewed.    Significant Diagnostics:  No new imaging for review    Assessment/Plan:     * Cerebellar stroke, acute    69 yo F with acute left cerebellar stroke    - Pt is neurologically stable  - MRI Brain on 12/4 shows 4th ventricle is patent without hydrocephalus  - No neurosurgical interventions indicated at this time  - EEG pending for prior questional episodes  - ASA 81 and lipitor 40  - PT/OT rec's rehab  - Care coordination per primary team  - Will follow, please call with questions or change in neurologic status           Lizzeth Felipe PA-C  Neurosurgery  [facility]

## 2018-12-06 NOTE — HPI
69 y/o female with PMHx of HTN, DM and prior stroke who had presented with worsening dizziness and N/V found to have and acute infarct Left cerebellum.  HCT without hydrocephalus. She has also been have episodes of falling and not knowing why, she has seen neurologist to get checked out for seizure activity and was placed on Vimpat. Neurosurgery consulted for evaluation. Pt takes 81 aspirin at home.

## 2018-12-06 NOTE — ASSESSMENT & PLAN NOTE
Stroke risk factor  Home levothyroxine  TSH- 5.218, Free T4  - Once patient is discharged please follow up with PCP to discuss increasing levothyroxine. Elevation of TSH with normal T4 can be seen in subclinical hypothyroidism. Patient already take levothyroxine at home. Can follow up with PCP as outpatient

## 2018-12-06 NOTE — PLAN OF CARE
Covering SW presented to Pt's bedside where Pt. Was accompanied by spouse.  Therapy Rec Rehab, Pt. And spouse were provided with a list of rehab facilities near there home.  SW contact information was provided for [facility].  SW will continue to follow and assist.    Susana Aguilera LMSW

## 2018-12-06 NOTE — PROGRESS NOTES
[facility]  Vascular Neurology  Comprehensive Stroke Center  Progress Note    Assessment/Plan:     * Cerebellar stroke, acute    67 y/o female with left cerebellar stroke acute infarct in the left superficial cerebellar artery with small lacunar infarcts in the posterior cerebellum. There is also an area of  nonenhancing heterogenous signal intensity located in the right posterior occipital lobe and posterior parietal lobe. There is questionable seizure like activity in patient, thus patient is on a 24 hour video EEG monitoring    Antithrombotics: aspirin 81 mg daily    Statins: Lipitor 40 mg daily    Aggressive risk factor modification: HTN, DM     Rehab efforts: PT/OT/SLP to evaluate and treat- as per rehab patient to be discharged to rehab facility    Diagnostics ordered/pending: HgA1c 5.9, TSH 5.218, MRA of neck and brain pending    VTE prophylaxis: Heparin 5000 units SQ every 8 hours, SCD's    BP parameters: Infarct: No intervention, SBP <220         Diabetes type 2, controlled    Stroke risk factor  HgB A1C  SSI     Acquired hypothyroidism    Stroke risk factor  Home levothyroxine  TSH- 5.218, Free T4  - Once patient is discharged please follow up with PCP to discuss increasing levothyroxine. Elevation of TSH with normal T4 can be seen in subclinical hypothyroidism. Patient already take levothyroxine at home. Can follow up with PCP as outpatient     Hypertension, essential    Stroke risk factor  SBP <220          67 y/o F admitted to vascular neurology because of an acute infarct in the left superficial cerebellar artery. There is small lacunal infarct in the posterior cerebellum and nonspecific signal intensity located in the right posterior occipital lobe and posterior parietal lobe (mass versus infarct). As patient was having questionable prior seizure like activity in the past, she was also put on an 24 hour video EEG. As per NSGY patient does not need surgical intervention at this  point. PT/OT still awaiting final decision on disposition.       STROKE DOCUMENTATION        NIH Scale:  1a. Level Of Consciousness: 0-->Alert: keenly responsive  1b. LOC Questions: 0-->Answers both questions correctly  1c. LOC Commands: 0-->Performs both tasks correctly  2. Best Gaze: 0-->Normal  3. Visual: 0-->No visual loss  4. Facial Palsy: 0-->Normal symmetrical movements  5a. Motor Arm, Left: 0-->No drift: limb holds 90 (or 45) degrees for full 10 secs  5b. Motor Arm, Right: 0-->No drift: limb holds 90 (or 45) degrees for full 10 secs  6a. Motor Leg, Left: 0-->No drift: leg holds 30 degree position for full 5 secs  6b. Motor Leg, Right: 0-->No drift: leg holds 30 degree position for full 5 secs  7. Limb Ataxia: 0-->Absent  8. Sensory: 0-->Normal: no sensory loss  9. Best Language: 0-->No aphasia: normal  10. Dysarthria: 0-->Normal  11. Extinction and Inattention (formerly Neglect): 0-->No abnormality  Total (NIH Stroke Scale): 0       Modified McClain Score: 0  Troy Coma Scale:15   ABCD2 Score:    [id number] Score:   HAS -BLED Score:   ICH Score:   Hunt & Sun Classification:      Hemorrhagic change of an Ischemic Stroke: Does this patient have an ischemic stroke with hemorrhagic changes? No     Neurologic Chief Complaint: Patient presents with dizziness and nausea/vomiting found to have acute infarct in the left superficial cerebellar artery with small lacunar infarcts in the posterior cerebellum.     Subjective:     Interval History: Patient is seen for follow-up neurological assessment and treatment recommendations: left cerebellar stroke currently on DAPT and statin. Overnight patient was afebrile. BP ranging between 115//67. 24 hour EEG still being recorded and will be read today. PT/OT will make final decision on disposition. Most likely rehab facility.     HPI, Past Medical, Family, and Social History remains the same as documented in the initial encounter.     Review of Systems    Constitutional: Negative for activity change, fatigue and fever.   Cardiovascular: Negative for chest pain, palpitations and leg swelling.   Gastrointestinal: Negative for abdominal distention and abdominal pain.   Genitourinary: Negative for hematuria and urgency.   Neurological: Positive for dizziness and weakness. Negative for tremors, facial asymmetry, light-headedness and headaches.     Scheduled Meds:   aspirin  81 mg Oral Daily    atorvastatin  40 mg Oral Daily    carvedilol  6.25 mg Oral BID WM    gabapentin  100 mg Oral QID    heparin (porcine)  5,000 Units Subcutaneous Q8H    lacosamide  150 mg Oral Daily    levothyroxine  88 mcg Oral Daily    lisinopril  5 mg Oral Daily    multivit-iron-FA-calcium-mins  1 tablet Oral Daily    sodium chloride 0.9%  3 mL Intravenous Q8H     Continuous Infusions:   sodium chloride 0.9%       PRN Meds:acetaminophen, amitriptyline, dextrose 50%, dextrose 50%, glucagon (human recombinant), glucose, glucose, influenza, insulin aspart U-100, ondansetron, pneumoc 13-devora conj-dip cr(PF), polyethylene glycol, sodium chloride 0.9%    Objective:     Vital Signs (Most Recent):  Temp: 97.1 °F (36.2 °C) (12/06/18 0749)  Pulse: 87 (12/06/18 0750)  Resp: 18 (12/06/18 0433)  BP: (!) 150/67 (12/06/18 0749)  SpO2: (!) 92 % (12/06/18 0749)  BP Location: Right arm    Vital Signs Range (Last 24H):  Temp:  [97.1 °F (36.2 °C)-98.9 °F (37.2 °C)]   Pulse:  [74-90]   Resp:  [18-20]   BP: (115-151)/(63-74)   SpO2:  [90 %-95 %]   BP Location: Right arm    Physical Exam   Constitutional: She is oriented to person, place, and time. She appears well-developed and well-nourished.   HENT:   Head: Normocephalic and atraumatic.   Eyes: EOM are normal. Pupils are equal, round, and reactive to light.   Neck: Normal range of motion.   Cardiovascular: Normal rate.   Pulmonary/Chest: Effort normal.   Abdominal: Soft.   Musculoskeletal: Normal range of motion.   Neurological: She is alert and oriented  to person, place, and time.   Skin: Skin is warm and dry.   Nursing note and vitals reviewed.      Neurological Exam:   LOC: alert  Language: No aphasia  EOM (CN III, IV, VI): Full/intact  Pupils (CN II, III): PERRL  Reflexes: 2+ throughout  Motor: Arm left  Normal 5/5  Cebellar: Upper Extremity Appendicular Ataxia (Finger Nose Finger)  Left  Tone: Normal tone throughout    Laboratory:  CMP:   Recent Labs   Lab 12/06/18  0545   CALCIUM 9.2   ALBUMIN 3.3*   PROT 6.2      K 3.7   CO2 27      BUN 16   CREATININE 0.8   ALKPHOS 102   ALT 17   AST 22   BILITOT 0.7     BMP:   Recent Labs   Lab 12/06/18  0545      K 3.7      CO2 27   BUN 16   CREATININE 0.8   CALCIUM 9.2     CBC:   Recent Labs   Lab 12/06/18  0545   WBC 7.62   RBC 4.26   HGB 13.1   HCT 41.6      MCV 98   MCH 30.8   MCHC 31.5*       Diagnostic Results     Brain Imaging       Geographic area T2/FLAIR signal hyperintensity with diffusion restriction and low signal on ADC without enhancement likely corresponds to an acute infarct in the distribution of the left superficial cerebellar artery.  Also there are small remote lacunar-type infarcts posterior cerebellum bilaterally.    Additionally there is a nonspecific area of nonenhancing heterogenous signal intensity located in the right posterior occipital lobe and posterior parietal lobe.    In the right frontal lobe there is a nonenhancing predominantly T1 and T2 FLAIR hypointense area measuring 3.4 x 1.9 cm which demonstrates surrounding increased T2 FLAIR signal in the surrounding white matter without mass effect.  No midline shift. No hemorrhage seen.  No evidence of hydrocephalus.  No extra-axial fluid collections.  Visualized major vascular flow voids are preserved.  Bone marrow signal intensity appears normal.    Vessel Imaging   Pending    Cardiac Imaging   · The aortic root is mildly dilated, measuring 3.8 cm.  · Normal left ventricular systolic function. The estimated  ejection fraction is 65%  · Normal right ventricular systolic function.  · No wall motion abnormalities.  · Indeterminate left ventricular diastolic function.  · Normal central venous pressure (3 mm Hg).  · The estimated PA systolic pressure is 20.47 mm Hg  · Trivial pericardial effusion.      Ian Ramachandran MD  Comprehensive Stroke Center  Department of Vascular Neurology   [facility]

## 2018-12-06 NOTE — SUBJECTIVE & OBJECTIVE
Neurologic Chief Complaint: Patient presents with dizziness and nausea/vomiting found to have acute infarct in the left superficial cerebellar artery with small lacunar infarcts in the posterior cerebellum.     Subjective:     Interval History: Patient is seen for follow-up neurological assessment and treatment recommendations: left cerebellar stroke currently on DAPT and statin. Overnight patient was afebrile. BP ranging between 115//67. 24 hour EEG still being recorded and will be read today. PT/OT will make final decision on disposition. Most likely rehab facility.     HPI, Past Medical, Family, and Social History remains the same as documented in the initial encounter.     Review of Systems   Constitutional: Negative for activity change, fatigue and fever.   Cardiovascular: Negative for chest pain, palpitations and leg swelling.   Gastrointestinal: Negative for abdominal distention and abdominal pain.   Genitourinary: Negative for hematuria and urgency.   Neurological: Positive for dizziness and weakness. Negative for tremors, facial asymmetry, light-headedness and headaches.     Scheduled Meds:   aspirin  81 mg Oral Daily    atorvastatin  40 mg Oral Daily    carvedilol  6.25 mg Oral BID WM    gabapentin  100 mg Oral QID    heparin (porcine)  5,000 Units Subcutaneous Q8H    lacosamide  150 mg Oral Daily    levothyroxine  88 mcg Oral Daily    lisinopril  5 mg Oral Daily    multivit-iron-FA-calcium-mins  1 tablet Oral Daily    sodium chloride 0.9%  3 mL Intravenous Q8H     Continuous Infusions:   sodium chloride 0.9%       PRN Meds:acetaminophen, amitriptyline, dextrose 50%, dextrose 50%, glucagon (human recombinant), glucose, glucose, influenza, insulin aspart U-100, ondansetron, pneumoc 13-devora conj-dip cr(PF), polyethylene glycol, sodium chloride 0.9%    Objective:     Vital Signs (Most Recent):  Temp: 97.1 °F (36.2 °C) (12/06/18 0749)  Pulse: 87 (12/06/18 0750)  Resp: 18 (12/06/18 8223)  BP:  (!) 150/67 (12/06/18 0749)  SpO2: (!) 92 % (12/06/18 0749)  BP Location: Right arm    Vital Signs Range (Last 24H):  Temp:  [97.1 °F (36.2 °C)-98.9 °F (37.2 °C)]   Pulse:  [74-90]   Resp:  [18-20]   BP: (115-151)/(63-74)   SpO2:  [90 %-95 %]   BP Location: Right arm    Physical Exam   Constitutional: She is oriented to person, place, and time. She appears well-developed and well-nourished.   HENT:   Head: Normocephalic and atraumatic.   Eyes: EOM are normal. Pupils are equal, round, and reactive to light.   Neck: Normal range of motion.   Cardiovascular: Normal rate.   Pulmonary/Chest: Effort normal.   Abdominal: Soft.   Musculoskeletal: Normal range of motion.   Neurological: She is alert and oriented to person, place, and time.   Skin: Skin is warm and dry.   Nursing note and vitals reviewed.      Neurological Exam:   LOC: alert  Language: No aphasia  EOM (CN III, IV, VI): Full/intact  Pupils (CN II, III): PERRL  Reflexes: 2+ throughout  Motor: Arm left  Normal 5/5  Cebellar: Upper Extremity Appendicular Ataxia (Finger Nose Finger)  Left  Tone: Normal tone throughout    Laboratory:  CMP:   Recent Labs   Lab 12/06/18  0545   CALCIUM 9.2   ALBUMIN 3.3*   PROT 6.2      K 3.7   CO2 27      BUN 16   CREATININE 0.8   ALKPHOS 102   ALT 17   AST 22   BILITOT 0.7     BMP:   Recent Labs   Lab 12/06/18  0545      K 3.7      CO2 27   BUN 16   CREATININE 0.8   CALCIUM 9.2     CBC:   Recent Labs   Lab 12/06/18  0545   WBC 7.62   RBC 4.26   HGB 13.1   HCT 41.6      MCV 98   MCH 30.8   MCHC 31.5*       Diagnostic Results     Brain Imaging       Geographic area T2/FLAIR signal hyperintensity with diffusion restriction and low signal on ADC without enhancement likely corresponds to an acute infarct in the distribution of the left superficial cerebellar artery.  Also there are small remote lacunar-type infarcts posterior cerebellum bilaterally.    Additionally there is a nonspecific area of  nonenhancing heterogenous signal intensity located in the right posterior occipital lobe and posterior parietal lobe.    In the right frontal lobe there is a nonenhancing predominantly T1 and T2 FLAIR hypointense area measuring 3.4 x 1.9 cm which demonstrates surrounding increased T2 FLAIR signal in the surrounding white matter without mass effect.  No midline shift. No hemorrhage seen.  No evidence of hydrocephalus.  No extra-axial fluid collections.  Visualized major vascular flow voids are preserved.  Bone marrow signal intensity appears normal.    Vessel Imaging   Pending    Cardiac Imaging   · The aortic root is mildly dilated, measuring 3.8 cm.  · Normal left ventricular systolic function. The estimated ejection fraction is 65%  · Normal right ventricular systolic function.  · No wall motion abnormalities.  · Indeterminate left ventricular diastolic function.  · Normal central venous pressure (3 mm Hg).  · The estimated PA systolic pressure is 20.47 mm Hg  · Trivial pericardial effusion.

## 2018-12-07 ENCOUNTER — DOCUMENTATION ONLY (OUTPATIENT)
Dept: CARDIOLOGY | Facility: HOSPITAL | Age: 68
End: 2018-12-07

## 2018-12-07 LAB
ALBUMIN SERPL BCP-MCNC: 3.3 G/DL
ALP SERPL-CCNC: 95 U/L
ALT SERPL W/O P-5'-P-CCNC: 19 U/L
ANION GAP SERPL CALC-SCNC: 8 MMOL/L
AST SERPL-CCNC: 29 U/L
BASOPHILS # BLD AUTO: 0.06 K/UL
BASOPHILS NFR BLD: 1 %
BILIRUB SERPL-MCNC: 0.6 MG/DL
BUN SERPL-MCNC: 17 MG/DL
CALCIUM SERPL-MCNC: 9.1 MG/DL
CHLORIDE SERPL-SCNC: 108 MMOL/L
CO2 SERPL-SCNC: 24 MMOL/L
CREAT SERPL-MCNC: 0.7 MG/DL
DIFFERENTIAL METHOD: ABNORMAL
EOSINOPHIL # BLD AUTO: 0.8 K/UL
EOSINOPHIL NFR BLD: 12.4 %
ERYTHROCYTE [DISTWIDTH] IN BLOOD BY AUTOMATED COUNT: 13.1 %
EST. GFR  (AFRICAN AMERICAN): >60 ML/MIN/1.73 M^2
EST. GFR  (NON AFRICAN AMERICAN): >60 ML/MIN/1.73 M^2
GLUCOSE SERPL-MCNC: 138 MG/DL
HCT VFR BLD AUTO: 40.8 %
HGB BLD-MCNC: 13 G/DL
IMM GRANULOCYTES # BLD AUTO: 0.02 K/UL
IMM GRANULOCYTES NFR BLD AUTO: 0.3 %
LYMPHOCYTES # BLD AUTO: 1.3 K/UL
LYMPHOCYTES NFR BLD: 21.5 %
MCH RBC QN AUTO: 31.1 PG
MCHC RBC AUTO-ENTMCNC: 31.9 G/DL
MCV RBC AUTO: 98 FL
MONOCYTES # BLD AUTO: 0.6 K/UL
MONOCYTES NFR BLD: 9.4 %
NEUTROPHILS # BLD AUTO: 3.4 K/UL
NEUTROPHILS NFR BLD: 55.4 %
NRBC BLD-RTO: 0 /100 WBC
PLATELET # BLD AUTO: 144 K/UL
PMV BLD AUTO: 12.1 FL
POCT GLUCOSE: 165 MG/DL (ref 70–110)
POTASSIUM SERPL-SCNC: 4.2 MMOL/L
PROT SERPL-MCNC: 6.2 G/DL
RBC # BLD AUTO: 4.18 M/UL
SODIUM SERPL-SCNC: 140 MMOL/L
WBC # BLD AUTO: 6.06 K/UL

## 2018-12-07 PROCEDURE — 36415 COLL VENOUS BLD VENIPUNCTURE: CPT

## 2018-12-07 PROCEDURE — 97535 SELF CARE MNGMENT TRAINING: CPT

## 2018-12-07 PROCEDURE — 92526 ORAL FUNCTION THERAPY: CPT

## 2018-12-07 PROCEDURE — A4216 STERILE WATER/SALINE, 10 ML: HCPCS | Performed by: NURSE PRACTITIONER

## 2018-12-07 PROCEDURE — 25000003 PHARM REV CODE 250: Performed by: NURSE PRACTITIONER

## 2018-12-07 PROCEDURE — 99232 SBSQ HOSP IP/OBS MODERATE 35: CPT | Mod: ,,, | Performed by: NURSE PRACTITIONER

## 2018-12-07 PROCEDURE — 20600001 HC STEP DOWN PRIVATE ROOM

## 2018-12-07 PROCEDURE — 99232 SBSQ HOSP IP/OBS MODERATE 35: CPT | Mod: ,,, | Performed by: PHYSICIAN ASSISTANT

## 2018-12-07 PROCEDURE — 99233 SBSQ HOSP IP/OBS HIGH 50: CPT | Mod: ,,, | Performed by: PSYCHIATRY & NEUROLOGY

## 2018-12-07 PROCEDURE — 85025 COMPLETE CBC W/AUTO DIFF WBC: CPT

## 2018-12-07 PROCEDURE — 25000003 PHARM REV CODE 250: Performed by: STUDENT IN AN ORGANIZED HEALTH CARE EDUCATION/TRAINING PROGRAM

## 2018-12-07 PROCEDURE — 63600175 PHARM REV CODE 636 W HCPCS: Performed by: NURSE PRACTITIONER

## 2018-12-07 PROCEDURE — 80053 COMPREHEN METABOLIC PANEL: CPT

## 2018-12-07 PROCEDURE — 97530 THERAPEUTIC ACTIVITIES: CPT

## 2018-12-07 RX ORDER — DULOXETIN HYDROCHLORIDE 30 MG/1
60 CAPSULE, DELAYED RELEASE ORAL DAILY
Status: DISCONTINUED | OUTPATIENT
Start: 2018-12-07 | End: 2018-12-10 | Stop reason: HOSPADM

## 2018-12-07 RX ORDER — GABAPENTIN 300 MG/1
300 CAPSULE ORAL 2 TIMES DAILY
Status: DISCONTINUED | OUTPATIENT
Start: 2018-12-08 | End: 2018-12-10 | Stop reason: HOSPADM

## 2018-12-07 RX ORDER — LEVOTHYROXINE SODIUM 100 UG/1
100 TABLET ORAL
Status: DISCONTINUED | OUTPATIENT
Start: 2018-12-08 | End: 2018-12-10 | Stop reason: HOSPADM

## 2018-12-07 RX ORDER — MECLIZINE HYDROCHLORIDE 25 MG/1
25 TABLET ORAL 3 TIMES DAILY PRN
COMMUNITY
End: 2020-11-17

## 2018-12-07 RX ADMIN — ASPIRIN 81 MG: 81 TABLET, COATED ORAL at 09:12

## 2018-12-07 RX ADMIN — HEPARIN SODIUM 5000 UNITS: 5000 INJECTION, SOLUTION INTRAVENOUS; SUBCUTANEOUS at 01:12

## 2018-12-07 RX ADMIN — MULTIPLE VITAMINS W/ MINERALS TAB 1 TABLET: TAB at 09:12

## 2018-12-07 RX ADMIN — LEVOTHYROXINE SODIUM 88 MCG: 88 TABLET ORAL at 06:12

## 2018-12-07 RX ADMIN — AMITRIPTYLINE HYDROCHLORIDE 25 MG: 25 TABLET, FILM COATED ORAL at 09:12

## 2018-12-07 RX ADMIN — DULOXETINE 60 MG: 30 CAPSULE, DELAYED RELEASE ORAL at 01:12

## 2018-12-07 RX ADMIN — GABAPENTIN 100 MG: 100 CAPSULE ORAL at 09:12

## 2018-12-07 RX ADMIN — INSULIN ASPART 2 UNITS: 100 INJECTION, SOLUTION INTRAVENOUS; SUBCUTANEOUS at 11:12

## 2018-12-07 RX ADMIN — Medication 3 ML: at 06:12

## 2018-12-07 RX ADMIN — HEPARIN SODIUM 5000 UNITS: 5000 INJECTION, SOLUTION INTRAVENOUS; SUBCUTANEOUS at 06:12

## 2018-12-07 RX ADMIN — CARVEDILOL 6.25 MG: 6.25 TABLET, FILM COATED ORAL at 09:12

## 2018-12-07 RX ADMIN — HEPARIN SODIUM 5000 UNITS: 5000 INJECTION, SOLUTION INTRAVENOUS; SUBCUTANEOUS at 10:12

## 2018-12-07 RX ADMIN — INSULIN ASPART 2 UNITS: 100 INJECTION, SOLUTION INTRAVENOUS; SUBCUTANEOUS at 06:12

## 2018-12-07 RX ADMIN — LISINOPRIL 5 MG: 5 TABLET ORAL at 09:12

## 2018-12-07 RX ADMIN — LACOSAMIDE 150 MG: 50 TABLET, FILM COATED ORAL at 09:12

## 2018-12-07 RX ADMIN — ATORVASTATIN CALCIUM 40 MG: 20 TABLET, FILM COATED ORAL at 09:12

## 2018-12-07 RX ADMIN — Medication 3 ML: at 09:12

## 2018-12-07 RX ADMIN — CARVEDILOL 6.25 MG: 6.25 TABLET, FILM COATED ORAL at 06:12

## 2018-12-07 NOTE — PROGRESS NOTES
[facility]  Neurosurgery  Progress Note    Subjective:     History of Present Illness: 67 y/o female with PMHx of HTN, DM and prior stroke who had presented with worsening dizziness and N/V found to have and acute infarct Left cerebellum.  HCT without hydrocephalus. She has also been have episodes of falling and not knowing why, she has seen neurologist to get checked out for seizure activity and was placed on Vimpat. Neurosurgery consulted for evaluation. Pt takes 81 aspirin at home.            Post-Op Info:  * No surgery found *         Interval History: Pt resting comfortably in chair. Pt states it is her first time in 3 days to get oob. She felt a little dizzy and off balance when getting in chair; however, she is feeling better today.  and daughter at bedside. Pt reports no new complaints, n/v, vision changes, new weakness.     Medications:  Continuous Infusions:   sodium chloride 0.9%       Scheduled Meds:   aspirin  81 mg Oral Daily    atorvastatin  40 mg Oral Daily    carvedilol  6.25 mg Oral BID WM    gabapentin  100 mg Oral QID    heparin (porcine)  5,000 Units Subcutaneous Q8H    lacosamide  150 mg Oral Daily    levothyroxine  88 mcg Oral Daily    lisinopril  5 mg Oral Daily    multivit-iron-FA-calcium-mins  1 tablet Oral Daily    sodium chloride 0.9%  3 mL Intravenous Q8H     PRN Meds:acetaminophen, amitriptyline, dextrose 50%, dextrose 50%, glucagon (human recombinant), glucose, glucose, influenza, insulin aspart U-100, ondansetron, pneumoc 13-devora conj-dip cr(PF), polyethylene glycol, sodium chloride 0.9%     Review of Systems  Objective:     Weight: 90.1 kg (198 lb 10.2 oz)  Body mass index is 34.63 kg/m².  Vital Signs (Most Recent):  Temp: 97.8 °F (36.6 °C) (12/07/18 0831)  Pulse: 73 (12/07/18 1119)  Resp: 19 (12/07/18 0831)  BP: (!) 153/67 (12/07/18 0831)  SpO2: 96 % (12/07/18 0831) Vital Signs (24h Range):  Temp:  [97 °F (36.1 °C)-98.3 °F (36.8 °C)] 97.8 °F  (36.6 °C)  Pulse:  [67-97] 73  Resp:  [16-19] 19  SpO2:  [94 %-97 %] 96 %  BP: (115-153)/(58-95) 153/67                           Neurosurgery Physical Exam  General: well developed, well nourished, no distress.   Head: normocephalic, atraumatic  Neurologic: Alert and oriented. Thought content appropriate.  GCS: Motor: 6/Verbal: 5/Eyes: 4 GCS Total: 15  Mental Status: Awake, Alert, Oriented x 4  Language: No aphasia  Speech: No dysarthria  Cranial nerves: face symmetric, tongue midline, CN II-XII grossly intact.   Eyes: pupils equal, round, reactive to light, EOMI.  Pulmonary: normal respirations, no signs of respiratory distress  Sensory: intact to light touch throughout    Motor Strength: Moves all extremities spontaneously with good tone.  Full strength upper and lower extremities. No abnormal movements seen.     Strength  Deltoids Triceps Biceps Wrist Extension Wrist Flexion Hand    Upper: R 5/5 5/5 5/5 5/5 5/5 5/5    L 5/5 5/5 5/5 5/5 5/5 5/5     Iliopsoas Quadriceps Knee  Flexion Tibialis  anterior Gastro- cnemius EHL   Lower: R 5/5 5/5 5/5 5/5 5/5 5/5    L 4+/5 5/5 5/5 4+/5 4+/5 5/5     Pronator Drift: no drift noted  Finger-to-nose: mild dysmetria on left side - improving  Henning: absent  Clonus: absent  Babinski: absent  Pulses: 2+ and symmetric radial and dorsalis pedis.  Skin: Skin is warm, dry and intact.    Significant Labs:  Recent Labs   Lab 12/06/18  0545 12/07/18  0600   * 138*    140   K 3.7 4.2    108   CO2 27 24   BUN 16 17   CREATININE 0.8 0.7   CALCIUM 9.2 9.1     Recent Labs   Lab 12/06/18  0545 12/07/18  0600   WBC 7.62 6.06   HGB 13.1 13.0   HCT 41.6 40.8    144*     No results for input(s): LABPT, INR, APTT in the last 48 hours.  Microbiology Results (last 7 days)     ** No results found for the last 168 hours. **        All pertinent labs from the last 24 hours have been reviewed.    Significant Diagnostics:  EEG (12/6):  Essentially normal awake hospital  EEG.  The patient did not   tolerate recording overnight and electrodes were disconnected for a portion of   this recording, but recorded portions were normal.        Assessment/Plan:     * Cerebellar stroke, acute    69 yo F with acute left cerebellar stroke    - Pt is neurologically stable and symptoms are improving  - MRI Brain on 12/4 shows 4th ventricle is patent without hydrocephalus  - No neurosurgical interventions indicated at this time  - EEG 12/6 normal  - MRA pending  - ASA 81 and lipitor 40  - PT/OT rec's rehab  - Care coordination per primary team  - Will follow, please call with questions or change in neurologic status           Lizzeth Felipe PA-C  Neurosurgery  [facility]

## 2018-12-07 NOTE — PROGRESS NOTES
Arrhythmia clinic    Cardiac device interrogation and/or reprogramming completed by industry representative, Adilia Ma of [employer]; please refer to report located in the media tab.     Per her report there are 1321 labelled AT episodes. [place] 1/9% egrams c/w prob ST with occasional PVCs.

## 2018-12-07 NOTE — PLAN OF CARE
CM spoke with patient and family and they would like Rehab referrals sent to 1) [facility] and 2) [facility]. Sent in .     12/07/18 0900   Post-Acute Status   Post-Acute Authorization Placement   Post-Acute Placement Status Referrals Sent

## 2018-12-07 NOTE — PT/OT/SLP PROGRESS
"Physical Therapy Treatment    Patient Name:  Missy Ennis   MRN:  [id number]  Admitting Diagnosis: Cerebellar stroke, acute  Recent Surgery: * No surgery found *      Recommendations:     Discharge Recommendations:  rehabilitation facility   Discharge Equipment Recommendations: walker, rolling, bedside commode   Barriers to discharge: Decreased caregiver support    Plan:     During this hospitalization, patient to be seen 5 x/week to address the listed problems via gait training, therapeutic activities, therapeutic exercises, neuromuscular re-education  · Plan of Care Expires:  01/04/19   Plan of Care Reviewed with: patient, family    This Plan of care has been discussed with the patient who was involved in its development and understands and is in agreement with the identified goals and treatment plan    Subjective     Communicated with RN prior to session.  Patient found seated in bedside chair upon PT entry to room. Pt's  & daughter present during session.  "I'm better than yesterday"    Pain/Comfort:  · Pain Rating 1: 0/10  · Pain Rating Post-Intervention 1: 0/10    Objective:     Patient found with: telemetry   Mental Status: Patient is oriented to Person, Place, Time and Situation and follows 100% of verbal single-step commands. Patient is Alert and Cooperative during session.    General Precautions: Standard, Cardiac aspiration, fall   Orthopedic Precautions:N/A   Braces: N/A   Respiratory Status: room air  Vital Signs (Most Recent):    Temp: 97.8 °F (36.6 °C) (12/07/18 0831)  Pulse: 71 (12/07/18 1436)  Resp: 19 (12/07/18 0831)  BP: (!) 153/67 (12/07/18 0831)  SpO2: 97 % (12/07/18 1436)    Functional Mobility:  Bed Mobility:   · Pt found/retruned to bedside chair    Transfers:   · Sit <> Stand Transfer: minimum assistance with hand-held assist & CGA with RW  · Stand <> Sit Transfer: minimum assistance with rolling walker   · x1 from bedside chair with no AD, min A (pt with increased right lateral " lean)  ·  x1 from bedside chair with RW, CGA (pt with symmetrical rise from chair      Gait:  · Patient ambulated: 25ft with HHA, 25ft with RW   · Patient required: minimal assist and moderate assist  · Patient used:  Rolling Walker  · Gait Pattern observed: 3-point gait  · Gait Deviation(s): decreased ricco, increased time in double stance, decreased swing-to-stance ratio, decreased toe-to-floor clearance and decreased weight-shifting ability  · Impairments due to: decreased strength and cooridnation of LLE  · Comments: initial gait trial with HHA, pt required mod A for balance and appropriate step length of LLE. 2nd gait trial with RW, min A (pt with increased safety and step length). Pt with c/o dizziness and visual changes with ambulation. Pt reported dizziness stopped once seated.    Therapeutic Activities & Exercises:   Education:  Patient provided with daily orientation and goals of this PT session. Patient and family agreed to participate in session. Patient and family aware of patient's deficits and therapy progression. They were educated to transfer to bedside chair/bedside commode/bathroom with RN/PCT present. They were provided and educated on proper positioning in supine and in sitting with support of affected LUE extremities in order to increase awareness of extremity and to decrease the effects of immobility, specifically edema and pain. Encouraged patient to perform daily exercises & mobility to increase endurance and decrease effects of bedrest.Time provided for therapeutic counseling and discussion of health disposition. All questions answered to patient's and family's satisfaction, within scope of PT practice; voiced no other concerns. White board updated in patient's room, RN notified of session.    Patient left up in chair, with LUE propped on pillow for scapular support and edema management, head in midline, neutral pelvis & heels floated for skin protection with all lines intact, call button  in reach and RN notified.    AM-PAC 6 CLICK MOBILITY  Turning over in bed (including adjusting bedclothes, sheets and blankets)?: 3  Sitting down on and standing up from a chair with arms (e.g., wheelchair, bedside commode, etc.): 3  Moving from lying on back to sitting on the side of the bed?: 3  Moving to and from a bed to a chair (including a wheelchair)?: 3  Need to walk in hospital room?: 3  Climbing 3-5 steps with a railing?: 3  Basic Mobility Total Score: 18     Assessment:     Missy Ennis is a 68 y.o. female admitted with a medical diagnosis of Cerebellar stroke, acute.   Patient is making progress towards goals, participating well in this session. Pt continues to require significant assist with safe ambulation with use of RW. Pt limited by decreased coordination of LLE and dizziness with mobility. Missy Coboss deficits effect their ability to safely and independently participate in self-care tasks and functional mobility which increases their caregiver burden. Due to her physical therapy diagnosis of debility and deconditioning, they continue to benefit from acute PT services to address the following limitations: high fall risk, weakness, instability, the need for supervised instruction of exercise. Missy Coboss deficits effect their roles and responsibilities in which they were able to complete prior to admit. Education was provided to patient & family regarding importance of continued participation in therapy, patient's progress and discharge disposition. Pt would benefit from IP Rehab upon discharge to improve quality of life and focus on recovery of impairments.     Problem List: weakness, impaired functional mobilty, impaired endurance, gait instability, impaired balance, impaired self care skills, impaired sensation, decreased coordination. weakness, impaired functional mobilty, impaired endurance, gait instability, impaired balance, impaired self care skills, impaired sensation,  decreased coordination  Rehab Prognosis: good; patient would benefit from acute skilled PT services to address these deficits and reach maximum level of function.      GOALS:   Multidisciplinary Problems     Physical Therapy Goals        Problem: Physical Therapy Goal    Goal Priority Disciplines Outcome Goal Variances Interventions   Physical Therapy Goal     PT, PT/OT Ongoing (interventions implemented as appropriate)     Description:  Goals to be met by: 1 week ()     Patient will increase functional independence with mobility by performin. Supine to sit with supervision. Not met  2. Sit to supine with supervision. Not met  3. Sit to stand transfer with Stand-by Assistance. Not met  4. Bed to chair transfer with Contact Guard Assistance using Rolling Walker. Not met  5. Gait  x  75 feet with Contact guard assistance using Rolling Walker.  Not met  6. Ascend/descend 3 stairs with bilateral Handrails Contact Guard Assistance. Not met  7. Stand for 3 minutes with Contact Guard Assistance using Rolling Walker while performing a task. Not met  8. Lower extremity exercise program x 20 reps per handout, with independence. Not met                      Time Tracking:     PT Received On: 18  PT Start Time: 948     PT Stop Time: 1003  PT Total Time (min): 15 min     Billable Minutes:   · Therapeutic Activity 15    Treatment Type: Treatment  PT/PTA: PT       Karen Dale PT, DPT  2018  Pager: [phone]

## 2018-12-07 NOTE — SUBJECTIVE & OBJECTIVE
Interval History: Pt resting comfortably in chair. Pt states it is her first time in 3 days to get oob. She felt a little dizzy and off balance when getting in chair; however, she is feeling better today.  and daughter at bedside. Pt reports no new complaints, n/v, vision changes, new weakness.     Medications:  Continuous Infusions:   sodium chloride 0.9%       Scheduled Meds:   aspirin  81 mg Oral Daily    atorvastatin  40 mg Oral Daily    carvedilol  6.25 mg Oral BID WM    gabapentin  100 mg Oral QID    heparin (porcine)  5,000 Units Subcutaneous Q8H    lacosamide  150 mg Oral Daily    levothyroxine  88 mcg Oral Daily    lisinopril  5 mg Oral Daily    multivit-iron-FA-calcium-mins  1 tablet Oral Daily    sodium chloride 0.9%  3 mL Intravenous Q8H     PRN Meds:acetaminophen, amitriptyline, dextrose 50%, dextrose 50%, glucagon (human recombinant), glucose, glucose, influenza, insulin aspart U-100, ondansetron, pneumoc 13-devora conj-dip cr(PF), polyethylene glycol, sodium chloride 0.9%     Review of Systems  Objective:     Weight: 90.1 kg (198 lb 10.2 oz)  Body mass index is 34.63 kg/m².  Vital Signs (Most Recent):  Temp: 97.8 °F (36.6 °C) (12/07/18 0831)  Pulse: 73 (12/07/18 1119)  Resp: 19 (12/07/18 0831)  BP: (!) 153/67 (12/07/18 0831)  SpO2: 96 % (12/07/18 0831) Vital Signs (24h Range):  Temp:  [97 °F (36.1 °C)-98.3 °F (36.8 °C)] 97.8 °F (36.6 °C)  Pulse:  [67-97] 73  Resp:  [16-19] 19  SpO2:  [94 %-97 %] 96 %  BP: (115-153)/(58-95) 153/67                           Neurosurgery Physical Exam  General: well developed, well nourished, no distress.   Head: normocephalic, atraumatic  Neurologic: Alert and oriented. Thought content appropriate.  GCS: Motor: 6/Verbal: 5/Eyes: 4 GCS Total: 15  Mental Status: Awake, Alert, Oriented x 4  Language: No aphasia  Speech: No dysarthria  Cranial nerves: face symmetric, tongue midline, CN II-XII grossly intact.   Eyes: pupils equal, round, reactive to light,  EOMI.  Pulmonary: normal respirations, no signs of respiratory distress  Sensory: intact to light touch throughout    Motor Strength: Moves all extremities spontaneously with good tone.  Full strength upper and lower extremities. No abnormal movements seen.     Strength  Deltoids Triceps Biceps Wrist Extension Wrist Flexion Hand    Upper: R 5/5 5/5 5/5 5/5 5/5 5/5    L 5/5 5/5 5/5 5/5 5/5 5/5     Iliopsoas Quadriceps Knee  Flexion Tibialis  anterior Gastro- cnemius EHL   Lower: R 5/5 5/5 5/5 5/5 5/5 5/5    L 4+/5 5/5 5/5 4+/5 4+/5 5/5     Pronator Drift: no drift noted  Finger-to-nose: mild dysmetria on left side - improving  Henning: absent  Clonus: absent  Babinski: absent  Pulses: 2+ and symmetric radial and dorsalis pedis.  Skin: Skin is warm, dry and intact.    Significant Labs:  Recent Labs   Lab 12/06/18  0545 12/07/18  0600   * 138*    140   K 3.7 4.2    108   CO2 27 24   BUN 16 17   CREATININE 0.8 0.7   CALCIUM 9.2 9.1     Recent Labs   Lab 12/06/18  0545 12/07/18  0600   WBC 7.62 6.06   HGB 13.1 13.0   HCT 41.6 40.8    144*     No results for input(s): LABPT, INR, APTT in the last 48 hours.  Microbiology Results (last 7 days)     ** No results found for the last 168 hours. **        All pertinent labs from the last 24 hours have been reviewed.    Significant Diagnostics:  EEG (12/6):  Essentially normal awake hospital EEG.  The patient did not   tolerate recording overnight and electrodes were disconnected for a portion of   this recording, but recorded portions were normal.

## 2018-12-07 NOTE — PROGRESS NOTES
[facility]  Vascular Neurology  Comprehensive Stroke Center  Progress Note    Assessment/Plan:     * Cerebellar stroke, acute    69 y/o female with left cerebellar stroke acute infarct in the left superior cerebellar artery with small lacunar infarcts in the posterior cerebellum. There is also an area of  nonenhancing heterogenous signal intensity located in the right posterior occipital lobe and posterior parietal lobe. There is questionable seizure like activity in patient, thus patient has underwent a 24 hr EEG which shows no seizure like activity     Antithrombotics: aspirin 81 mg daily    Statins: Lipitor 40 mg daily    Aggressive risk factor modification: HTN, DM     Rehab efforts: PT/OT/SLP to evaluate and treat- as per rehab patient to be discharged to rehab facility. We are waiting for the insurance approval     Diagnostics ordered/pending: HgA1c 5.9, TSH 5.218, MRA of neck and brain pending    VTE prophylaxis: Heparin 5000 units SQ every 8 hours, SCD's    BP parameters: Infarct: No intervention, SBP <220         Diabetes type 2, controlled    Stroke risk factor  HgB A1C 5.9   SSI     Acquired hypothyroidism    Stroke risk factor  Home levothyroxine  TSH- 5.218, Free T4  - Once patient is discharged please follow up with PCP to discuss increasing levothyroxine. Elevation of TSH with normal T4 can be seen in subclinical hypothyroidism. Patient already takes levothyroxine at home. Can follow up with PCP as outpatient     Hypertension, essential    Stroke risk factor. Patient is on lisinopril 5 mg   SBP <220          69 y/o F admitted to vascular neurology because of an acute infarct in the left superficial cerebellar artery. There is small lacunal infarct in the posterior cerebellum and nonspecific signal intensity located in the right posterior occipital lobe and posterior parietal lobe (mass versus infarct). As patient was having questionable prior seizure like activity in the past, she  was also put on an 24 hour video EEG which did not show any seizure like activity. As per NSGY patient does not need surgical intervention at this point. PT/OT recommends disposition to inpatient rehab. We will interrogate loop recorder after she has had the stroke.      STROKE DOCUMENTATION        NIH Scale:  1a. Level Of Consciousness: 0-->Alert: keenly responsive  1b. LOC Questions: 0-->Answers both questions correctly  1c. LOC Commands: 0-->Performs both tasks correctly  2. Best Gaze: 0-->Normal  3. Visual: 0-->No visual loss  4. Facial Palsy: 0-->Normal symmetrical movements  5a. Motor Arm, Left: 0-->No drift: limb holds 90 (or 45) degrees for full 10 secs  5b. Motor Arm, Right: 0-->No drift: limb holds 90 (or 45) degrees for full 10 secs  6a. Motor Leg, Left: 0-->No drift: leg holds 30 degree position for full 5 secs  6b. Motor Leg, Right: 0-->No drift: leg holds 30 degree position for full 5 secs  7. Limb Ataxia: 0-->Absent  8. Sensory: 0-->Normal: no sensory loss  9. Best Language: 0-->No aphasia: normal  10. Dysarthria: 0-->Normal  11. Extinction and Inattention (formerly Neglect): 0-->No abnormality  Total (NIH Stroke Scale): 0       Modified [place] Score: 0  Thornton Coma Scale:15   ABCD2 Score:    [id number] Score:   HAS -BLED Score:   ICH Score:   Hunt & Sun Classification:      Hemorrhagic change of an Ischemic Stroke: Does this patient have an ischemic stroke with hemorrhagic changes? No     Neurologic Chief Complaint: Patient presents with dizziness and nausea/vomiting found to have acute infarct in the left superior cerebellar artery with small lacunar infarcts in the posterior cerebellum.     Subjective:     Interval History: Patient is seen for follow-up neurological assessment and treatment recommendations: left cerebellar stroke currently on DAPT and statin. Overnight patient was afebrile. BP ranging between 115//69. 24 hour EEG results showed no seizure like activity. PT/OT has stated  that the patient will benefit from inpatient rehab. Consult put in for monitoring cardiac events via her loop recorder from the time patient has had the stroke. Patient scheduled for MRA     HPI, Past Medical, Family, and Social History remains the same as documented in the initial encounter.     Review of Systems   Constitutional: Negative for activity change, fatigue and fever.   Cardiovascular: Negative for chest pain, palpitations and leg swelling.   Gastrointestinal: Negative for abdominal distention and abdominal pain.   Genitourinary: Negative for hematuria and urgency.   Neurological: Positive for dizziness and weakness. Negative for tremors, facial asymmetry, light-headedness and headaches.     Scheduled Meds:   aspirin  81 mg Oral Daily    atorvastatin  40 mg Oral Daily    carvedilol  6.25 mg Oral BID WM    DULoxetine  60 mg Oral Daily    [START ON 12/8/2018] gabapentin  300 mg Oral BID    heparin (porcine)  5,000 Units Subcutaneous Q8H    lacosamide  150 mg Oral Daily    [START ON 12/8/2018] levothyroxine  100 mcg Oral Before breakfast    lisinopril  5 mg Oral Daily    multivit-iron-FA-calcium-mins  1 tablet Oral Daily    sodium chloride 0.9%  3 mL Intravenous Q8H     Continuous Infusions:   sodium chloride 0.9%       PRN Meds:acetaminophen, amitriptyline, dextrose 50%, dextrose 50%, glucagon (human recombinant), glucose, glucose, influenza, insulin aspart U-100, ondansetron, pneumoc 13-devora conj-dip cr(PF), polyethylene glycol, sodium chloride 0.9%    Objective:     Vital Signs (Most Recent):  Temp: 97.8 °F (36.6 °C) (12/07/18 0831)  Pulse: 73 (12/07/18 1119)  Resp: 19 (12/07/18 0831)  BP: (!) 153/67 (12/07/18 0831)  SpO2: 96 % (12/07/18 0831)  BP Location: Right arm    Vital Signs Range (Last 24H):  Temp:  [97 °F (36.1 °C)-98.3 °F (36.8 °C)]   Pulse:  [67-97]   Resp:  [16-19]   BP: (115-153)/(58-85)   SpO2:  [94 %-97 %]   BP Location: Right arm    Physical Exam   Constitutional: She is  oriented to person, place, and time. She appears well-developed and well-nourished.   HENT:   Head: Normocephalic and atraumatic.   Eyes: EOM are normal. Pupils are equal, round, and reactive to light.   Neck: Normal range of motion.   Cardiovascular: Normal rate.   Pulmonary/Chest: Effort normal.   Abdominal: Soft.   Musculoskeletal: Normal range of motion.   Neurological: She is alert and oriented to person, place, and time.   Skin: Skin is warm and dry.   Nursing note and vitals reviewed.      Neurological Exam:   LOC: alert  Language: No aphasia  EOM (CN III, IV, VI): Full/intact  Pupils (CN II, III): PERRL  Reflexes: 2+ throughout  Motor: Arm right  Paresis: 3/5  Leg right Paresis: 3/5  Cebellar: Upper Extremity Appendicular Ataxia (Finger Nose Finger)  Left  Tone: Normal tone throughout    Laboratory:  CMP:   Recent Labs   Lab 12/07/18  0600   CALCIUM 9.1   ALBUMIN 3.3*   PROT 6.2      K 4.2   CO2 24      BUN 17   CREATININE 0.7   ALKPHOS 95   ALT 19   AST 29   BILITOT 0.6     BMP:   Recent Labs   Lab 12/07/18  0600      K 4.2      CO2 24   BUN 17   CREATININE 0.7   CALCIUM 9.1     CBC:   Recent Labs   Lab 12/07/18  0600   WBC 6.06   RBC 4.18   HGB 13.0   HCT 40.8   *   MCV 98   MCH 31.1*   MCHC 31.9*       Diagnostic Results     Brain Imaging       Geographic area T2/FLAIR signal hyperintensity with diffusion restriction and low signal on ADC without enhancement likely corresponds to an acute infarct in the distribution of the left superficial cerebellar artery.  Also there are small remote lacunar-type infarcts posterior cerebellum bilaterally.    Additionally there is a nonspecific area of nonenhancing heterogenous signal intensity located in the right posterior occipital lobe and posterior parietal lobe.    In the right frontal lobe there is a nonenhancing predominantly T1 and T2 FLAIR hypointense area measuring 3.4 x 1.9 cm which demonstrates surrounding increased T2 FLAIR  signal in the surrounding white matter without mass effect.  No midline shift. No hemorrhage seen.  No evidence of hydrocephalus.  No extra-axial fluid collections.  Visualized major vascular flow voids are preserved.  Bone marrow signal intensity appears normal.    Vessel Imaging   Pending    Cardiac Imaging   · The aortic root is mildly dilated, measuring 3.8 cm.  · Normal left ventricular systolic function. The estimated ejection fraction is 65%  · Normal right ventricular systolic function.  · No wall motion abnormalities.  · Indeterminate left ventricular diastolic function.  · Normal central venous pressure (3 mm Hg).  · The estimated PA systolic pressure is 20.47 mm Hg  · Trivial pericardial effusion.      Ian Ramachandran MD  Comprehensive Stroke Center  Department of Vascular Neurology   [facility]

## 2018-12-07 NOTE — PHARMACY MED REC
"  Admission Medication Reconciliation - Pharmacy Consult Note    The home medication history was taken by Jessica KenneydD.   Based on information gathered and subsequent review by the clinical pharmacist, the items below may need attention.     You may go to "Admission" then "Reconcile Home Medications" tabs to review and/or act upon these items.     Potentially problematic discrepancies with current MAR  o Patient IS taking the following which was not ordered upon admit  o Duloxetine 60 mg by mouth daily  o Meclizine 25 mg by mouth every 8 hours as needed for dizziness (last fill 6/19/18 #30 for 30 day supply)  o Patient is taking a drug DIFFERENTLY than how ordered upon admit  o Gabapentin 300 mg by mouth twice daily (ordered as 100 mg by mouth 4 times a day)  o Levothyroxine 100 mcg by mouth daily (ordered as 88 mcg by mouth daily    Please address this information as you see fit.  Feel free to contact us if you have any questions or require assistance.    Mary Zavala, PharmD.   EXT [id number]              .    .            "

## 2018-12-07 NOTE — PROGRESS NOTES
[facility]  Physical Medicine & Rehab  Progress Note    Patient Name: Missy Ennis  MRN: [id number]  Admission Date: 12/4/2018  Length of Stay: 3 days  Attending Physician: Althea Bustamante MD    Subjective:     Principal Problem:Cerebellar stroke, acute    Hospital Course:   12/5/18:  Evaluated by PT and SLP.  Found to have cognitive-linguistic impairment.  SLP recommendation: regular diet and thin liquids.  Bed mobility SBA-modA.  Sit to stand Chantel.  Ambulated 4 steps x 6 trials Chantel.    12/6/18:  Evaluated by OT.  Participated with SLP.  Bed mobility min-modA.  Sit to stand Chantel and transfers min-modA.  Ambulated 4 steps Chantel.  UBD Chantel and LBD modA.  Toileting Chantel.      Interval History 12/7/2018:  Patient is seen for follow-up rehab evaluation and recommendations: No acute events over night.  EEG completed.  Participating well with therapy.  Looking forward to rehab.     HPI, Past Medical, Family, and Social History remains the same as documented in the initial encounter.    Scheduled Medications:    aspirin  81 mg Oral Daily    atorvastatin  40 mg Oral Daily    carvedilol  6.25 mg Oral BID WM    gabapentin  100 mg Oral QID    heparin (porcine)  5,000 Units Subcutaneous Q8H    lacosamide  150 mg Oral Daily    levothyroxine  88 mcg Oral Daily    lisinopril  5 mg Oral Daily    multivit-iron-FA-calcium-mins  1 tablet Oral Daily    sodium chloride 0.9%  3 mL Intravenous Q8H     PRN Medications: acetaminophen, amitriptyline, dextrose 50%, dextrose 50%, glucagon (human recombinant), glucose, glucose, influenza, insulin aspart U-100, ondansetron, pneumoc 13-devora conj-dip cr(PF), polyethylene glycol, sodium chloride 0.9%    Review of Systems   Constitutional: Positive for activity change. Negative for chills, fatigue and fever.   HENT: Negative for drooling, hearing loss, trouble swallowing and voice change.    Eyes: Negative for pain and visual disturbance.   Respiratory: Negative for  cough, shortness of breath and wheezing.    Cardiovascular: Negative for chest pain and palpitations.   Gastrointestinal: Negative for abdominal distention, nausea and vomiting.   Genitourinary: Negative for difficulty urinating and flank pain.   Musculoskeletal: Positive for gait problem. Negative for arthralgias, back pain, myalgias and neck pain.   Skin: Negative for rash and wound.   Neurological: Positive for dizziness (with activity) and weakness. Negative for numbness and headaches.   Psychiatric/Behavioral: Negative for agitation and hallucinations. The patient is not nervous/anxious.      Objective:     Vital Signs (Most Recent):  Temp: 97.8 °F (36.6 °C) (18)  Pulse: 79 (18)  Resp: 19 (18)  BP: (!) 153/67 (18)  SpO2: 96 % (18)    Vital Signs (24h Range):  Temp:  [97 °F (36.1 °C)-98.3 °F (36.8 °C)] 97.8 °F (36.6 °C)  Pulse:  [67-97] 79  Resp:  [16-19] 19  SpO2:  [94 %-97 %] 96 %  BP: (115-153)/(58-95) 153/67     Physical Exam   Constitutional: She is oriented to person, place, and time. She appears well-developed and well-nourished. No distress.   HENT:   Head: Normocephalic and atraumatic.   Right Ear: External ear normal.   Left Ear: External ear normal.   Nose: Nose normal.   Eyes: Right eye exhibits no discharge. Left eye exhibits no discharge. No scleral icterus.   Neck: Normal range of motion.   Cardiovascular: Normal rate, regular rhythm and intact distal pulses.   Pulmonary/Chest: Effort normal. No respiratory distress. She has no wheezes.   Abdominal: Soft. She exhibits no distension. There is no tenderness.   Musculoskeletal: Normal range of motion. She exhibits no edema or tenderness.   Neurological: She is alert and oriented to person, place, and time. No sensory deficit. She exhibits normal muscle tone. Coordination abnormal.   -  Mental Status:  AAOx3.  Follows commands.  Answers correct age and .   -  Speech and language:  no aphasia or  dysarthria.    -  Facial movement (CN VII): symmetrical.   -  Coordination:  Finger to nose exam:  RUE normal, LUE dysmetria.  -  Motor:  L pronator drift. RUE: 5/5.  LUE: 4/5.  RLE: 5/5.  LLE: 4/5.  -  Tone:  Normal.   -  Sensory:  Intact to light touch and pin prick.   Skin: Skin is warm and dry. No rash noted.   Psychiatric: She has a normal mood and affect. Her behavior is normal. Thought content normal.   Vitals reviewed.    Diagnostic Results:   Labs: Reviewed  CT: Reviewed  MRI: Reviewed    Assessment/Plan:      * Cerebellar stroke, acute    -  Presented to [facility] for new left-sided headache, left facial droop, dizziness, and N/V  -  CTH concerning for left-sided brain lesions  -  MRI brain revealed acute L cerebellar infarct without hydrocephalus  See hospital course for functional, cognitive/speech/language, and nutrition/swallow status.      Recommendations  -  Encourage mobility, OOB in chair, and early ambulation as appropriate   -  PT/OT evaluate and treat  -  SLP speech and cognitive evaluate and treat  -  Monitor mood and sleep disturbances  -  Establish consistent sleep-wake cycle  -  Monitor for bowel and bladder dysfunction  -  Monitor for shoulder pain and subluxation  -  Monitor for spasticity  -  DVT prophylaxis  -  Monitor for and prevent skin breakdown and pressure ulcers  · Early mobility, repositioning/weight shifting every 20-30 minutes when sitting, turn patient every 2 hours, proper mattress/overlay and chair cushioning, pressure relief/heel protector boots      Diabetes type 2, controlled    -  Stroke risk factor     Acquired hypothyroidism    -  On levothyroxine      Hypertension, essential    -  Stroke risk factor  -  On lisinopril, carvedilol      Recommend Inpatient Rehab.  IRF per patient/family preference.  Patient/family prefer facility closer to home.  Will follow for additional rehab needs or change in post-acute recommendation.    Asia Triana, SHERRIE  Department  of Physical Medicine & Rehab   [facility]

## 2018-12-07 NOTE — PLAN OF CARE
"Problem: SLP Goal  Goal: SLP Goal  Goals due 12/12  1.  Tolerate regular diet with thin liquids with no s/s of aspiration  2.  Assess functional reading and writing skills  3.  Recall speech strategies with no cues  4.  Respond to mental manipulation tasks with 90% accuracy    Outcome: Ongoing (interventions implemented as appropriate)  No overt s/s aspiration with thin liquid trials at bedside though pt and family report h/o coughing with thin liquids and "tickle in her throat" for last 6 months.  Aspiration and reflux precautions reviewed.  SLP to follow to ensure diet tolerance.  Pt may benefit from modified barium swallow study to more objectively assess swallow function either while inpatient or as an outpatient. TEA Rodriguez, SELENA/SLP  12/7/2018          "

## 2018-12-07 NOTE — SUBJECTIVE & OBJECTIVE
Interval History 12/7/2018:  Patient is seen for follow-up rehab evaluation and recommendations: No acute events over night.  EEG completed.  Participating well with therapy.  Looking forward to rehab.     HPI, Past Medical, Family, and Social History remains the same as documented in the initial encounter.    Scheduled Medications:    aspirin  81 mg Oral Daily    atorvastatin  40 mg Oral Daily    carvedilol  6.25 mg Oral BID WM    gabapentin  100 mg Oral QID    heparin (porcine)  5,000 Units Subcutaneous Q8H    lacosamide  150 mg Oral Daily    levothyroxine  88 mcg Oral Daily    lisinopril  5 mg Oral Daily    multivit-iron-FA-calcium-mins  1 tablet Oral Daily    sodium chloride 0.9%  3 mL Intravenous Q8H     PRN Medications: acetaminophen, amitriptyline, dextrose 50%, dextrose 50%, glucagon (human recombinant), glucose, glucose, influenza, insulin aspart U-100, ondansetron, pneumoc 13-devora conj-dip cr(PF), polyethylene glycol, sodium chloride 0.9%    Review of Systems   Constitutional: Positive for activity change. Negative for chills, fatigue and fever.   HENT: Negative for drooling, hearing loss, trouble swallowing and voice change.    Eyes: Negative for pain and visual disturbance.   Respiratory: Negative for cough, shortness of breath and wheezing.    Cardiovascular: Negative for chest pain and palpitations.   Gastrointestinal: Negative for abdominal distention, nausea and vomiting.   Genitourinary: Negative for difficulty urinating and flank pain.   Musculoskeletal: Positive for gait problem. Negative for arthralgias, back pain, myalgias and neck pain.   Skin: Negative for rash and wound.   Neurological: Positive for dizziness (with activity) and weakness. Negative for numbness and headaches.   Psychiatric/Behavioral: Negative for agitation and hallucinations. The patient is not nervous/anxious.      Objective:     Vital Signs (Most Recent):  Temp: 97.8 °F (36.6 °C) (12/07/18 0831)  Pulse: 79  (18)  Resp: 19 (18)  BP: (!) 153/67 (18)  SpO2: 96 % (18)    Vital Signs (24h Range):  Temp:  [97 °F (36.1 °C)-98.3 °F (36.8 °C)] 97.8 °F (36.6 °C)  Pulse:  [67-97] 79  Resp:  [16-19] 19  SpO2:  [94 %-97 %] 96 %  BP: (115-153)/(58-95) 153/67     Physical Exam   Constitutional: She is oriented to person, place, and time. She appears well-developed and well-nourished. No distress.   HENT:   Head: Normocephalic and atraumatic.   Right Ear: External ear normal.   Left Ear: External ear normal.   Nose: Nose normal.   Eyes: Right eye exhibits no discharge. Left eye exhibits no discharge. No scleral icterus.   Neck: Normal range of motion.   Cardiovascular: Normal rate, regular rhythm and intact distal pulses.   Pulmonary/Chest: Effort normal. No respiratory distress. She has no wheezes.   Abdominal: Soft. She exhibits no distension. There is no tenderness.   Musculoskeletal: Normal range of motion. She exhibits no edema or tenderness.   Neurological: She is alert and oriented to person, place, and time. No sensory deficit. She exhibits normal muscle tone. Coordination abnormal.   -  Mental Status:  AAOx3.  Follows commands.  Answers correct age and .   -  Speech and language:  no aphasia or dysarthria.    -  Facial movement (CN VII): symmetrical.   -  Coordination:  Finger to nose exam:  RUE normal, LUE dysmetria.  -  Motor:  L pronator drift. RUE: 5/5.  LUE: 4/5.  RLE: 5/5.  LLE: 4/5.  -  Tone:  Normal.   -  Sensory:  Intact to light touch and pin prick.   Skin: Skin is warm and dry. No rash noted.   Psychiatric: She has a normal mood and affect. Her behavior is normal. Thought content normal.   Vitals reviewed.    Diagnostic Results:   Labs: Reviewed  CT: Reviewed  MRI: Reviewed      NEUROLOGICAL EXAMINATION:     MENTAL STATUS   Oriented to person, place, and time.

## 2018-12-07 NOTE — PT/OT/SLP PROGRESS
"Speech Language Pathology Treatment    Patient Name:  Missy Ennis   MRN:  [id number]  Admitting Diagnosis: Cerebellar stroke, acute    Recommendations:                 General Recommendations:  Dysphagia therapy, Speech/language therapy and Cognitive-linguistic therapy  Diet recommendations:  Regular, Liquid Diet Level: Thin   Aspiration Precautions: Strict aspiration precautions   General Precautions: Standard, aspiration, fall  Communication strategies:  none    Subjective     "She is choking on liquids."  "It's been about 6 months."       Pain/Comfort:  · Pain Rating 1: 0/10  · Pain Rating Post-Intervention 1: 0/10    Objective:     Has the patient been evaluated by SLP for swallowing?   Yes  Keep patient NPO? No   Current Respiratory Status: room air      Pt seen bedside with family present.  Daughter verbalizing concern re: coughing with thin liquids.  She reports pt takes large sips while reclining with intermittent coughing.  She uses lozenges t/o day 2/2 "tickle" in throat.  Pt with h/o reflux and anterior cevical fusion of C4/5, 5/6 and 6/7.  Coughing has been intermittent for the last 6 months.  Pt denies increase in symptoms since stroke.  Pt observed self feeding thin liquids via water bottle.  No overt s/s aspiration though pt upright, presenting small single sips.  Swallow precs, reflux precautions, s/s aspiration, possibility of mbss, f/u with GI and/or ENT if symptoms persist and SLP POC.  Pt and family verbalized understanding and agreement.        Assessment:     Missy Ennis is a 68 y.o. female with an SLP diagnosis of Cognitive-Linguistic Impairment.  She presents with reports of dysphagia.    Goals:   Multidisciplinary Problems     SLP Goals        Problem: SLP Goal    Goal Priority Disciplines Outcome   SLP Goal     SLP Ongoing (interventions implemented as appropriate)   Description:  Goals due 12/12  1.  Tolerate regular diet with thin liquids with no s/s of aspiration  2.  Assess " functional reading and writing skills  3.  Recall speech strategies with no cues  4.  Respond to mental manipulation tasks with 90% accuracy                     Plan:     · Patient to be seen:  4 x/week   · Plan of Care expires:  01/03/18  · Plan of Care reviewed with:  patient   · SLP Follow-Up:  Yes       Discharge recommendations:  rehabilitation facility   Barriers to Discharge:  Level of Skilled Assistance Needed      Time Tracking:     SLP Treatment Date:   12/07/18  Speech Start Time:  1139  Speech Stop Time:  1204     Speech Total Time (min):  25 min    Billable Minutes: Treatment Swallowing Dysfunction 15 and Seld Care/Home Management Training 10    TEA Rodriguez, CCC-SLP  12/07/2018

## 2018-12-07 NOTE — PT/OT/SLP PROGRESS
Occupational Therapy      Patient Name:  Missy Ennis   MRN:  [id number]    Patient SONNY for MRI upon attempt.   Will follow up at later and more appropriate time.     RAYO Parker  12/7/2018

## 2018-12-07 NOTE — PLAN OF CARE
Problem: Physical Therapy Goal  Goal: Physical Therapy Goal  Goals to be met by: 1 week ()     Patient will increase functional independence with mobility by performin. Supine to sit with supervision. Not met  2. Sit to supine with supervision. Not met  3. Sit to stand transfer with Stand-by Assistance. Not met  4. Bed to chair transfer with Contact Guard Assistance using Rolling Walker. Not met  5. Gait  x  75 feet with Contact guard assistance using Rolling Walker.  Not met  6. Ascend/descend 3 stairs with bilateral Handrails Contact Guard Assistance. Not met  7. Stand for 3 minutes with Contact Guard Assistance using Rolling Walker while performing a task. Not met  8. Lower extremity exercise program x 20 reps per handout, with independence. Not met     Outcome: Ongoing (interventions implemented as appropriate)    Pt would benefit from IP Rehab upon discharge.  Appropriate transfer level with nursing staff: Bed <> Chair:  Stand Pivot with Minimal Assistance with 1 person with RW.    Karen Dale PT, DPT  2018  Pager: [phone]

## 2018-12-07 NOTE — ASSESSMENT & PLAN NOTE
Stroke risk factor  Home levothyroxine  TSH- 5.218, Free T4  - Once patient is discharged please follow up with PCP to discuss increasing levothyroxine. Elevation of TSH with normal T4 can be seen in subclinical hypothyroidism. Patient already takes levothyroxine at home. Can follow up with PCP as outpatient

## 2018-12-07 NOTE — PLAN OF CARE
Referral sent to     1) [facility]   2) [place] [phone]    Korin is f/u.

## 2018-12-07 NOTE — ASSESSMENT & PLAN NOTE
67 yo F with acute left cerebellar stroke    - Pt is neurologically stable and symptoms are improving  - MRI Brain on 12/4 shows 4th ventricle is patent without hydrocephalus  - No neurosurgical interventions indicated at this time  - EEG 12/6 normal  - MRA pending  - ASA 81 and lipitor 40  - PT/OT rec's rehab  - Care coordination per primary team  - Will follow, please call with questions or change in neurologic status

## 2018-12-07 NOTE — PLAN OF CARE
Patient has been accepted to [facility]-they can accept patient on Monday if patient is medically ready for discharge.     12/07/18 1606   Post-Acute Status   Post-Acute Authorization Placement   Post-Acute Placement Status Authorization Obtained

## 2018-12-07 NOTE — SUBJECTIVE & OBJECTIVE
Neurologic Chief Complaint: Patient presents with dizziness and nausea/vomiting found to have acute infarct in the left superior cerebellar artery with small lacunar infarcts in the posterior cerebellum.     Subjective:     Interval History: Patient is seen for follow-up neurological assessment and treatment recommendations: left cerebellar stroke currently on DAPT and statin. Overnight patient was afebrile. BP ranging between 115//69. 24 hour EEG results showed no seizure like activity. PT/OT has stated that the patient will benefit from inpatient rehab. Consult put in for monitoring cardiac events via her loop recorder from the time patient has had the stroke. Patient scheduled for MRA     HPI, Past Medical, Family, and Social History remains the same as documented in the initial encounter.     Review of Systems   Constitutional: Negative for activity change, fatigue and fever.   Cardiovascular: Negative for chest pain, palpitations and leg swelling.   Gastrointestinal: Negative for abdominal distention and abdominal pain.   Genitourinary: Negative for hematuria and urgency.   Neurological: Positive for dizziness and weakness. Negative for tremors, facial asymmetry, light-headedness and headaches.     Scheduled Meds:   aspirin  81 mg Oral Daily    atorvastatin  40 mg Oral Daily    carvedilol  6.25 mg Oral BID WM    DULoxetine  60 mg Oral Daily    [START ON 12/8/2018] gabapentin  300 mg Oral BID    heparin (porcine)  5,000 Units Subcutaneous Q8H    lacosamide  150 mg Oral Daily    [START ON 12/8/2018] levothyroxine  100 mcg Oral Before breakfast    lisinopril  5 mg Oral Daily    multivit-iron-FA-calcium-mins  1 tablet Oral Daily    sodium chloride 0.9%  3 mL Intravenous Q8H     Continuous Infusions:   sodium chloride 0.9%       PRN Meds:acetaminophen, amitriptyline, dextrose 50%, dextrose 50%, glucagon (human recombinant), glucose, glucose, influenza, insulin aspart U-100, ondansetron, pneumoc  13-devora conj-dip cr(PF), polyethylene glycol, sodium chloride 0.9%    Objective:     Vital Signs (Most Recent):  Temp: 97.8 °F (36.6 °C) (12/07/18 0831)  Pulse: 73 (12/07/18 1119)  Resp: 19 (12/07/18 0831)  BP: (!) 153/67 (12/07/18 0831)  SpO2: 96 % (12/07/18 0831)  BP Location: Right arm    Vital Signs Range (Last 24H):  Temp:  [97 °F (36.1 °C)-98.3 °F (36.8 °C)]   Pulse:  [67-97]   Resp:  [16-19]   BP: (115-153)/(58-85)   SpO2:  [94 %-97 %]   BP Location: Right arm    Physical Exam   Constitutional: She is oriented to person, place, and time. She appears well-developed and well-nourished.   HENT:   Head: Normocephalic and atraumatic.   Eyes: EOM are normal. Pupils are equal, round, and reactive to light.   Neck: Normal range of motion.   Cardiovascular: Normal rate.   Pulmonary/Chest: Effort normal.   Abdominal: Soft.   Musculoskeletal: Normal range of motion.   Neurological: She is alert and oriented to person, place, and time.   Skin: Skin is warm and dry.   Nursing note and vitals reviewed.      Neurological Exam:   LOC: alert  Language: No aphasia  EOM (CN III, IV, VI): Full/intact  Pupils (CN II, III): PERRL  Reflexes: 2+ throughout  Motor: Arm right  Paresis: 3/5  Leg right Paresis: 3/5  Cebellar: Upper Extremity Appendicular Ataxia (Finger Nose Finger)  Left  Tone: Normal tone throughout    Laboratory:  CMP:   Recent Labs   Lab 12/07/18  0600   CALCIUM 9.1   ALBUMIN 3.3*   PROT 6.2      K 4.2   CO2 24      BUN 17   CREATININE 0.7   ALKPHOS 95   ALT 19   AST 29   BILITOT 0.6     BMP:   Recent Labs   Lab 12/07/18  0600      K 4.2      CO2 24   BUN 17   CREATININE 0.7   CALCIUM 9.1     CBC:   Recent Labs   Lab 12/07/18  0600   WBC 6.06   RBC 4.18   HGB 13.0   HCT 40.8   *   MCV 98   MCH 31.1*   MCHC 31.9*       Diagnostic Results     Brain Imaging       Geographic area T2/FLAIR signal hyperintensity with diffusion restriction and low signal on ADC without enhancement likely  corresponds to an acute infarct in the distribution of the left superficial cerebellar artery.  Also there are small remote lacunar-type infarcts posterior cerebellum bilaterally.    Additionally there is a nonspecific area of nonenhancing heterogenous signal intensity located in the right posterior occipital lobe and posterior parietal lobe.    In the right frontal lobe there is a nonenhancing predominantly T1 and T2 FLAIR hypointense area measuring 3.4 x 1.9 cm which demonstrates surrounding increased T2 FLAIR signal in the surrounding white matter without mass effect.  No midline shift. No hemorrhage seen.  No evidence of hydrocephalus.  No extra-axial fluid collections.  Visualized major vascular flow voids are preserved.  Bone marrow signal intensity appears normal.    Vessel Imaging   Pending    Cardiac Imaging   · The aortic root is mildly dilated, measuring 3.8 cm.  · Normal left ventricular systolic function. The estimated ejection fraction is 65%  · Normal right ventricular systolic function.  · No wall motion abnormalities.  · Indeterminate left ventricular diastolic function.  · Normal central venous pressure (3 mm Hg).  · The estimated PA systolic pressure is 20.47 mm Hg  · Trivial pericardial effusion.

## 2018-12-07 NOTE — ASSESSMENT & PLAN NOTE
69 y/o female with left cerebellar stroke acute infarct in the left superior cerebellar artery with small lacunar infarcts in the posterior cerebellum. There is also an area of  nonenhancing heterogenous signal intensity located in the right posterior occipital lobe and posterior parietal lobe. There is questionable seizure like activity in patient, thus patient has underwent a 24 hr EEG which shows no seizure like activity     Antithrombotics: aspirin 81 mg daily    Statins: Lipitor 40 mg daily    Aggressive risk factor modification: HTN, DM     Rehab efforts: PT/OT/SLP to evaluate and treat- as per rehab patient to be discharged to rehab facility. We are waiting for the insurance approval     Diagnostics ordered/pending: HgA1c 5.9, TSH 5.218, MRA of neck and brain pending    VTE prophylaxis: Heparin 5000 units SQ every 8 hours, SCD's    BP parameters: Infarct: No intervention, SBP <220

## 2018-12-08 LAB
ALBUMIN SERPL BCP-MCNC: 3.4 G/DL
ALP SERPL-CCNC: 99 U/L
ALT SERPL W/O P-5'-P-CCNC: 21 U/L
ANION GAP SERPL CALC-SCNC: 11 MMOL/L
AST SERPL-CCNC: 26 U/L
BASOPHILS # BLD AUTO: 0.06 K/UL
BASOPHILS NFR BLD: 0.8 %
BILIRUB SERPL-MCNC: 0.6 MG/DL
BUN SERPL-MCNC: 16 MG/DL
CALCIUM SERPL-MCNC: 9.4 MG/DL
CHLORIDE SERPL-SCNC: 104 MMOL/L
CO2 SERPL-SCNC: 26 MMOL/L
CREAT SERPL-MCNC: 0.8 MG/DL
DIFFERENTIAL METHOD: ABNORMAL
EOSINOPHIL # BLD AUTO: 0.7 K/UL
EOSINOPHIL NFR BLD: 8.2 %
ERYTHROCYTE [DISTWIDTH] IN BLOOD BY AUTOMATED COUNT: 13.1 %
EST. GFR  (AFRICAN AMERICAN): >60 ML/MIN/1.73 M^2
EST. GFR  (NON AFRICAN AMERICAN): >60 ML/MIN/1.73 M^2
GLUCOSE SERPL-MCNC: 129 MG/DL
HCT VFR BLD AUTO: 39.4 %
HGB BLD-MCNC: 12.9 G/DL
IMM GRANULOCYTES # BLD AUTO: 0.02 K/UL
IMM GRANULOCYTES NFR BLD AUTO: 0.3 %
LYMPHOCYTES # BLD AUTO: 1.6 K/UL
LYMPHOCYTES NFR BLD: 19.6 %
MCH RBC QN AUTO: 31.9 PG
MCHC RBC AUTO-ENTMCNC: 32.7 G/DL
MCV RBC AUTO: 97 FL
MONOCYTES # BLD AUTO: 0.7 K/UL
MONOCYTES NFR BLD: 8.6 %
NEUTROPHILS # BLD AUTO: 5 K/UL
NEUTROPHILS NFR BLD: 62.5 %
NRBC BLD-RTO: 0 /100 WBC
PHOSPHATE SERPL-MCNC: 3.3 MG/DL
PLATELET # BLD AUTO: 186 K/UL
PMV BLD AUTO: 11.9 FL
POCT GLUCOSE: 144 MG/DL (ref 70–110)
POCT GLUCOSE: 156 MG/DL (ref 70–110)
POCT GLUCOSE: 188 MG/DL (ref 70–110)
POCT GLUCOSE: 188 MG/DL (ref 70–110)
POTASSIUM SERPL-SCNC: 3.9 MMOL/L
PROT SERPL-MCNC: 6.2 G/DL
RBC # BLD AUTO: 4.05 M/UL
SODIUM SERPL-SCNC: 141 MMOL/L
WBC # BLD AUTO: 7.94 K/UL

## 2018-12-08 PROCEDURE — G8979 MOBILITY GOAL STATUS: HCPCS | Mod: CJ

## 2018-12-08 PROCEDURE — 36415 COLL VENOUS BLD VENIPUNCTURE: CPT

## 2018-12-08 PROCEDURE — 97116 GAIT TRAINING THERAPY: CPT

## 2018-12-08 PROCEDURE — 25000003 PHARM REV CODE 250: Performed by: PSYCHIATRY & NEUROLOGY

## 2018-12-08 PROCEDURE — 25000003 PHARM REV CODE 250: Performed by: STUDENT IN AN ORGANIZED HEALTH CARE EDUCATION/TRAINING PROGRAM

## 2018-12-08 PROCEDURE — 80053 COMPREHEN METABOLIC PANEL: CPT

## 2018-12-08 PROCEDURE — 99233 SBSQ HOSP IP/OBS HIGH 50: CPT | Mod: GC,,, | Performed by: PSYCHIATRY & NEUROLOGY

## 2018-12-08 PROCEDURE — A4216 STERILE WATER/SALINE, 10 ML: HCPCS | Performed by: NURSE PRACTITIONER

## 2018-12-08 PROCEDURE — G8980 MOBILITY D/C STATUS: HCPCS | Mod: CJ

## 2018-12-08 PROCEDURE — 25000003 PHARM REV CODE 250: Performed by: NURSE PRACTITIONER

## 2018-12-08 PROCEDURE — 63600175 PHARM REV CODE 636 W HCPCS: Performed by: NURSE PRACTITIONER

## 2018-12-08 PROCEDURE — 99233 SBSQ HOSP IP/OBS HIGH 50: CPT | Mod: ,,, | Performed by: PHYSICIAN ASSISTANT

## 2018-12-08 PROCEDURE — 85025 COMPLETE CBC W/AUTO DIFF WBC: CPT

## 2018-12-08 PROCEDURE — 84100 ASSAY OF PHOSPHORUS: CPT

## 2018-12-08 PROCEDURE — 20600001 HC STEP DOWN PRIVATE ROOM

## 2018-12-08 RX ORDER — CLOPIDOGREL BISULFATE 75 MG/1
75 TABLET ORAL DAILY
Status: DISCONTINUED | OUTPATIENT
Start: 2018-12-09 | End: 2018-12-10 | Stop reason: HOSPADM

## 2018-12-08 RX ORDER — CLOPIDOGREL BISULFATE 75 MG/1
75 TABLET ORAL DAILY
Status: DISCONTINUED | OUTPATIENT
Start: 2018-12-08 | End: 2018-12-08

## 2018-12-08 RX ORDER — DIPHENHYDRAMINE HCL 25 MG
25 CAPSULE ORAL EVERY 6 HOURS PRN
Status: DISCONTINUED | OUTPATIENT
Start: 2018-12-08 | End: 2018-12-10 | Stop reason: HOSPADM

## 2018-12-08 RX ADMIN — CARVEDILOL 6.25 MG: 6.25 TABLET, FILM COATED ORAL at 08:12

## 2018-12-08 RX ADMIN — INSULIN ASPART 1 UNITS: 100 INJECTION, SOLUTION INTRAVENOUS; SUBCUTANEOUS at 09:12

## 2018-12-08 RX ADMIN — MULTIPLE VITAMINS W/ MINERALS TAB 1 TABLET: TAB at 08:12

## 2018-12-08 RX ADMIN — Medication 3 ML: at 09:12

## 2018-12-08 RX ADMIN — ATORVASTATIN CALCIUM 40 MG: 20 TABLET, FILM COATED ORAL at 07:12

## 2018-12-08 RX ADMIN — GABAPENTIN 300 MG: 300 CAPSULE ORAL at 08:12

## 2018-12-08 RX ADMIN — HEPARIN SODIUM 5000 UNITS: 5000 INJECTION, SOLUTION INTRAVENOUS; SUBCUTANEOUS at 09:12

## 2018-12-08 RX ADMIN — ASPIRIN 81 MG: 81 TABLET, COATED ORAL at 07:12

## 2018-12-08 RX ADMIN — LACOSAMIDE 150 MG: 50 TABLET, FILM COATED ORAL at 08:12

## 2018-12-08 RX ADMIN — DULOXETINE 60 MG: 30 CAPSULE, DELAYED RELEASE ORAL at 08:12

## 2018-12-08 RX ADMIN — Medication 3 ML: at 06:12

## 2018-12-08 RX ADMIN — LEVOTHYROXINE SODIUM 100 MCG: 100 TABLET ORAL at 06:12

## 2018-12-08 RX ADMIN — GABAPENTIN 300 MG: 300 CAPSULE ORAL at 09:12

## 2018-12-08 RX ADMIN — CARVEDILOL 6.25 MG: 6.25 TABLET, FILM COATED ORAL at 03:12

## 2018-12-08 RX ADMIN — AMITRIPTYLINE HYDROCHLORIDE 25 MG: 25 TABLET, FILM COATED ORAL at 09:12

## 2018-12-08 RX ADMIN — LISINOPRIL 5 MG: 5 TABLET ORAL at 07:12

## 2018-12-08 RX ADMIN — CLOPIDOGREL 75 MG: 75 TABLET, FILM COATED ORAL at 03:12

## 2018-12-08 RX ADMIN — HEPARIN SODIUM 5000 UNITS: 5000 INJECTION, SOLUTION INTRAVENOUS; SUBCUTANEOUS at 03:12

## 2018-12-08 RX ADMIN — Medication 3 ML: at 02:12

## 2018-12-08 RX ADMIN — HEPARIN SODIUM 5000 UNITS: 5000 INJECTION, SOLUTION INTRAVENOUS; SUBCUTANEOUS at 06:12

## 2018-12-08 RX ADMIN — DIPHENHYDRAMINE HYDROCHLORIDE 25 MG: 25 CAPSULE ORAL at 09:12

## 2018-12-08 NOTE — ASSESSMENT & PLAN NOTE
69 y/o female with left cerebellar stroke acute infarct in the left superior cerebellar artery with small lacunar infarcts in the posterior cerebellum. There is also an area of  nonenhancing heterogenous signal intensity located in the right posterior occipital lobe and posterior parietal lobe. There is questionable seizure like activity in patient, thus patient has underwent a 24 hr EEG which shows no seizure like activity     Antithrombotics: aspirin 81 mg daily,clopidogrel 75 mg    Statins: Lipitor 40 mg daily    Aggressive risk factor modification: HTN, DM     Rehab efforts: PT/OT/SLP to evaluate and treat- as per rehab patient to be discharged to rehab facility. We are waiting for the insurance approval     Diagnostics ordered/pending: MRA of neck and brain, TTE ordered and reported    VTE prophylaxis: Heparin 5000 units SQ every 8 hours, SCD's    BP parameters: Infarct: No intervention, SBP <220

## 2018-12-08 NOTE — ASSESSMENT & PLAN NOTE
67 yo F with acute left cerebellar stroke    - Pt is neurologically stable and symptoms are improving  - MRI Brain on 12/4 shows 4th ventricle is patent without hydrocephalus  - No neurosurgical interventions indicated at this time  - EEG 12/6 normal  - MRA Brain & Neck 12/7 with abrupt termination of the distal left superior cerebellar artery and remainder of vasculature without high-grade stenosis or vessel occlusion.   - ASA 81 and lipitor 40  - PT/OT rec's rehab  - Care coordination per primary team  - Will follow, please call with questions or change in neurologic status

## 2018-12-08 NOTE — PROGRESS NOTES
[facility]  Vascular Neurology  Comprehensive Stroke Center  Progress Note    Assessment/Plan:          67 y/o F admitted to vascular neurology because of an acute infarct in the left superficial cerebellar artery. There is small lacunal infarct in the posterior cerebellum and nonspecific signal intensity located in the right posterior occipital lobe and posterior parietal lobe (mass versus infarct). As patient was having questionable prior seizure like activity in the past, she was also put on an 24 hour video EEG which did not show any seizure like activity. As per NSGY patient does not need surgical intervention at this point. PT/OT recommends disposition to inpatient rehab. We will interrogate loop recorder after she has had the stroke.      STROKE DOCUMENTATION        NIH Scale:  1a. Level Of Consciousness: 0-->Alert: keenly responsive  1b. LOC Questions: 0-->Answers both questions correctly  1c. LOC Commands: 0-->Performs both tasks correctly  2. Best Gaze: 0-->Normal  3. Visual: 0-->No visual loss  4. Facial Palsy: 0-->Normal symmetrical movements  5a. Motor Arm, Left: 0-->No drift: limb holds 90 (or 45) degrees for full 10 secs  5b. Motor Arm, Right: 0-->No drift: limb holds 90 (or 45) degrees for full 10 secs  6a. Motor Leg, Left: 0-->No drift: leg holds 30 degree position for full 5 secs  6b. Motor Leg, Right: 0-->No drift: leg holds 30 degree position for full 5 secs  7. Limb Ataxia: 0-->Absent  8. Sensory: 0-->Normal: no sensory loss  9. Best Language: 0-->No aphasia: normal  10. Dysarthria: 0-->Normal  11. Extinction and Inattention (formerly Neglect): 0-->No abnormality  Total (NIH Stroke Scale): 0       Modified Atlantic Score: 0  [place] Coma Scale:15   ABCD2 Score:    [id number] Score:   HAS -BLED Score:   ICH Score:   Hunt & Sun Classification:      Hemorrhagic change of an Ischemic Stroke: Does this patient have an ischemic stroke with hemorrhagic changes? No     Neurologic  Chief Complaint: Patient presents with dizziness and nausea/vomiting found to have acute infarct in the left superior cerebellar artery with small lacunar infarcts in the posterior cerebellum.     Subjective:     Interval History: Patient is seen for follow-up neurological assessment and treatment recommendations: left cerebellar stroke currently on DAPT and statin. Overnight patient was afebrile. BP ranging between 115//69. 24 hour EEG results showed no seizure like activity. PT/OT has stated that the patient will benefit from inpatient rehab which she is scheduled to go Monday.    Loop recorder did not show any events of cardiac events since the patient had the stroke. MRA negative for stenosis or large vessel occlusion. NSGY has stated patient can be restarted on plavix. On examination LUE and LLE strength has improved.       HPI, Past Medical, Family, and Social History remains the same as documented in the initial encounter.     Review of Systems   Constitutional: Negative for activity change, fatigue and fever.   Cardiovascular: Negative for chest pain, palpitations and leg swelling.   Gastrointestinal: Negative for abdominal distention and abdominal pain.   Genitourinary: Negative for hematuria and urgency.   Neurological: Positive for dizziness and weakness. Negative for tremors, facial asymmetry, light-headedness and headaches.     Scheduled Meds:   aspirin  81 mg Oral Daily    atorvastatin  40 mg Oral Daily    carvedilol  6.25 mg Oral BID WM    DULoxetine  60 mg Oral Daily    gabapentin  300 mg Oral BID    heparin (porcine)  5,000 Units Subcutaneous Q8H    lacosamide  150 mg Oral Daily    levothyroxine  100 mcg Oral Before breakfast    lisinopril  5 mg Oral Daily    multivit-iron-FA-calcium-mins  1 tablet Oral Daily    sodium chloride 0.9%  3 mL Intravenous Q8H     Continuous Infusions:   sodium chloride 0.9%       PRN Meds:acetaminophen, amitriptyline, dextrose 50%, dextrose 50%,  glucagon (human recombinant), glucose, glucose, influenza, insulin aspart U-100, ondansetron, pneumoc 13-devora conj-dip cr(PF), polyethylene glycol, sodium chloride 0.9%    Objective:     Vital Signs (Most Recent):  Temp: 98.4 °F (36.9 °C) (12/08/18 1539)  Pulse: 77 (12/08/18 1539)  Resp: 16 (12/08/18 1539)  BP: (!) 164/76 (12/08/18 1539)  SpO2: 96 % (12/08/18 1539)  BP Location: Left arm    Vital Signs Range (Last 24H):  Temp:  [96.7 °F (35.9 °C)-98.4 °F (36.9 °C)]   Pulse:  [64-85]   Resp:  [16-18]   BP: (110-164)/(69-78)   SpO2:  [95 %-98 %]   BP Location: Left arm    Physical Exam   Constitutional: She is oriented to person, place, and time. She appears well-developed and well-nourished.   HENT:   Head: Normocephalic and atraumatic.   Eyes: EOM are normal. Pupils are equal, round, and reactive to light.   Neck: Normal range of motion.   Cardiovascular: Normal rate.   Pulmonary/Chest: Effort normal.   Abdominal: Soft.   Musculoskeletal: Normal range of motion.   Neurological: She is alert and oriented to person, place, and time.   Skin: Skin is warm and dry.   Nursing note and vitals reviewed.      Neurological Exam:   LOC: alert  Language: No aphasia  EOM (CN III, IV, VI): Full/intact  Pupils (CN II, III): PERRL  Reflexes: 2+ throughout  Motor: Arm right  Paresis: 4/5  Leg right Paresis: 4/5  Cebellar: Upper Extremity Appendicular Ataxia (Finger Nose Finger)  Left  Tone: Normal tone throughout    Laboratory:  CMP:   Recent Labs   Lab 12/08/18  0321   CALCIUM 9.4   ALBUMIN 3.4*   PROT 6.2      K 3.9   CO2 26      BUN 16   CREATININE 0.8   ALKPHOS 99   ALT 21   AST 26   BILITOT 0.6     BMP:   Recent Labs   Lab 12/08/18  0321      K 3.9      CO2 26   BUN 16   CREATININE 0.8   CALCIUM 9.4     CBC:   Recent Labs   Lab 12/08/18  0321   WBC 7.94   RBC 4.05   HGB 12.9   HCT 39.4      MCV 97   MCH 31.9*   MCHC 32.7       Diagnostic Results     Brain Imaging       Geographic area T2/FLAIR  signal hyperintensity with diffusion restriction and low signal on ADC without enhancement likely corresponds to an acute infarct in the distribution of the left superficial cerebellar artery.  Also there are small remote lacunar-type infarcts posterior cerebellum bilaterally.    Additionally there is a nonspecific area of nonenhancing heterogenous signal intensity located in the right posterior occipital lobe and posterior parietal lobe.    In the right frontal lobe there is a nonenhancing predominantly T1 and T2 FLAIR hypointense area measuring 3.4 x 1.9 cm which demonstrates surrounding increased T2 FLAIR signal in the surrounding white matter without mass effect.  No midline shift. No hemorrhage seen.  No evidence of hydrocephalus.  No extra-axial fluid collections.  Visualized major vascular flow voids are preserved.  Bone marrow signal intensity appears normal.    Vessel Imaging   MRA Neck and Brain  -Abrupt termination of the distal left superior cerebellar artery.  -The remainder of the cervical and intracranial vasculature appears within normal limits, allowing for motion.  No high-grade stenosis or large vessel occlusion identified elsewhere.  Cardiac Imaging   · The aortic root is mildly dilated, measuring 3.8 cm.  · Normal left ventricular systolic function. The estimated ejection fraction is 65%  · Normal right ventricular systolic function.  · No wall motion abnormalities.  · Indeterminate left ventricular diastolic function.  · Normal central venous pressure (3 mm Hg).  · The estimated PA systolic pressure is 20.47 mm Hg  · Trivial pericardial effusion.      Ian Ramachandran MD  Comprehensive Stroke Center  Department of Vascular Neurology   [facility]

## 2018-12-08 NOTE — PT/OT/SLP PROGRESS
"Physical Therapy Treatment    Patient Name: Missy Ennis  MRN: [id number]   Diagnosis: Cerebellar stroke, acute    Recommendations:     Discharge Recommendations:  rehabilitation facility   Discharge Equipment Recommendations:   TBD  Barriers to Discharge: fall risk, decreased caregiver assistance    Plan:   During this hospitalization, patient to be seen 5 x/week to address their physical therapy related impairments and improve their overall level of function.   · Plan of Care Expires: 01/04/19   Plan of Care Reviewed with: patient, spouse    This plan of care has been discussed with the patient and/or family who were involved in its development and are in agreement with the identified goals and treatment plan.     Subjective   Rn not available prior to therapy.   Patient comments/goals: "I'm ready to get up. I can't wait to take a shower."   Pain/Comfort:  · Pain Rating 1: 0/10  · Pain Rating Post-Intervention 1: 0/10    Recent Vital Signs: (Last documentation)  Pulse: 71 (12/08/18 1133)  BP: 132/69 (12/08/18 1133)  SpO2: 98 % (12/08/18 1133)     Objective:   General Precautions: aspiration, fall  Recent Surgery: * No surgery found *    The patient currently has (no lines).    The patient was found supine in bed in NAD with her  at bedside. She eagerly agreed to therapy.  She performed bed mobility with supervision and sat EOB x 5 minutes with supervision. Transfers and dynamic gait activities were performed to improve her balance. See below for details.     Functional Mobility:  Transfers:   · Sit <> Stand Transfer:  5 trials, min assistance for balance after removing her hands from the arm rest   · Bed <> Chair Transfer: min assistance stand pivot transfer    Gait:  Gait x 50 feet moderate assistance with LUE hand held assistance  Gait x 50, 100 feet min assistance with RUE hand held assistance, episodes of LOB with head turns and turning requiring moderate assistance  · Pt demonstrated decreased " balance, inconsistent LLE placement at initial contact, decreased LLE step length (inconsistent), and increased time in double stance  · PT encouraged or facilitated gaze on target, erect posture, maintaining path (minimizing path deviation)    Dynamic gait activities: 15 feet/ea  · Retro walking: moderate assistance x 3 trials  · Walking without HHA: moderate assistance x 3 trials  · R lateral side stepping: min assistance with bilateral UE hand held assistance x 2 trials  · L lateral side stepping: min> moderate assistance with bilateral UE hand held assistance x 2 trials    Gait repeated after balance activities:  Gait x 100 feet without hand held assistance, min assistance initially with above mentioned gait deviations but regressed to moderate assistance with inconsistent and discontinuous steps and increased LOB    Therapeutic Activities, Education, or Exercises:  PT educated patient to only perform OOB mobility with assistance from therapy or nursing (staff members) d/t fall risk. She verbalized understanding.     Time was provided for active listening, discussion of health disposition, and discussion of safe discharge recommendations. Therapist answered questions to patient/familys satisfaction within scope of practice.  Patient and family are aware of patient's deficits and therapy progression. White board updated to reflect current level of assistance.    The patient was left sitting up in chair with  present.     FUNCTIONAL OUTCOME MEASURES:  Turning over in bed (including adjusting bedclothes, sheets and blankets)?: 4  Sitting down on and standing up from a chair with arms (e.g., wheelchair, bedside commode, etc.): 3  Moving from lying on back to sitting on the side of the bed?: 4  Moving to and from a bed to a chair (including a wheelchair)?: 3  Need to walk in hospital room?: 3  Climbing 3-5 steps with a railing?: 3  Basic Mobility Total Score: 20    Goals:     Multidisciplinary Problems      Physical Therapy Goals        Problem: Physical Therapy Goal    Goal Priority Disciplines Outcome Goal Variances Interventions   Physical Therapy Goal     PT, PT/OT Ongoing (interventions implemented as appropriate)     Description:  Goals to be met by: 1 week ()     Patient will increase functional independence with mobility by performin. Supine to sit with supervision. Not met  2. Sit to supine with supervision. Not met  3. Sit to stand transfer with Stand-by Assistance. Not met  4. Bed to chair transfer with Contact Guard Assistance using Rolling Walker. Not met  5. Gait  x  75 feet with Contact guard assistance using Rolling Walker.  Not met  6. Ascend/descend 3 stairs with bilateral Handrails Contact Guard Assistance. Not met  7. Stand for 3 minutes with Contact Guard Assistance using Rolling Walker while performing a task. Not met  8. Lower extremity exercise program x 20 reps per handout, with independence. Not met                    Assessment:   Missy Ennis is a 68 y.o. female admitted with a medical diagnosis of Cerebellar stroke, acute.  She was motivated and eager to participate in therapy.  Her balance remains impaired, but she did demonstrate progress with her functional ambulation.  Pt currently requires min to moderate assistance to safely ambulate 100 feet.  She is expected to progress quickly with intensive therapy.  She is not safe to return home at this time.       Rehab Prognosis:  good; patient would benefit from acute skilled PT services to address these deficits and reach maximum level of function.      Time Tracking:     PT Received On:  18  PT Start Time:   1400    PT Stop Time:  1423  PT Total Time (min): 23 min     Billable Minutes: Gait Training 23     Lizzeth Shi, PT  2018  [phone] (pager)

## 2018-12-08 NOTE — PROGRESS NOTES
[facility]  Neurosurgery  Progress Note    Subjective:     History of Present Illness: 69 y/o female with PMHx of HTN, DM and prior stroke who had presented with worsening dizziness and N/V found to have and acute infarct Left cerebellum.  HCT without hydrocephalus. She has also been have episodes of falling and not knowing why, she has seen neurologist to get checked out for seizure activity and was placed on Vimpat. Neurosurgery consulted for evaluation. Pt takes 81 aspirin at home.            Post-Op Info:  * No surgery found *         Interval History: NAEON. Resting comfortably in chair.  at bedside. Ambulating around room with assistance from , reports ambulating well. No complaints. Denies HA, n/v, vision changes, new weakness.     Medications:  Continuous Infusions:   sodium chloride 0.9%       Scheduled Meds:   aspirin  81 mg Oral Daily    atorvastatin  40 mg Oral Daily    carvedilol  6.25 mg Oral BID WM    clopidogrel  75 mg Oral Daily    DULoxetine  60 mg Oral Daily    gabapentin  300 mg Oral BID    heparin (porcine)  5,000 Units Subcutaneous Q8H    lacosamide  150 mg Oral Daily    levothyroxine  100 mcg Oral Before breakfast    lisinopril  5 mg Oral Daily    multivit-iron-FA-calcium-mins  1 tablet Oral Daily    sodium chloride 0.9%  3 mL Intravenous Q8H     PRN Meds:acetaminophen, amitriptyline, dextrose 50%, dextrose 50%, glucagon (human recombinant), glucose, glucose, influenza, insulin aspart U-100, ondansetron, pneumoc 13-devora conj-dip cr(PF), polyethylene glycol, sodium chloride 0.9%       Objective:     Weight: 90.1 kg (198 lb 10.2 oz)  Body mass index is 34.63 kg/m².  Vital Signs (Most Recent):  Temp: 98 °F (36.7 °C) (12/08/18 1133)  Pulse: 71 (12/08/18 1133)  Resp: 16 (12/08/18 1133)  BP: 132/69 (12/08/18 1133)  SpO2: 98 % (12/08/18 1133) Vital Signs (24h Range):  Temp:  [96.7 °F (35.9 °C)-98.4 °F (36.9 °C)] 98 °F (36.7 °C)  Pulse:  [64-85] 71  Resp:   [16-18] 16  SpO2:  [95 %-98 %] 98 %  BP: (110-146)/(69-78) 132/69                    Neurosurgery Physical Exam    General: well developed, well nourished, no distress.   Head: normocephalic, atraumatic  Neurologic: Alert and oriented. Thought content appropriate.  GCS: Motor: 6/Verbal: 5/Eyes: 4 GCS Total: 15  Mental Status: Awake, Alert, Oriented x 4  Language: No aphasia  Speech: No dysarthria  Cranial nerves: face symmetric, tongue midline, CN II-XII grossly intact.   Eyes: pupils equal, round, reactive to light, EOMI.  Pulmonary: normal respirations, no signs of respiratory distress  Sensory: intact to light touch throughout  Motor Strength: Moves all extremities spontaneously with good tone.  Full strength upper and lower extremities. No abnormal movements seen.      Strength   Deltoids Triceps Biceps Wrist Extension Wrist Flexion Hand    Upper: R 5/5 5/5 5/5 5/5 5/5 5/5     L 5/5 5/5 5/5 5/5 5/5 5/5       Iliopsoas Quadriceps Knee  Flexion Tibialis  anterior Gastro- cnemius EHL   Lower: R 5/5 5/5 5/5 5/5 5/5 5/5     L 4+/5 5/5 5/5 4+/5 4+/5 5/5      Pronator Drift: no drift noted  Finger-to-nose: mild dysmetria on left side   Henning: absent  Clonus: absent  Babinski: absent  Pulses: 2+ and symmetric radial and dorsalis pedis.  Skin: Skin is warm, dry and intact.      Significant Labs:  Recent Labs   Lab 12/07/18  0600 12/08/18  0321   * 129*    141   K 4.2 3.9    104   CO2 24 26   BUN 17 16   CREATININE 0.7 0.8   CALCIUM 9.1 9.4     Recent Labs   Lab 12/07/18  0600 12/08/18  0321   WBC 6.06 7.94   HGB 13.0 12.9   HCT 40.8 39.4   * 186     No results for input(s): LABPT, INR, APTT in the last 48 hours.  Microbiology Results (last 7 days)     ** No results found for the last 168 hours. **        Recent Lab Results       12/08/18  0321   12/08/18  0256   12/07/18  1814        Immature Granulocytes 0.3         Immature Grans (Abs) 0.02  Comment:  Mild elevation in immature  granulocytes is non specific and   can be seen in a variety of conditions including stress response,   acute inflammation, trauma and pregnancy. Correlation with other   laboratory and clinical findings is essential.           Albumin 3.4         Alkaline Phosphatase 99         ALT 21         Anion Gap 11         AST 26         Baso # 0.06         Basophil% 0.8         Total Bilirubin 0.6  Comment:  For infants and newborns, interpretation of results should be based  on gestational age, weight and in agreement with clinical  observations.  Premature Infant recommended reference ranges:  Up to 24 hours.............<8.0 mg/dL  Up to 48 hours............<12.0 mg/dL  3-5 days..................<15.0 mg/dL  6-29 days.................<15.0 mg/dL           BUN, Bld 16         Calcium 9.4         Chloride 104         CO2 26         Creatinine 0.8         Differential Method Automated         eGFR if  >60.0         eGFR if non  >60.0  Comment:  Calculation used to obtain the estimated glomerular filtration  rate (eGFR) is the CKD-EPI equation.            Eos # 0.7         Eosinophil% 8.2         Glucose 129         Gran # (ANC) 5.0         Gran% 62.5         Hematocrit 39.4         Hemoglobin 12.9         Lymph # 1.6         Lymph% 19.6         MCH 31.9         MCHC 32.7         MCV 97         Mono # 0.7         Mono% 8.6         MPV 11.9         nRBC 0         Phosphorus 3.3         Platelets 186         POCT Glucose   144 188     Potassium 3.9         Total Protein 6.2         RBC 4.05         RDW 13.1         Sodium 141         WBC 7.94             All pertinent labs from the last 24 hours have been reviewed.    Significant Diagnostics:  I have reviewed all pertinent imaging in the past 24 hrs.     MRA Brain & Neck: Abrupt termination of the distal left superior cerebellar artery. The remainder of the cervical and intracranial vasculature appears within normal limits, allowing for motion.   No high-grade stenosis or large vessel occlusion identified elsewhere.        Assessment/Plan:     * Cerebellar stroke, acute    67 yo F with acute left cerebellar stroke    - Pt is neurologically stable and symptoms are improving  - MRI Brain on 12/4 shows 4th ventricle is patent without hydrocephalus  - No neurosurgical interventions indicated at this time  - EEG 12/6 normal  - MRA Brain & Neck 12/7 with abrupt termination of the distal left superior cerebellar artery and remainder of vasculature without high-grade stenosis or vessel occlusion.   - ASA 81 and lipitor 40  - PT/OT rec's rehab  - Care coordination per primary team  - Will follow, please call with questions or change in neurologic status           Ally JOCELIN Cobb  Neurosurgery  [facility]

## 2018-12-08 NOTE — PLAN OF CARE
Problem: Physical Therapy Goal  Goal: Physical Therapy Goal  Goals to be met by: 1 week ()     Patient will increase functional independence with mobility by performin. Supine to sit with supervision. Not met  2. Sit to supine with supervision. Not met  3. Sit to stand transfer with Stand-by Assistance. Not met  4. Bed to chair transfer with Contact Guard Assistance using Rolling Walker. Not met  5. Gait  x  75 feet with Contact guard assistance using Rolling Walker.  Not met  6. Ascend/descend 3 stairs with bilateral Handrails Contact Guard Assistance. Not met  7. Stand for 3 minutes with Contact Guard Assistance using Rolling Walker while performing a task. Not met  8. Lower extremity exercise program x 20 reps per handout, with independence. Not met     Outcome: Ongoing (interventions implemented as appropriate)  Patient participated well in therapy.  POC and goals remain appropriate.  Please refer to the progress note for functional mobility.     Pt is safe to perform transfers with nursing using min assistance for balance. Pt encouraged to call nsg for assistance with all OOB mobility.      Lizzeth Shi, PT  2018  [phone] (pager)

## 2018-12-08 NOTE — SUBJECTIVE & OBJECTIVE
Neurologic Chief Complaint: Patient presents with dizziness and nausea/vomiting found to have acute infarct in the left superior cerebellar artery with small lacunar infarcts in the posterior cerebellum.     Subjective:     Interval History: Patient is seen for follow-up neurological assessment and treatment recommendations: left cerebellar stroke currently on DAPT and statin. Overnight patient was afebrile. BP ranging between 115//69. 24 hour EEG results showed no seizure like activity. PT/OT has stated that the patient will benefit from inpatient rehab which she is scheduled to go Monday.    Loop recorder did not show any events of cardiac events since the patient had the stroke. MRA negative for stenosis or large vessel occlusion. NSGY has stated patient can be restarted on plavix. On examination LUE and LLE strength has improved.       HPI, Past Medical, Family, and Social History remains the same as documented in the initial encounter.     Review of Systems   Constitutional: Negative for activity change, fatigue and fever.   Cardiovascular: Negative for chest pain, palpitations and leg swelling.   Gastrointestinal: Negative for abdominal distention and abdominal pain.   Genitourinary: Negative for hematuria and urgency.   Neurological: Positive for dizziness and weakness. Negative for tremors, facial asymmetry, light-headedness and headaches.     Scheduled Meds:   aspirin  81 mg Oral Daily    atorvastatin  40 mg Oral Daily    carvedilol  6.25 mg Oral BID WM    DULoxetine  60 mg Oral Daily    gabapentin  300 mg Oral BID    heparin (porcine)  5,000 Units Subcutaneous Q8H    lacosamide  150 mg Oral Daily    levothyroxine  100 mcg Oral Before breakfast    lisinopril  5 mg Oral Daily    multivit-iron-FA-calcium-mins  1 tablet Oral Daily    sodium chloride 0.9%  3 mL Intravenous Q8H     Continuous Infusions:   sodium chloride 0.9%       PRN Meds:acetaminophen, amitriptyline, dextrose 50%, dextrose  50%, glucagon (human recombinant), glucose, glucose, influenza, insulin aspart U-100, ondansetron, pneumoc 13-devora conj-dip cr(PF), polyethylene glycol, sodium chloride 0.9%    Objective:     Vital Signs (Most Recent):  Temp: 98.4 °F (36.9 °C) (12/08/18 1539)  Pulse: 77 (12/08/18 1539)  Resp: 16 (12/08/18 1539)  BP: (!) 164/76 (12/08/18 1539)  SpO2: 96 % (12/08/18 1539)  BP Location: Left arm    Vital Signs Range (Last 24H):  Temp:  [96.7 °F (35.9 °C)-98.4 °F (36.9 °C)]   Pulse:  [64-85]   Resp:  [16-18]   BP: (110-164)/(69-78)   SpO2:  [95 %-98 %]   BP Location: Left arm    Physical Exam   Constitutional: She is oriented to person, place, and time. She appears well-developed and well-nourished.   HENT:   Head: Normocephalic and atraumatic.   Eyes: EOM are normal. Pupils are equal, round, and reactive to light.   Neck: Normal range of motion.   Cardiovascular: Normal rate.   Pulmonary/Chest: Effort normal.   Abdominal: Soft.   Musculoskeletal: Normal range of motion.   Neurological: She is alert and oriented to person, place, and time.   Skin: Skin is warm and dry.   Nursing note and vitals reviewed.      Neurological Exam:   LOC: alert  Language: No aphasia  EOM (CN III, IV, VI): Full/intact  Pupils (CN II, III): PERRL  Reflexes: 2+ throughout  Motor: Arm right  Paresis: 4/5  Leg right Paresis: 4/5  Cebellar: Upper Extremity Appendicular Ataxia (Finger Nose Finger)  Left  Tone: Normal tone throughout    Laboratory:  CMP:   Recent Labs   Lab 12/08/18  0321   CALCIUM 9.4   ALBUMIN 3.4*   PROT 6.2      K 3.9   CO2 26      BUN 16   CREATININE 0.8   ALKPHOS 99   ALT 21   AST 26   BILITOT 0.6     BMP:   Recent Labs   Lab 12/08/18  0321      K 3.9      CO2 26   BUN 16   CREATININE 0.8   CALCIUM 9.4     CBC:   Recent Labs   Lab 12/08/18  0321   WBC 7.94   RBC 4.05   HGB 12.9   HCT 39.4      MCV 97   MCH 31.9*   MCHC 32.7       Diagnostic Results     Brain Imaging       Geographic area T2/FLAIR  signal hyperintensity with diffusion restriction and low signal on ADC without enhancement likely corresponds to an acute infarct in the distribution of the left superficial cerebellar artery.  Also there are small remote lacunar-type infarcts posterior cerebellum bilaterally.    Additionally there is a nonspecific area of nonenhancing heterogenous signal intensity located in the right posterior occipital lobe and posterior parietal lobe.    In the right frontal lobe there is a nonenhancing predominantly T1 and T2 FLAIR hypointense area measuring 3.4 x 1.9 cm which demonstrates surrounding increased T2 FLAIR signal in the surrounding white matter without mass effect.  No midline shift. No hemorrhage seen.  No evidence of hydrocephalus.  No extra-axial fluid collections.  Visualized major vascular flow voids are preserved.  Bone marrow signal intensity appears normal.    Vessel Imaging   MRA Neck and Brain  -Abrupt termination of the distal left superior cerebellar artery.  -The remainder of the cervical and intracranial vasculature appears within normal limits, allowing for motion.  No high-grade stenosis or large vessel occlusion identified elsewhere.  Cardiac Imaging   · The aortic root is mildly dilated, measuring 3.8 cm.  · Normal left ventricular systolic function. The estimated ejection fraction is 65%  · Normal right ventricular systolic function.  · No wall motion abnormalities.  · Indeterminate left ventricular diastolic function.  · Normal central venous pressure (3 mm Hg).  · The estimated PA systolic pressure is 20.47 mm Hg  · Trivial pericardial effusion.

## 2018-12-08 NOTE — SUBJECTIVE & OBJECTIVE
Interval History: NAEON. Resting comfortably in chair.  at bedside. Ambulating around room with assistance from , reports ambulating well. No complaints. Denies HA, n/v, vision changes, new weakness.     Medications:  Continuous Infusions:   sodium chloride 0.9%       Scheduled Meds:   aspirin  81 mg Oral Daily    atorvastatin  40 mg Oral Daily    carvedilol  6.25 mg Oral BID WM    clopidogrel  75 mg Oral Daily    DULoxetine  60 mg Oral Daily    gabapentin  300 mg Oral BID    heparin (porcine)  5,000 Units Subcutaneous Q8H    lacosamide  150 mg Oral Daily    levothyroxine  100 mcg Oral Before breakfast    lisinopril  5 mg Oral Daily    multivit-iron-FA-calcium-mins  1 tablet Oral Daily    sodium chloride 0.9%  3 mL Intravenous Q8H     PRN Meds:acetaminophen, amitriptyline, dextrose 50%, dextrose 50%, glucagon (human recombinant), glucose, glucose, influenza, insulin aspart U-100, ondansetron, pneumoc 13-devora conj-dip cr(PF), polyethylene glycol, sodium chloride 0.9%       Objective:     Weight: 90.1 kg (198 lb 10.2 oz)  Body mass index is 34.63 kg/m².  Vital Signs (Most Recent):  Temp: 98 °F (36.7 °C) (12/08/18 1133)  Pulse: 71 (12/08/18 1133)  Resp: 16 (12/08/18 1133)  BP: 132/69 (12/08/18 1133)  SpO2: 98 % (12/08/18 1133) Vital Signs (24h Range):  Temp:  [96.7 °F (35.9 °C)-98.4 °F (36.9 °C)] 98 °F (36.7 °C)  Pulse:  [64-85] 71  Resp:  [16-18] 16  SpO2:  [95 %-98 %] 98 %  BP: (110-146)/(69-78) 132/69                    Neurosurgery Physical Exam    General: well developed, well nourished, no distress.   Head: normocephalic, atraumatic  Neurologic: Alert and oriented. Thought content appropriate.  GCS: Motor: 6/Verbal: 5/Eyes: 4 GCS Total: 15  Mental Status: Awake, Alert, Oriented x 4  Language: No aphasia  Speech: No dysarthria  Cranial nerves: face symmetric, tongue midline, CN II-XII grossly intact.   Eyes: pupils equal, round, reactive to light, EOMI.  Pulmonary: normal respirations, no  signs of respiratory distress  Sensory: intact to light touch throughout  Motor Strength: Moves all extremities spontaneously with good tone.  Full strength upper and lower extremities. No abnormal movements seen.      Strength   Deltoids Triceps Biceps Wrist Extension Wrist Flexion Hand    Upper: R 5/5 5/5 5/5 5/5 5/5 5/5     L 5/5 5/5 5/5 5/5 5/5 5/5       Iliopsoas Quadriceps Knee  Flexion Tibialis  anterior Gastro- cnemius EHL   Lower: R 5/5 5/5 5/5 5/5 5/5 5/5     L 4+/5 5/5 5/5 4+/5 4+/5 5/5      Pronator Drift: no drift noted  Finger-to-nose: mild dysmetria on left side   Henning: absent  Clonus: absent  Babinski: absent  Pulses: 2+ and symmetric radial and dorsalis pedis.  Skin: Skin is warm, dry and intact.      Significant Labs:  Recent Labs   Lab 12/07/18  0600 12/08/18  0321   * 129*    141   K 4.2 3.9    104   CO2 24 26   BUN 17 16   CREATININE 0.7 0.8   CALCIUM 9.1 9.4     Recent Labs   Lab 12/07/18  0600 12/08/18  0321   WBC 6.06 7.94   HGB 13.0 12.9   HCT 40.8 39.4   * 186     No results for input(s): LABPT, INR, APTT in the last 48 hours.  Microbiology Results (last 7 days)     ** No results found for the last 168 hours. **        Recent Lab Results       12/08/18  0321   12/08/18  0256   12/07/18  1814        Immature Granulocytes 0.3         Immature Grans (Abs) 0.02  Comment:  Mild elevation in immature granulocytes is non specific and   can be seen in a variety of conditions including stress response,   acute inflammation, trauma and pregnancy. Correlation with other   laboratory and clinical findings is essential.           Albumin 3.4         Alkaline Phosphatase 99         ALT 21         Anion Gap 11         AST 26         Baso # 0.06         Basophil% 0.8         Total Bilirubin 0.6  Comment:  For infants and newborns, interpretation of results should be based  on gestational age, weight and in agreement with clinical  observations.  Premature Infant recommended  reference ranges:  Up to 24 hours.............<8.0 mg/dL  Up to 48 hours............<12.0 mg/dL  3-5 days..................<15.0 mg/dL  6-29 days.................<15.0 mg/dL           BUN, Bld 16         Calcium 9.4         Chloride 104         CO2 26         Creatinine 0.8         Differential Method Automated         eGFR if  >60.0         eGFR if non  >60.0  Comment:  Calculation used to obtain the estimated glomerular filtration  rate (eGFR) is the CKD-EPI equation.            Eos # 0.7         Eosinophil% 8.2         Glucose 129         Gran # (ANC) 5.0         Gran% 62.5         Hematocrit 39.4         Hemoglobin 12.9         Lymph # 1.6         Lymph% 19.6         MCH 31.9         MCHC 32.7         MCV 97         Mono # 0.7         Mono% 8.6         MPV 11.9         nRBC 0         Phosphorus 3.3         Platelets 186         POCT Glucose   144 188     Potassium 3.9         Total Protein 6.2         RBC 4.05         RDW 13.1         Sodium 141         WBC 7.94             All pertinent labs from the last 24 hours have been reviewed.    Significant Diagnostics:  I have reviewed all pertinent imaging in the past 24 hrs.     MRA Brain & Neck: Abrupt termination of the distal left superior cerebellar artery. The remainder of the cervical and intracranial vasculature appears within normal limits, allowing for motion.  No high-grade stenosis or large vessel occlusion identified elsewhere.

## 2018-12-09 LAB
ALBUMIN SERPL BCP-MCNC: 3.7 G/DL
ALP SERPL-CCNC: 115 U/L
ALT SERPL W/O P-5'-P-CCNC: 48 U/L
ANION GAP SERPL CALC-SCNC: 7 MMOL/L
AST SERPL-CCNC: 56 U/L
BASOPHILS # BLD AUTO: 0.07 K/UL
BASOPHILS NFR BLD: 0.9 %
BILIRUB SERPL-MCNC: 0.6 MG/DL
BUN SERPL-MCNC: 16 MG/DL
CALCIUM SERPL-MCNC: 10.1 MG/DL
CHLORIDE SERPL-SCNC: 104 MMOL/L
CO2 SERPL-SCNC: 29 MMOL/L
CREAT SERPL-MCNC: 0.8 MG/DL
DIFFERENTIAL METHOD: ABNORMAL
EOSINOPHIL # BLD AUTO: 0.8 K/UL
EOSINOPHIL NFR BLD: 9.5 %
ERYTHROCYTE [DISTWIDTH] IN BLOOD BY AUTOMATED COUNT: 13.2 %
EST. GFR  (AFRICAN AMERICAN): >60 ML/MIN/1.73 M^2
EST. GFR  (NON AFRICAN AMERICAN): >60 ML/MIN/1.73 M^2
GLUCOSE SERPL-MCNC: 134 MG/DL
HCT VFR BLD AUTO: 43.1 %
HGB BLD-MCNC: 14 G/DL
IMM GRANULOCYTES # BLD AUTO: 0.02 K/UL
IMM GRANULOCYTES NFR BLD AUTO: 0.2 %
LYMPHOCYTES # BLD AUTO: 1.7 K/UL
LYMPHOCYTES NFR BLD: 20.7 %
MCH RBC QN AUTO: 31.8 PG
MCHC RBC AUTO-ENTMCNC: 32.5 G/DL
MCV RBC AUTO: 98 FL
MONOCYTES # BLD AUTO: 0.7 K/UL
MONOCYTES NFR BLD: 8.9 %
NEUTROPHILS # BLD AUTO: 4.8 K/UL
NEUTROPHILS NFR BLD: 59.8 %
NRBC BLD-RTO: 0 /100 WBC
PHOSPHATE SERPL-MCNC: 4.2 MG/DL
PLATELET # BLD AUTO: 222 K/UL
PMV BLD AUTO: 12.3 FL
POCT GLUCOSE: 123 MG/DL (ref 70–110)
POCT GLUCOSE: 141 MG/DL (ref 70–110)
POCT GLUCOSE: 167 MG/DL (ref 70–110)
POCT GLUCOSE: 176 MG/DL (ref 70–110)
POTASSIUM SERPL-SCNC: 4.5 MMOL/L
PROT SERPL-MCNC: 6.9 G/DL
RBC # BLD AUTO: 4.4 M/UL
SODIUM SERPL-SCNC: 140 MMOL/L
WBC # BLD AUTO: 8.07 K/UL

## 2018-12-09 PROCEDURE — 99233 SBSQ HOSP IP/OBS HIGH 50: CPT | Mod: GC,,, | Performed by: PSYCHIATRY & NEUROLOGY

## 2018-12-09 PROCEDURE — 25000003 PHARM REV CODE 250: Performed by: PSYCHIATRY & NEUROLOGY

## 2018-12-09 PROCEDURE — 97530 THERAPEUTIC ACTIVITIES: CPT

## 2018-12-09 PROCEDURE — 25000003 PHARM REV CODE 250: Performed by: STUDENT IN AN ORGANIZED HEALTH CARE EDUCATION/TRAINING PROGRAM

## 2018-12-09 PROCEDURE — 63600175 PHARM REV CODE 636 W HCPCS: Performed by: NURSE PRACTITIONER

## 2018-12-09 PROCEDURE — A4216 STERILE WATER/SALINE, 10 ML: HCPCS | Performed by: NURSE PRACTITIONER

## 2018-12-09 PROCEDURE — 36415 COLL VENOUS BLD VENIPUNCTURE: CPT

## 2018-12-09 PROCEDURE — 20600001 HC STEP DOWN PRIVATE ROOM

## 2018-12-09 PROCEDURE — 85025 COMPLETE CBC W/AUTO DIFF WBC: CPT

## 2018-12-09 PROCEDURE — 80053 COMPREHEN METABOLIC PANEL: CPT

## 2018-12-09 PROCEDURE — 25000003 PHARM REV CODE 250: Performed by: NURSE PRACTITIONER

## 2018-12-09 PROCEDURE — 99222 1ST HOSP IP/OBS MODERATE 55: CPT | Mod: ,,, | Performed by: NEUROLOGICAL SURGERY

## 2018-12-09 PROCEDURE — 84100 ASSAY OF PHOSPHORUS: CPT

## 2018-12-09 RX ADMIN — LACOSAMIDE 150 MG: 50 TABLET, FILM COATED ORAL at 10:12

## 2018-12-09 RX ADMIN — LISINOPRIL 5 MG: 5 TABLET ORAL at 10:12

## 2018-12-09 RX ADMIN — ATORVASTATIN CALCIUM 40 MG: 20 TABLET, FILM COATED ORAL at 10:12

## 2018-12-09 RX ADMIN — CARVEDILOL 6.25 MG: 6.25 TABLET, FILM COATED ORAL at 04:12

## 2018-12-09 RX ADMIN — DULOXETINE 60 MG: 30 CAPSULE, DELAYED RELEASE ORAL at 10:12

## 2018-12-09 RX ADMIN — AMITRIPTYLINE HYDROCHLORIDE 25 MG: 25 TABLET, FILM COATED ORAL at 09:12

## 2018-12-09 RX ADMIN — LEVOTHYROXINE SODIUM 100 MCG: 100 TABLET ORAL at 06:12

## 2018-12-09 RX ADMIN — CARVEDILOL 6.25 MG: 6.25 TABLET, FILM COATED ORAL at 06:12

## 2018-12-09 RX ADMIN — INSULIN ASPART 2 UNITS: 100 INJECTION, SOLUTION INTRAVENOUS; SUBCUTANEOUS at 06:12

## 2018-12-09 RX ADMIN — DIPHENHYDRAMINE HYDROCHLORIDE 25 MG: 25 CAPSULE ORAL at 09:12

## 2018-12-09 RX ADMIN — MULTIPLE VITAMINS W/ MINERALS TAB 1 TABLET: TAB at 10:12

## 2018-12-09 RX ADMIN — HEPARIN SODIUM 5000 UNITS: 5000 INJECTION, SOLUTION INTRAVENOUS; SUBCUTANEOUS at 06:12

## 2018-12-09 RX ADMIN — CLOPIDOGREL 75 MG: 75 TABLET, FILM COATED ORAL at 10:12

## 2018-12-09 RX ADMIN — ASPIRIN 81 MG: 81 TABLET, COATED ORAL at 10:12

## 2018-12-09 RX ADMIN — Medication 3 ML: at 01:12

## 2018-12-09 RX ADMIN — INSULIN ASPART 2 UNITS: 100 INJECTION, SOLUTION INTRAVENOUS; SUBCUTANEOUS at 09:12

## 2018-12-09 RX ADMIN — GABAPENTIN 300 MG: 300 CAPSULE ORAL at 09:12

## 2018-12-09 RX ADMIN — HEPARIN SODIUM 5000 UNITS: 5000 INJECTION, SOLUTION INTRAVENOUS; SUBCUTANEOUS at 01:12

## 2018-12-09 RX ADMIN — DIPHENHYDRAMINE HYDROCHLORIDE 25 MG: 25 CAPSULE ORAL at 06:12

## 2018-12-09 RX ADMIN — Medication 3 ML: at 06:12

## 2018-12-09 RX ADMIN — HEPARIN SODIUM 5000 UNITS: 5000 INJECTION, SOLUTION INTRAVENOUS; SUBCUTANEOUS at 09:12

## 2018-12-09 RX ADMIN — GABAPENTIN 300 MG: 300 CAPSULE ORAL at 10:12

## 2018-12-09 NOTE — PT/OT/SLP PROGRESS
Occupational Therapy   Treatment    Name: Missy Ennis  MRN: [id number]  Admitting Diagnosis:  Cerebellar stroke, acute       Recommendations:     Discharge Recommendations: rehabilitation facility  Discharge Equipment Recommendations:  walker, rolling, commode  Barriers to discharge:  Other (Comment)(increased assistance needed)    Subjective     Pain/Comfort:  · Pain Rating 1: 0/10  · Pain Rating Post-Intervention 1: 0/10    Objective:     Communicated with: RN prior to session.  Patient found supine with peripheral IV upon OT entry to room.    General Precautions: Standard, fall, aspiration   Orthopedic Precautions:N/A   Braces: N/A     Occupational Performance:    Bed Mobility:    · Patient completed Supine to Sit with stand by assistance  · Patient completed Sit to Supine with stand by assistance     Functional Mobility/Transfers:  · Patient completed Sit <> Stand Transfer with contact guard assistance with no assistive device from EOB  · Functional Mobility: Within room and hallway household distance with CGA using RW; verbal cues to remain inside of walker and for awareness of L side while ambulating--pt bumped into the door on her L side while ambulating    Activities of Daily Living:  · Upper Body Dressing: minimum assistance to don/doff robe  · Lower Body Dressing: contact guard assistance sitting EOB to don socks      [facility] 6 Click ADL: 18    Treatment & Education:  Pt perseverating on taking a shower throughout treatment; completed seated dressing tasks, functional mobility as described above, and returned to EOB; declined sitting up in chair due to just returning to bed; educated on UE therex to improve L UE ROM and coordination and pt demo progress and understanding  RN ordered RW for use in room     Patient left HOB elevated with all lines intact, call button in reach and RN notified  Education:    Assessment:     Missy Ennis is a 68 y.o. female with a medical diagnosis of Cerebellar stroke,  acute.  She presents with the following performance deficits affecting function are weakness, impaired endurance, impaired self care skills, impaired functional mobilty, gait instability, decreased upper extremity function, decreased safety awareness, impaired coordination. Pt progressing toward goals and would continue to benefit from OT to increase functional independence and safety. Recommend rehab upon D/C.    Rehab Prognosis:  Good; patient would benefit from acute skilled OT services to address these deficits and reach maximum level of function.       Plan:     Patient to be seen 4 x/week to address the above listed problems via self-care/home management, therapeutic activities, therapeutic exercises, neuromuscular re-education, cognitive retraining  · Plan of Care Expires: 01/03/19  · Plan of Care Reviewed with: patient    This Plan of care has been discussed with the patient who was involved in its development and understands and is in agreement with the identified goals and treatment plan    GOALS:   Multidisciplinary Problems     Occupational Therapy Goals        Problem: Occupational Therapy Goal    Goal Priority Disciplines Outcome Interventions   Occupational Therapy Goal     OT, PT/OT Ongoing (interventions implemented as appropriate)    Description:  Goals to be met by: 12/16     Patient will increase functional independence with ADLs by performing:    Bed mobility and supine to sit with CGA and HOB flat.  UE Dressing with Set-up Assistance and Supervision.  LE Dressing (pants, brief) with Set-up Assistance and Stand-by Assistance.  Grooming while standing at sink with Stand-by Assistance.  Toileting from toilet with Stand-by Assistance for hygiene and clothing management.   Toilet transfer to toilet with Stand-by Assistance.  Functional mobility at household distance for ADL task with CGA and AD as needed.-MET  Pt/CG verbalized understanding for s/s of stroke.   Pt will follow multi step commands  without added cues or time.   Pt will complete hand eye coordination task for L UE with 4/5 accuracy.                        Time Tracking:     OT Date of Treatment: 12/09/18  OT Start Time: 1122  OT Stop Time: 1135  OT Total Time (min): 13 min    Billable Minutes:Therapeutic Activity 13 minutes    RAYO Loera  12/9/2018

## 2018-12-09 NOTE — SUBJECTIVE & OBJECTIVE
Interval History: naeon    Medications:  Continuous Infusions:   sodium chloride 0.9%       Scheduled Meds:   aspirin  81 mg Oral Daily    atorvastatin  40 mg Oral Daily    carvedilol  6.25 mg Oral BID WM    clopidogrel  75 mg Oral Daily    DULoxetine  60 mg Oral Daily    gabapentin  300 mg Oral BID    heparin (porcine)  5,000 Units Subcutaneous Q8H    lacosamide  150 mg Oral Daily    levothyroxine  100 mcg Oral Before breakfast    lisinopril  5 mg Oral Daily    multivit-iron-FA-calcium-mins  1 tablet Oral Daily    sodium chloride 0.9%  3 mL Intravenous Q8H     PRN Meds:acetaminophen, amitriptyline, dextrose 50%, dextrose 50%, diphenhydrAMINE, glucagon (human recombinant), glucose, glucose, influenza, insulin aspart U-100, ondansetron, pneumoc 13-devora conj-dip cr(PF), polyethylene glycol, sodium chloride 0.9%     Review of Systems  Objective:     Weight: 90.1 kg (198 lb 10.2 oz)  Body mass index is 34.63 kg/m².  Vital Signs (Most Recent):  Temp: 96.6 °F (35.9 °C) (12/08/18 2332)  Pulse: 75 (12/09/18 0359)  Resp: 18 (12/08/18 2015)  BP: 130/64 (12/09/18 0359)  SpO2: (!) 89 % (12/09/18 0359) Vital Signs (24h Range):  Temp:  [96.6 °F (35.9 °C)-98.5 °F (36.9 °C)] 96.6 °F (35.9 °C)  Pulse:  [69-77] 75  Resp:  [16-18] 18  SpO2:  [89 %-98 %] 89 %  BP: (123-164)/(61-76) 130/64                           Neurosurgery Physical Exam   General: well developed, well nourished, no distress.   Head: normocephalic, atraumatic  Neurologic: Alert and oriented. Thought content appropriate.  GCS: Motor: 6/Verbal: 5/Eyes: 4 GCS Total: 15  Mental Status: Awake, Alert, Oriented x 4  Language: No aphasia  Speech: No dysarthria  Cranial nerves: face symmetric, tongue midline, CN II-XII grossly intact.   Eyes: pupils equal, round, reactive to light, EOMI.  Pulmonary: normal respirations, no signs of respiratory distress  Sensory: intact to light touch throughout  Motor Strength: Moves all extremities spontaneously with good tone.   Full strength upper and lower extremities. No abnormal movements seen.      Strength   Deltoids Triceps Biceps Wrist Extension Wrist Flexion Hand    Upper: R 5/5 5/5 5/5 5/5 5/5 5/5     L 5/5 5/5 5/5 5/5 5/5 5/5       Iliopsoas Quadriceps Knee  Flexion Tibialis  anterior Gastro- cnemius EHL   Lower: R 5/5 5/5 5/5 5/5 5/5 5/5     L 4+/5 5/5 5/5 4+/5 4+/5 5/5      Pronator Drift: no drift noted  Finger-to-nose: mild dysmetria on left side   Henning: absent  Clonus: absent  Babinski: absent  Pulses: 2+ and symmetric radial and dorsalis pedis.  Skin: Skin is warm, dry and intact.          Significant Labs:  Recent Labs   Lab 12/08/18  0321 12/09/18  0548   * 134*    140   K 3.9 4.5    104   CO2 26 29   BUN 16 16   CREATININE 0.8 0.8   CALCIUM 9.4 10.1     Recent Labs   Lab 12/08/18  0321 12/09/18  0548   WBC 7.94 8.07   HGB 12.9 14.0   HCT 39.4 43.1    222     No results for input(s): LABPT, INR, APTT in the last 48 hours.  Microbiology Results (last 7 days)     ** No results found for the last 168 hours. **            Significant Diagnostics:

## 2018-12-09 NOTE — SUBJECTIVE & OBJECTIVE
Neurologic Chief Complaint: Patient presents with dizziness and nausea/vomiting found to have acute infarct in the left superior cerebellar artery with small lacunar infarcts in the posterior cerebellum.     Subjective:     Interval History: Patient is seen for follow-up neurological assessment and treatment recommendations: left cerebellar stroke currently on DAPT and statin. Overnight patient was afebrile. BP ranging between 123//76. 24 hour EEG results showed no seizure like activity. PT/OT has stated that the patient will benefit from inpatient rehab which she is scheduled to go Monday or Tuesday.    Patient has been complaining of left sided deficits since this recent stroke. She mentions that at times she will accidentally hit the left side of her face without knowing. Will continue to monitor.    Strength 5/5 in both upper and lower extremities. Patient extremely irritable today.      HPI, Past Medical, Family, and Social History remains the same as documented in the initial encounter.     Review of Systems   Constitutional: Negative for activity change, fatigue and fever.   Cardiovascular: Negative for chest pain, palpitations and leg swelling.   Gastrointestinal: Negative for abdominal distention and abdominal pain.   Genitourinary: Negative for hematuria and urgency.   Neurological: Positive for dizziness and weakness. Negative for tremors, facial asymmetry, light-headedness and headaches.     Scheduled Meds:   aspirin  81 mg Oral Daily    atorvastatin  40 mg Oral Daily    carvedilol  6.25 mg Oral BID WM    clopidogrel  75 mg Oral Daily    DULoxetine  60 mg Oral Daily    gabapentin  300 mg Oral BID    heparin (porcine)  5,000 Units Subcutaneous Q8H    lacosamide  150 mg Oral Daily    levothyroxine  100 mcg Oral Before breakfast    lisinopril  5 mg Oral Daily    multivit-iron-FA-calcium-mins  1 tablet Oral Daily    sodium chloride 0.9%  3 mL Intravenous Q8H     Continuous Infusions:    sodium chloride 0.9%       PRN Meds:acetaminophen, amitriptyline, dextrose 50%, dextrose 50%, diphenhydrAMINE, glucagon (human recombinant), glucose, glucose, influenza, insulin aspart U-100, ondansetron, pneumoc 13-devora conj-dip cr(PF), polyethylene glycol, sodium chloride 0.9%    Objective:     Vital Signs (Most Recent):  Temp: 96.6 °F (35.9 °C) (12/08/18 2332)  Pulse: 75 (12/09/18 0359)  Resp: 18 (12/08/18 2015)  BP: 130/64 (12/09/18 0359)  SpO2: (!) 89 % (12/09/18 0359)  BP Location: Left arm    Vital Signs Range (Last 24H):  Temp:  [96.6 °F (35.9 °C)-98.5 °F (36.9 °C)]   Pulse:  [69-77]   Resp:  [16-18]   BP: (126-164)/(61-76)   SpO2:  [89 %-96 %]   BP Location: Left arm    Physical Exam   Constitutional: She is oriented to person, place, and time. She appears well-developed and well-nourished.   HENT:   Head: Normocephalic and atraumatic.   Eyes: EOM are normal. Pupils are equal, round, and reactive to light.   Neck: Normal range of motion.   Cardiovascular: Normal rate.   Pulmonary/Chest: Effort normal.   Abdominal: Soft.   Musculoskeletal: Normal range of motion.   Neurological: She is alert and oriented to person, place, and time.   Skin: Skin is warm and dry.   Nursing note and vitals reviewed.      Neurological Exam:   LOC: alert  Language: No aphasia  EOM (CN III, IV, VI): Full/intact  Pupils (CN II, III): PERRL  Reflexes: 2+ throughout  Motor: Arm right  Paresis: 4/5  Leg right Paresis: 4/5  Cebellar: Upper Extremity Appendicular Ataxia (Finger Nose Finger)  Left  Tone: Normal tone throughout    Laboratory:  CMP:   Recent Labs   Lab 12/09/18  0548   CALCIUM 10.1   ALBUMIN 3.7   PROT 6.9      K 4.5   CO2 29      BUN 16   CREATININE 0.8   ALKPHOS 115   ALT 48*   AST 56*   BILITOT 0.6     BMP:   Recent Labs   Lab 12/09/18  0548      K 4.5      CO2 29   BUN 16   CREATININE 0.8   CALCIUM 10.1     CBC:   Recent Labs   Lab 12/09/18  0548   WBC 8.07   RBC 4.40   HGB 14.0   HCT 43.1   PLT  222   MCV 98   MCH 31.8*   MCHC 32.5       Diagnostic Results     Brain Imaging       Geographic area T2/FLAIR signal hyperintensity with diffusion restriction and low signal on ADC without enhancement likely corresponds to an acute infarct in the distribution of the left superficial cerebellar artery.  Also there are small remote lacunar-type infarcts posterior cerebellum bilaterally.    Additionally there is a nonspecific area of nonenhancing heterogenous signal intensity located in the right posterior occipital lobe and posterior parietal lobe.    In the right frontal lobe there is a nonenhancing predominantly T1 and T2 FLAIR hypointense area measuring 3.4 x 1.9 cm which demonstrates surrounding increased T2 FLAIR signal in the surrounding white matter without mass effect.  No midline shift. No hemorrhage seen.  No evidence of hydrocephalus.  No extra-axial fluid collections.  Visualized major vascular flow voids are preserved.  Bone marrow signal intensity appears normal.    Vessel Imaging   MRA Neck and Brain  -Abrupt termination of the distal left superior cerebellar artery.  -The remainder of the cervical and intracranial vasculature appears within normal limits, allowing for motion.  No high-grade stenosis or large vessel occlusion identified elsewhere.  Cardiac Imaging   · The aortic root is mildly dilated, measuring 3.8 cm.  · Normal left ventricular systolic function. The estimated ejection fraction is 65%  · Normal right ventricular systolic function.  · No wall motion abnormalities.  · Indeterminate left ventricular diastolic function.  · Normal central venous pressure (3 mm Hg).  · The estimated PA systolic pressure is 20.47 mm Hg  · Trivial pericardial effusion.

## 2018-12-09 NOTE — PLAN OF CARE
Problem: Occupational Therapy Goal  Goal: Occupational Therapy Goal  Goals to be met by: 12/16     Patient will increase functional independence with ADLs by performing:    Bed mobility and supine to sit with CGA and HOB flat.  UE Dressing with Set-up Assistance and Supervision.  LE Dressing (pants, brief) with Set-up Assistance and Stand-by Assistance.  Grooming while standing at sink with Stand-by Assistance.  Toileting from toilet with Stand-by Assistance for hygiene and clothing management.   Toilet transfer to toilet with Stand-by Assistance.  Functional mobility at household distance for ADL task with CGA and AD as needed.-MET  Pt/CG verbalized understanding for s/s of stroke.   Pt will follow multi step commands without added cues or time.   Pt will complete hand eye coordination task for L UE with 4/5 accuracy.      Outcome: Ongoing (interventions implemented as appropriate)  Continue OT plan of care.

## 2018-12-09 NOTE — ASSESSMENT & PLAN NOTE
69 yo F with acute left cerebellar stroke    - Pt is neurologically stable and symptoms are improving  - MRI Brain on 12/4 shows 4th ventricle is patent without hydrocephalus  - No neurosurgical interventions indicated at this time  - EEG 12/6 normal  - MRA Brain & Neck 12/7 with abrupt termination of the distal left superior cerebellar artery and remainder of vasculature without high-grade stenosis or vessel occlusion.   - ASA and plavix, statin  - PT/OT rec's rehab  - Care coordination per primary team  - Will follow, please call with questions or change in neurologic status

## 2018-12-09 NOTE — PROGRESS NOTES
[facility]  Neurosurgery  Progress Note    Subjective:     History of Present Illness: 67 y/o female with PMHx of HTN, DM and prior stroke who had presented with worsening dizziness and N/V found to have and acute infarct Left cerebellum.  HCT without hydrocephalus. She has also been have episodes of falling and not knowing why, she has seen neurologist to get checked out for seizure activity and was placed on Vimpat. Neurosurgery consulted for evaluation. Pt takes 81 aspirin at home.            Post-Op Info:  * No surgery found *         Interval History: naeon    Medications:  Continuous Infusions:   sodium chloride 0.9%       Scheduled Meds:   aspirin  81 mg Oral Daily    atorvastatin  40 mg Oral Daily    carvedilol  6.25 mg Oral BID WM    clopidogrel  75 mg Oral Daily    DULoxetine  60 mg Oral Daily    gabapentin  300 mg Oral BID    heparin (porcine)  5,000 Units Subcutaneous Q8H    lacosamide  150 mg Oral Daily    levothyroxine  100 mcg Oral Before breakfast    lisinopril  5 mg Oral Daily    multivit-iron-FA-calcium-mins  1 tablet Oral Daily    sodium chloride 0.9%  3 mL Intravenous Q8H     PRN Meds:acetaminophen, amitriptyline, dextrose 50%, dextrose 50%, diphenhydrAMINE, glucagon (human recombinant), glucose, glucose, influenza, insulin aspart U-100, ondansetron, pneumoc 13-devora conj-dip cr(PF), polyethylene glycol, sodium chloride 0.9%     Review of Systems  Objective:     Weight: 90.1 kg (198 lb 10.2 oz)  Body mass index is 34.63 kg/m².  Vital Signs (Most Recent):  Temp: 96.6 °F (35.9 °C) (12/08/18 2332)  Pulse: 75 (12/09/18 0359)  Resp: 18 (12/08/18 2015)  BP: 130/64 (12/09/18 0359)  SpO2: (!) 89 % (12/09/18 0359) Vital Signs (24h Range):  Temp:  [96.6 °F (35.9 °C)-98.5 °F (36.9 °C)] 96.6 °F (35.9 °C)  Pulse:  [69-77] 75  Resp:  [16-18] 18  SpO2:  [89 %-98 %] 89 %  BP: (123-164)/(61-76) 130/64                           Neurosurgery Physical Exam   General: well developed,  well nourished, no distress.   Head: normocephalic, atraumatic  Neurologic: Alert and oriented. Thought content appropriate.  GCS: Motor: 6/Verbal: 5/Eyes: 4 GCS Total: 15  Mental Status: Awake, Alert, Oriented x 4  Language: No aphasia  Speech: No dysarthria  Cranial nerves: face symmetric, tongue midline, CN II-XII grossly intact.   Eyes: pupils equal, round, reactive to light, EOMI.  Pulmonary: normal respirations, no signs of respiratory distress  Sensory: intact to light touch throughout  Motor Strength: Moves all extremities spontaneously with good tone.  Full strength upper and lower extremities. No abnormal movements seen.      Strength   Deltoids Triceps Biceps Wrist Extension Wrist Flexion Hand    Upper: R 5/5 5/5 5/5 5/5 5/5 5/5     L 5/5 5/5 5/5 5/5 5/5 5/5       Iliopsoas Quadriceps Knee  Flexion Tibialis  anterior Gastro- cnemius EHL   Lower: R 5/5 5/5 5/5 5/5 5/5 5/5     L 4+/5 5/5 5/5 4+/5 4+/5 5/5      Pronator Drift: no drift noted  Finger-to-nose: mild dysmetria on left side   Henning: absent  Clonus: absent  Babinski: absent  Pulses: 2+ and symmetric radial and dorsalis pedis.  Skin: Skin is warm, dry and intact.          Significant Labs:  Recent Labs   Lab 12/08/18  0321 12/09/18  0548   * 134*    140   K 3.9 4.5    104   CO2 26 29   BUN 16 16   CREATININE 0.8 0.8   CALCIUM 9.4 10.1     Recent Labs   Lab 12/08/18  0321 12/09/18  0548   WBC 7.94 8.07   HGB 12.9 14.0   HCT 39.4 43.1    222     No results for input(s): LABPT, INR, APTT in the last 48 hours.  Microbiology Results (last 7 days)     ** No results found for the last 168 hours. **            Significant Diagnostics:      Assessment/Plan:     * Cerebellar stroke, acute    69 yo F with acute left cerebellar stroke    - Pt is neurologically stable and symptoms are improving  - MRI Brain on 12/4 shows 4th ventricle is patent without hydrocephalus  - No neurosurgical interventions indicated at this time  - EEG  12/6 normal  - MRA Brain & Neck 12/7 with abrupt termination of the distal left superior cerebellar artery and remainder of vasculature without high-grade stenosis or vessel occlusion.   - ASA and plavix, statin  - PT/OT rec's rehab  - Care coordination per primary team  - Will follow, please call with questions or change in neurologic status           Feliz Cazares,   Neurosurgery  [facility]

## 2018-12-09 NOTE — PLAN OF CARE
Problem: Patient Care Overview  Goal: Plan of Care Review  Outcome: Ongoing (interventions implemented as appropriate)  Pt received approx 15:50, vss  at bedside, ..POC reviewed with pt and familyAll questions and concerns addressed. Pt and family verbalized understanding. Vital signs stable and Neuro assessment remains unchanged. Pt progressing towards goals. Individualized stroke book in view at bedside. For more info see flowsheets. Will continue to monitor.

## 2018-12-09 NOTE — PROGRESS NOTES
[facility]  Vascular Neurology  Comprehensive Stroke Center  Progress Note    Assessment/Plan:     * Cerebellar stroke, acute    67 y/o female with left cerebellar stroke acute infarct in the left superior cerebellar artery with small lacunar infarcts in the posterior cerebellum. There is also an area of  nonenhancing heterogenous signal intensity located in the right posterior occipital lobe and posterior parietal lobe. There is questionable seizure like activity in patient, thus patient has underwent a 24 hr EEG which shows no seizure like activity. Loop recorder showed no cardiac events after patient had stroke.      Antithrombotics: aspirin 81 mg daily,clopidogrel 75 mg    Statins: Lipitor 40 mg daily    Aggressive risk factor modification: HTN, DM     Rehab efforts: PT/OT/SLP to evaluate and treat- as per rehab patient to be discharged to rehab facility. We are waiting for the insurance approval     Diagnostics ordered/pending: MRA of neck and brain negative for significant stenosis, TTE ordered and reported    VTE prophylaxis: Heparin 5000 units SQ every 8 hours, SCD's    BP parameters: Infarct: No intervention, SBP <220         Diabetes type 2, controlled    Stroke risk factor  HgB A1C 5.9   SSI     Acquired hypothyroidism    Stroke risk factor  Home levothyroxine  TSH- 5.218, Free T4  - Once patient is discharged please follow up with PCP to discuss increasing levothyroxine. Elevation of TSH with normal T4 can be seen in subclinical hypothyroidism. Patient already takes levothyroxine at home. Can follow up with PCP as outpatient     Hypertension, essential    Stroke risk factor. Patient is on lisinopril 5 mg   SBP <220          67 y/o F admitted to vascular neurology because of an acute infarct in the left superficial cerebellar artery. There is small lacunal infarct in the posterior cerebellum and nonspecific signal intensity located in the right posterior occipital lobe and posterior  parietal lobe (mass versus infarct). As patient was having questionable prior seizure like activity in the past, she was also put on an 24 hour video EEG which did not show any seizure like activity. As per NSGY patient does not need surgical intervention at this point. PT/OT recommends disposition to inpatient rehab. As per interrogation of the loop recorder, patient did not have any cardiac events after the stroke except for atrial tachycardia.    STROKE DOCUMENTATION        NIH Scale:          Modified [place] Score: 0  [place] Coma Scale:    ABCD2 Score:    [id number] Score:   HAS -BLED Score:   ICH Score:   Hunt [employer] Sun Classification:      Hemorrhagic change of an Ischemic Stroke: Does this patient have an ischemic stroke with hemorrhagic changes? No     Neurologic Chief Complaint: Patient presents with dizziness and nausea/vomiting found to have acute infarct in the left superior cerebellar artery with small lacunar infarcts in the posterior cerebellum.     Subjective:     Interval History: Patient is seen for follow-up neurological assessment and treatment recommendations: left cerebellar stroke currently on DAPT and statin. Overnight patient was afebrile. BP ranging between 123//76. 24 hour EEG results showed no seizure like activity. PT/OT has stated that the patient will benefit from inpatient rehab which she is scheduled to go Monday or Tuesday.    Patient has been complaining of left sided deficits since this recent stroke. She mentions that at times she will accidentally hit the left side of her face without knowing. Will continue to monitor.    Strength 5/5 in both upper and lower extremities. Patient extremely irritable today.      HPI, Past Medical, Family, and Social History remains the same as documented in the initial encounter.     Review of Systems   Constitutional: Negative for activity change, fatigue and fever.   Cardiovascular: Negative for chest pain, palpitations and leg swelling.    Gastrointestinal: Negative for abdominal distention and abdominal pain.   Genitourinary: Negative for hematuria and urgency.   Neurological: Positive for dizziness and weakness. Negative for tremors, facial asymmetry, light-headedness and headaches.     Scheduled Meds:   aspirin  81 mg Oral Daily    atorvastatin  40 mg Oral Daily    carvedilol  6.25 mg Oral BID WM    clopidogrel  75 mg Oral Daily    DULoxetine  60 mg Oral Daily    gabapentin  300 mg Oral BID    heparin (porcine)  5,000 Units Subcutaneous Q8H    lacosamide  150 mg Oral Daily    levothyroxine  100 mcg Oral Before breakfast    lisinopril  5 mg Oral Daily    multivit-iron-FA-calcium-mins  1 tablet Oral Daily    sodium chloride 0.9%  3 mL Intravenous Q8H     Continuous Infusions:   sodium chloride 0.9%       PRN Meds:acetaminophen, amitriptyline, dextrose 50%, dextrose 50%, diphenhydrAMINE, glucagon (human recombinant), glucose, glucose, influenza, insulin aspart U-100, ondansetron, pneumoc 13-devora conj-dip cr(PF), polyethylene glycol, sodium chloride 0.9%    Objective:     Vital Signs (Most Recent):  Temp: 96.6 °F (35.9 °C) (12/08/18 2332)  Pulse: 75 (12/09/18 0359)  Resp: 18 (12/08/18 2015)  BP: 130/64 (12/09/18 0359)  SpO2: (!) 89 % (12/09/18 0359)  BP Location: Left arm    Vital Signs Range (Last 24H):  Temp:  [96.6 °F (35.9 °C)-98.5 °F (36.9 °C)]   Pulse:  [69-77]   Resp:  [16-18]   BP: (126-164)/(61-76)   SpO2:  [89 %-96 %]   BP Location: Left arm    Physical Exam   Constitutional: She is oriented to person, place, and time. She appears well-developed and well-nourished.   HENT:   Head: Normocephalic and atraumatic.   Eyes: EOM are normal. Pupils are equal, round, and reactive to light.   Neck: Normal range of motion.   Cardiovascular: Normal rate.   Pulmonary/Chest: Effort normal.   Abdominal: Soft.   Musculoskeletal: Normal range of motion.   Neurological: She is alert and oriented to person, place, and time.   Skin: Skin is warm  and dry.   Nursing note and vitals reviewed.      Neurological Exam:   LOC: alert  Language: No aphasia  EOM (CN III, IV, VI): Full/intact  Pupils (CN II, III): PERRL  Reflexes: 2+ throughout  Motor: Arm right  Paresis: 4/5  Leg right Paresis: 4/5  Cebellar: Upper Extremity Appendicular Ataxia (Finger Nose Finger)  Left  Tone: Normal tone throughout    Laboratory:  CMP:   Recent Labs   Lab 12/09/18  0548   CALCIUM 10.1   ALBUMIN 3.7   PROT 6.9      K 4.5   CO2 29      BUN 16   CREATININE 0.8   ALKPHOS 115   ALT 48*   AST 56*   BILITOT 0.6     BMP:   Recent Labs   Lab 12/09/18  0548      K 4.5      CO2 29   BUN 16   CREATININE 0.8   CALCIUM 10.1     CBC:   Recent Labs   Lab 12/09/18  0548   WBC 8.07   RBC 4.40   HGB 14.0   HCT 43.1      MCV 98   MCH 31.8*   MCHC 32.5       Diagnostic Results     Brain Imaging       Geographic area T2/FLAIR signal hyperintensity with diffusion restriction and low signal on ADC without enhancement likely corresponds to an acute infarct in the distribution of the left superficial cerebellar artery.  Also there are small remote lacunar-type infarcts posterior cerebellum bilaterally.    Additionally there is a nonspecific area of nonenhancing heterogenous signal intensity located in the right posterior occipital lobe and posterior parietal lobe.    In the right frontal lobe there is a nonenhancing predominantly T1 and T2 FLAIR hypointense area measuring 3.4 x 1.9 cm which demonstrates surrounding increased T2 FLAIR signal in the surrounding white matter without mass effect.  No midline shift. No hemorrhage seen.  No evidence of hydrocephalus.  No extra-axial fluid collections.  Visualized major vascular flow voids are preserved.  Bone marrow signal intensity appears normal.    Vessel Imaging   MRA Neck and Brain  -Abrupt termination of the distal left superior cerebellar artery.  -The remainder of the cervical and intracranial vasculature appears within  normal limits, allowing for motion.  No high-grade stenosis or large vessel occlusion identified elsewhere.  Cardiac Imaging   · The aortic root is mildly dilated, measuring 3.8 cm.  · Normal left ventricular systolic function. The estimated ejection fraction is 65%  · Normal right ventricular systolic function.  · No wall motion abnormalities.  · Indeterminate left ventricular diastolic function.  · Normal central venous pressure (3 mm Hg).  · The estimated PA systolic pressure is 20.47 mm Hg  · Trivial pericardial effusion.      Ian Ramachandran MD  Comprehensive Stroke Center  Department of Vascular Neurology   [facility]

## 2018-12-09 NOTE — ASSESSMENT & PLAN NOTE
67 y/o female with left cerebellar stroke acute infarct in the left superior cerebellar artery with small lacunar infarcts in the posterior cerebellum. There is also an area of  nonenhancing heterogenous signal intensity located in the right posterior occipital lobe and posterior parietal lobe. There is questionable seizure like activity in patient, thus patient has underwent a 24 hr EEG which shows no seizure like activity. Loop recorder showed no cardiac events after patient had stroke.      Antithrombotics: aspirin 81 mg daily,clopidogrel 75 mg    Statins: Lipitor 40 mg daily    Aggressive risk factor modification: HTN, DM     Rehab efforts: PT/OT/SLP to evaluate and treat- as per rehab patient to be discharged to rehab facility. We are waiting for the insurance approval     Diagnostics ordered/pending: MRA of neck and brain negative for significant stenosis, TTE ordered and reported    VTE prophylaxis: Heparin 5000 units SQ every 8 hours, SCD's    BP parameters: Infarct: No intervention, SBP <220

## 2018-12-10 VITALS
DIASTOLIC BLOOD PRESSURE: 67 MMHG | SYSTOLIC BLOOD PRESSURE: 145 MMHG | HEART RATE: 83 BPM | RESPIRATION RATE: 18 BRPM | TEMPERATURE: 98 F | OXYGEN SATURATION: 97 % | WEIGHT: 198.63 LBS | BODY MASS INDEX: 33.91 KG/M2 | HEIGHT: 64 IN

## 2018-12-10 LAB — POCT GLUCOSE: 131 MG/DL (ref 70–110)

## 2018-12-10 PROCEDURE — G8988 SELF CARE GOAL STATUS: HCPCS | Mod: CJ

## 2018-12-10 PROCEDURE — 97535 SELF CARE MNGMENT TRAINING: CPT

## 2018-12-10 PROCEDURE — 90471 IMMUNIZATION ADMIN: CPT | Performed by: PSYCHIATRY & NEUROLOGY

## 2018-12-10 PROCEDURE — 3E02340 INTRODUCTION OF INFLUENZA VACCINE INTO MUSCLE, PERCUTANEOUS APPROACH: ICD-10-PCS | Performed by: PSYCHIATRY & NEUROLOGY

## 2018-12-10 PROCEDURE — 92526 ORAL FUNCTION THERAPY: CPT

## 2018-12-10 PROCEDURE — 25000003 PHARM REV CODE 250: Performed by: NURSE PRACTITIONER

## 2018-12-10 PROCEDURE — 90662 IIV NO PRSV INCREASED AG IM: CPT | Performed by: PSYCHIATRY & NEUROLOGY

## 2018-12-10 PROCEDURE — 63600175 PHARM REV CODE 636 W HCPCS: Performed by: NURSE PRACTITIONER

## 2018-12-10 PROCEDURE — 99239 HOSP IP/OBS DSCHRG MGMT >30: CPT | Mod: ,,, | Performed by: PSYCHIATRY & NEUROLOGY

## 2018-12-10 PROCEDURE — G8989 SELF CARE D/C STATUS: HCPCS | Mod: CK

## 2018-12-10 PROCEDURE — G0008 ADMIN INFLUENZA VIRUS VAC: HCPCS | Performed by: PSYCHIATRY & NEUROLOGY

## 2018-12-10 PROCEDURE — 63600175 PHARM REV CODE 636 W HCPCS: Performed by: PSYCHIATRY & NEUROLOGY

## 2018-12-10 PROCEDURE — 25000003 PHARM REV CODE 250: Performed by: STUDENT IN AN ORGANIZED HEALTH CARE EDUCATION/TRAINING PROGRAM

## 2018-12-10 PROCEDURE — 99231 SBSQ HOSP IP/OBS SF/LOW 25: CPT | Mod: ,,, | Performed by: PHYSICIAN ASSISTANT

## 2018-12-10 RX ORDER — CLOPIDOGREL BISULFATE 75 MG/1
75 TABLET ORAL DAILY
Qty: 30 TABLET | Refills: 11
Start: 2018-12-11 | End: 2020-02-20

## 2018-12-10 RX ORDER — LEVOTHYROXINE SODIUM 100 UG/1
100 TABLET ORAL
Qty: 30 TABLET | Refills: 11 | Status: SHIPPED | OUTPATIENT
Start: 2018-12-11 | End: 2019-05-07 | Stop reason: SDUPTHER

## 2018-12-10 RX ADMIN — MULTIPLE VITAMINS W/ MINERALS TAB 1 TABLET: TAB at 09:12

## 2018-12-10 RX ADMIN — LACOSAMIDE 150 MG: 50 TABLET, FILM COATED ORAL at 09:12

## 2018-12-10 RX ADMIN — ASPIRIN 81 MG: 81 TABLET, COATED ORAL at 09:12

## 2018-12-10 RX ADMIN — CARVEDILOL 6.25 MG: 6.25 TABLET, FILM COATED ORAL at 09:12

## 2018-12-10 RX ADMIN — LEVOTHYROXINE SODIUM 100 MCG: 100 TABLET ORAL at 05:12

## 2018-12-10 RX ADMIN — INFLUENZA A VIRUS A/MICHIGAN/45/2015 X-275 (H1N1) ANTIGEN (FORMALDEHYDE INACTIVATED), INFLUENZA A VIRUS A/SINGAPORE/INFIMH-16-0019/2016 IVR-186 (H3N2) ANTIGEN (FORMALDEHYDE INACTIVATED), AND INFLUENZA B VIRUS B/MARYLAND/15/2016 BX-69A (A B/COLORADO/6/2017-LIKE VIRUS) ANTIGEN (FORMALDEHYDE INACTIVATED) 0.5 ML: 60; 60; 60 INJECTION, SUSPENSION INTRAMUSCULAR at 12:12

## 2018-12-10 RX ADMIN — ATORVASTATIN CALCIUM 40 MG: 20 TABLET, FILM COATED ORAL at 09:12

## 2018-12-10 RX ADMIN — GABAPENTIN 300 MG: 300 CAPSULE ORAL at 09:12

## 2018-12-10 RX ADMIN — HEPARIN SODIUM 5000 UNITS: 5000 INJECTION, SOLUTION INTRAVENOUS; SUBCUTANEOUS at 05:12

## 2018-12-10 RX ADMIN — DULOXETINE 60 MG: 30 CAPSULE, DELAYED RELEASE ORAL at 09:12

## 2018-12-10 RX ADMIN — CLOPIDOGREL 75 MG: 75 TABLET, FILM COATED ORAL at 09:12

## 2018-12-10 RX ADMIN — LISINOPRIL 5 MG: 5 TABLET ORAL at 09:12

## 2018-12-10 NOTE — PROGRESS NOTES
" [facility]  Adult Nutrition  Progress Note     SUMMARY       Recommendations  Recommendation/Intervention:   1. Continue diet order as tolerated   2. Encourage good po intake   3. Dietitian Following    Goals: Patient to meet >85% of EEN/EPN  Nutrition Goal Status: progressing towards goal  Communication of RD Recs: (POC)    Reason for Assessment  Reason For Assessment: RD follow-up  Diagnosis: stroke/CVA(Cerebellar stroke, acute )  Relevant Medical History: prior stroke, depression, DM2, HTN, neuropathy, hypothyroidism  Interdisciplinary Rounds: did not attend  General Information Comments: Patient with tolerating Diabetic diet with good po intake. Denies N/V. Continues to appear nourished.    Nutrition Discharge Planning: Adequate nutrition    Nutrition/Diet History  Food Preferences: Noted  Spiritual, Cultural Beliefs, Evangelical Practices, Values that Affect Care: (none)  Food Allergies: NKFA  Factors Affecting Nutritional Intake: None identified at this time    Anthropometrics  Temp: 97.9 °F (36.6 °C)  Height: 5' 3.5" (161.3 cm)  Height (inches): 63.5 in  Weight Method: Bed Scale  Weight: 90.1 kg (198 lb 10.2 oz)  Weight (lb): 198.64 lb  Ideal Body Weight (IBW), Female: 117.5 lb  % Ideal Body Weight, Female (lb): 169.57 lb  BMI (Calculated): 34.6     Lab/Procedures/Meds  Labs: Reviewed    12/09/2018    K 4.5 12/09/2018     12/09/2018    CO2 29 12/09/2018    BUN 16 12/09/2018    CREATININE 0.8 12/09/2018    CALCIUM 10.1 12/09/2018    PHOS 4.2 12/09/2018    MG 2.3 12/05/2018    ALBUMIN 3.7 12/09/2018     Meds: Reviewed  Scheduled Meds:   aspirin  81 mg Oral Daily    atorvastatin  40 mg Oral Daily    carvedilol  6.25 mg Oral BID WM    clopidogrel  75 mg Oral Daily    DULoxetine  60 mg Oral Daily    gabapentin  300 mg Oral BID    heparin (porcine)  5,000 Units Subcutaneous Q8H    lacosamide  150 mg Oral Daily    levothyroxine  100 mcg Oral Before breakfast    lisinopril  5 " mg Oral Daily    multivit-iron-FA-calcium-mins  1 tablet Oral Daily    sodium chloride 0.9%  3 mL Intravenous Q8H     Physical Findings/Assessment  Overall Physical Appearance: nourished, overweight    Estimated/Assessed Needs  Weight Used For Calorie Calculations: 90.1 kg (198 lb 10.2 oz)  Energy Calorie Requirements (kcal): 1760(PAL 1.25)  Energy Need Method: [place]-St Jeor  Protein Requirements: (1.0-1.2 gm/kg)  Weight Used For Protein Calculations: 90.1 kg (198 lb 10.2 oz)  Fluid Requirements (mL): 1mL/kcal or per MD  Estimated Fluid Requirement Method: RDA Method(1mL/kcal or per MD)  RDA Method (mL): 1760  Estimated Fiber Requirements: 50% total kcal    Nutrition Prescription Ordered  Current Diet Order: Regular  Nutrition Order Comments: Diabetic 2000 ADA    Evaluation of Received Nutrient/Fluid Intake in last 24h  I/O: No I/O Recorded  Comments: No BM Recorded  % Intake of Estimated Energy Needs: 75 - 100 %  % Meal Intake: 75 - 100 %    Nutrition Risk  Level of Risk/Frequency of Follow-up: low(1x week)     Assessment and Plan  No Nutrition Diagnosis at this time.    Monitor and Evaluation  Food and Nutrient Intake: energy intake, food and beverage intake  Food and Nutrient Adminstration: diet order  Anthropometric Measurements: weight, weight change, body mass index  Biochemical Data, Medical Tests and Procedures: electrolyte and renal panel, gastrointestinal profile, glucose/endocrine profile, inflammatory profile, lipid profile  Nutrition-Focused Physical Findings: overall appearance     Nutrition Follow-Up  RD Follow-up?: Yes

## 2018-12-10 NOTE — PROGRESS NOTES
[facility]  Vascular Neurology  Comprehensive Stroke Center  Progress Note    Assessment/Plan:     * Cerebellar stroke, acute    69 y/o female with left cerebellar stroke acute infarct in the left superior cerebellar artery with small lacunar infarcts in the posterior cerebellum. There is also an area of  nonenhancing heterogenous signal intensity located in the right posterior occipital lobe and posterior parietal lobe. There is questionable seizure like activity in patient, thus patient has underwent a 24 hr EEG which shows no seizure like activity. Patient had loop recorder implanted prior to hospital visit. Loop recorder was interrogated, no cardiac rhythm abnormalities suggested cardio embolic etiology for stroke.     Antithrombotics: aspirin 81 mg daily,clopidogrel 75 mg    Statins: Lipitor 40 mg daily    Aggressive risk factor modification: HTN, DM     Rehab efforts: PT/OT/SLP to evaluate and treat- Patient approved for rehab facility.     Diagnostics ordered/pending: MRA of neck and brain negative for significant stenosis, TTE ordered and reported    VTE prophylaxis: Heparin 5000 units SQ every 8 hours, SCD's    BP parameters: Infarct: No intervention, SBP <220         Diabetes type 2, controlled    Stroke risk factor  HgB A1C 5.9   SSI     Acquired hypothyroidism    Stroke risk factor  Home levothyroxine  TSH- 5.218, Free T4  - Once patient is discharged please follow up with PCP to discuss increasing levothyroxine. Elevation of TSH with normal T4 can be seen in subclinical hypothyroidism. Patient already takes levothyroxine at home. Can follow up with PCP as outpatient     Hypertension, essential    Stroke risk factor. Patient is on lisinopril 5 mg   SBP <220          69 y/o F admitted to vascular neurology because of an acute infarct in the left superficial cerebellar artery. There is small lacunal infarct in the posterior cerebellum and nonspecific signal intensity located in the  right posterior occipital lobe and posterior parietal lobe (mass versus infarct). As patient was having questionable prior seizure like activity in the past, she was also put on an 24 hour video EEG which did not show any seizure like activity. As per NSGY patient does not need surgical intervention at this point. PT/OT recommends disposition to inpatient rehab. Loop recorder was interrogated, no cardiac rhythm abnormalities suggested cardio embolic etiology for stroke. Patient medically ready for discharge today.     STROKE DOCUMENTATION        NIH Scale:  Interval: 7 days or at discharge (whichever comes first)  1a. Level of Consciousness: 0-->Alert, keenly responsive  1b. LOC Questions: 0-->Answers both questions correctly  1c. LOC Commands: 0-->Performs both tasks correctly  2. Best Gaze: 0-->Normal  3. Visual: 0-->No visual loss  4. Facial Palsy: 0-->Normal symmetrical movements  5a. Motor Arm, Left: 0-->No drift, limb holds 90 (or 45) degrees for full 10 secs  5b. Motor Arm, Right: 0-->No drift, limb holds 90 (or 45) degrees for full 10 secs  6a. Motor Leg, Left: 0-->No drift, leg holds 30 degree position for full 5 secs  6b. Motor Leg, Right: 0-->No drift, leg holds 30 degree position for full 5 secs  7. Limb Ataxia: 1-->Present in one limb  8. Sensory: 0-->Normal, no sensory loss  9. Best Language: 0-->No aphasia, normal  10. Dysarthria: 0-->Normal  11. Extinction and Inattention (formerly Neglect): 0-->No abnormality  Total (NIH Stroke Scale): 1       Modified [place] Score: 3  [place] Coma Scale:15   ABCD2 Score:    [id number] Score:   HAS -BLED Score:   ICH Score:   Hunt & [place] Classification:      Hemorrhagic change of an Ischemic Stroke: Does this patient have an ischemic stroke with hemorrhagic changes? No     Neurologic Chief Complaint: Cerebellar stroke, acute    Subjective:     Interval History: Patient is seen for follow-up neurological assessment and treatment recommendations:  left cerebellar  stroke currently on DAPT and statin. Overnight patient was afebrile. BP ranging between 124//68. 24 hour EEG results showed no seizure like activity. PT/OT has stated that the patient will benefit from inpatient rehab, she will be discharged today.    Strength 5/5 in both upper and lower extremities. Patient had no significant complaints today.     HPI, Past Medical, Family, and Social History remains the same as documented in the initial encounter.     Review of Systems   Constitutional: Negative for fever.   Respiratory: Negative for chest tightness and shortness of breath.    Cardiovascular: Negative for chest pain and leg swelling.   Gastrointestinal: Negative for nausea and vomiting.   Neurological: Positive for weakness (left arm). Negative for dizziness and headaches.   Psychiatric/Behavioral: Negative for agitation, behavioral problems and confusion.     Neurologic Chief Complaint: Patient presented with dizziness and nausea/vomiting found to have acute infarct in the left superior cerebellar artery with small lacunar infarcts in the posterior cerebellum.       Scheduled Meds:   aspirin  81 mg Oral Daily    atorvastatin  40 mg Oral Daily    carvedilol  6.25 mg Oral BID WM    clopidogrel  75 mg Oral Daily    DULoxetine  60 mg Oral Daily    gabapentin  300 mg Oral BID    heparin (porcine)  5,000 Units Subcutaneous Q8H    lacosamide  150 mg Oral Daily    levothyroxine  100 mcg Oral Before breakfast    lisinopril  5 mg Oral Daily    multivit-iron-FA-calcium-mins  1 tablet Oral Daily    sodium chloride 0.9%  3 mL Intravenous Q8H     Continuous Infusions:   sodium chloride 0.9%       PRN Meds:acetaminophen, amitriptyline, dextrose 50%, dextrose 50%, diphenhydrAMINE, glucagon (human recombinant), glucose, glucose, insulin aspart U-100, ondansetron, pneumoc 13-devora conj-dip cr(PF), polyethylene glycol, sodium chloride 0.9%    Objective:     Vital Signs (Most Recent):  BP (!) 145/67 (BP Location:  "Left arm, Patient Position: Sitting)   Pulse 83   Temp 97.5 °F (36.4 °C) (Oral)   Resp 18   Ht 5' 3.5" (1.613 m)   Wt 90.1 kg (198 lb 10.2 oz)   LMP 04/18/2016   SpO2 97%   Breastfeeding? No   BMI 34.63 kg/m²      Vital Signs Range (Last 24H):  Temp:  [95.5 °F (35.3 °C)-97.9 °F (36.6 °C)]   Pulse:  [69-83]   Resp:  [18-20]   BP: (124-163)/(66-70)   SpO2:  [91 %-97 %]   BP Location: Left arm    Physical Exam   Constitutional: She is oriented to person, place, and time. She appears well-developed and well-nourished.   HENT:   Head: Normocephalic and atraumatic.   Eyes: EOM are normal. Pupils are equal, round, and reactive to light.   Neck: Normal range of motion.   Cardiovascular: Normal rate.   Pulmonary/Chest: Effort normal.   Abdominal: Soft.   Musculoskeletal: Normal range of motion.   Neurological: She is alert and oriented to person, place, and time.   Skin: Skin is warm and dry.   Nursing note and vitals reviewed.      Neurological Exam:   LOC: alert  Language: No aphasia  EOM (CN III, IV, VI): Full/intact  Pupils (CN II, III): PERRL  Reflexes: 2+ throughout  Motor: Arm right  Paresis: 5/5  Leg right Paresis: 5/5  Cebellar: Upper Extremity Appendicular Ataxia (Finger Nose Finger)  Left  Tone: Normal tone throughout    Laboratory:  CMP:   No results for input(s): GLUCOSE, CALCIUM, ALBUMIN, PROT, NA, K, CO2, CL, BUN, CREATININE, ALKPHOS, ALT, AST, BILITOT in the last 24 hours.  BMP:   Recent Labs   Lab 12/09/18  0548      K 4.5      CO2 29   BUN 16   CREATININE 0.8   CALCIUM 10.1     CBC:   Recent Labs   Lab 12/09/18  0548   WBC 8.07   RBC 4.40   HGB 14.0   HCT 43.1      MCV 98   MCH 31.8*   MCHC 32.5       Diagnostic Results     Brain Imaging       Geographic area T2/FLAIR signal hyperintensity with diffusion restriction and low signal on ADC without enhancement likely corresponds to an acute infarct in the distribution of the left superrior cerebellar artery.  Also there are small " remote lacunar-type infarcts posterior cerebellum bilaterally.    Additionally there is a nonspecific area of nonenhancing heterogenous signal intensity located in the right posterior occipital lobe and posterior parietal lobe.    In the right frontal lobe there is a nonenhancing predominantly T1 and T2 FLAIR hypointense area measuring 3.4 x 1.9 cm which demonstrates surrounding increased T2 FLAIR signal in the surrounding white matter without mass effect.  No midline shift. No hemorrhage seen.  No evidence of hydrocephalus.  No extra-axial fluid collections.  Visualized major vascular flow voids are preserved.  Bone marrow signal intensity appears normal.    Vessel Imaging   MRA Neck and Brain  -Abrupt termination of the distal left superior cerebellar artery.  -The remainder of the cervical and intracranial vasculature appears within normal limits, allowing for motion.  No high-grade stenosis or large vessel occlusion identified elsewhere.  Cardiac Imaging   · The aortic root is mildly dilated, measuring 3.8 cm.  · Normal left ventricular systolic function. The estimated ejection fraction is 65%  · Normal right ventricular systolic function.  · No wall motion abnormalities.  · Indeterminate left ventricular diastolic function.  · Normal central venous pressure (3 mm Hg).  · The estimated PA systolic pressure is 20.47 mm Hg  · Trivial pericardial effusion.          Halley Alfred NP  [facility]  Department of Vascular Neurology   [facility]

## 2018-12-10 NOTE — ASSESSMENT & PLAN NOTE
67 yo F with acute left cerebellar stroke    - Patient remains neurologically stable on exam  - MRA Brain & Neck 12/7 with abrupt termination of the distal left superior cerebellar artery and remainder of vasculature without high-grade stenosis or vessel occlusion.   - Continue ASA, plavix, and statin per Vascular Neurology  - Patient without symptoms of obstructive hydrocephalus but will get one final HCT today to ensure the 4th ventricle remains open.   - Continue care coordination per primary team  - No outpatient neurosurgery follow up indicated unless there are detrimental findings on the head CT

## 2018-12-10 NOTE — PLAN OF CARE
Problem: Patient Care Overview  Goal: Plan of Care Review  Outcome: Ongoing (interventions implemented as appropriate)  Pt tolerated meds and meals, daughter at bedside ,  told pt she could take a shower today, pt is a fall risk , nurse informed pt she cannot shower only bed bath at present due to high fall risk as she is unsteady. Pt had bed bath today..POC reviewed with pt and family  All questions and concerns addressed. Pt and family verbalized understanding. Vital signs stable and Neuro assessment remains unchanged. Pt progressing towards goals. . For more info see flowsheets. Will continue to monitor.

## 2018-12-10 NOTE — DISCHARGE SUMMARY
[facility]  Vascular Neurology  Comprehensive Stroke Center  Discharge Summary     Summary:     Admit Date: 12/4/2018  8:12 PM    Discharge Date and Time:  12/10/2018 3:02 PM    Attending Physician: Dale Lopez MD    Discharge Provider: Halley Alfred NP    History of Present Illness: 67 y/o female with prior stroke who had hx of dizziness but last night it started to get worse and has nausea and vomiting. This morning it seemed to have gotten worse so she was taken to [facility] and CT scan showed possible brain mass so call was placed to neurosurgery and patient was transferred for neurosurgery evaluation. Upon arrival MRI Brain w w/o contrast done and acute infarct  in  Left cerebellum seen. Patient reported nausea and dizziness. Patient has also been have episodes of falling and not knowing why, she has seen neurologist to get checked out for seizure activity and was placed on Vimpat.     Risk factors HTN, DM, prior stroke  Patient has had a loop recorder installed s/p PFO closure.  Loop recorder interrogated during hospital stay, report reveal no cardiac rhythm abnormalities to suggest cardio embolic etiology for stroke.    Hospital Course (synopsis of major diagnoses, care, treatment, and services provided during the course of the hospital stay): 67 y/o F admitted to vascular neurology because of an acute infarct in the left superior cerebellar artery. There is small lacunal infarct in the posterior cerebellum and nonspecific signal intensity located in the right posterior occipital lobe and posterior parietal lobe (mass versus infarct). As patient was having questionable prior seizure like activity in the past, she was also put on an 24 hour video EEG which did not show any seizure like activity. As per NSGY patient does not need surgical intervention at this point. PT/OT recommends disposition to inpatient rehab. As per interrogation of the loop recorder, patient did not  have any cardiac events prior to current stroke to suggest cardio embolic etiology at this time. Patient medically ready for discharge today. Etiology of stroke remains unclear. Recommending hypercoagulability work up to be completed at neurovascular follow up appointment.     Stroke Etiology: Undetermined Cause. Unclassified due to presence of more than or equal to 1 Probable Major Cause (Other/Uncommon Cause)    STROKE DOCUMENTATION         NIH Scale:  Interval: 7 days or at discharge (whichever comes first)  1a. Level of Consciousness: 0-->Alert, keenly responsive  1b. LOC Questions: 0-->Answers both questions correctly  1c. LOC Commands: 0-->Performs both tasks correctly  2. Best Gaze: 0-->Normal  3. Visual: 0-->No visual loss  4. Facial Palsy: 0-->Normal symmetrical movements  5a. Motor Arm, Left: 0-->No drift, limb holds 90 (or 45) degrees for full 10 secs  5b. Motor Arm, Right: 0-->No drift, limb holds 90 (or 45) degrees for full 10 secs  6a. Motor Leg, Left: 0-->No drift, leg holds 30 degree position for full 5 secs  6b. Motor Leg, Right: 0-->No drift, leg holds 30 degree position for full 5 secs  7. Limb Ataxia: 1-->Present in one limb  8. Sensory: 0-->Normal, no sensory loss  9. Best Language: 0-->No aphasia, normal  10. Dysarthria: 0-->Normal  11. Extinction and Inattention (formerly Neglect): 0-->No abnormality  Total (NIH Stroke Scale): 1        Modified Door Score: 3  [place] Coma Scale:15   ABCD2 Score:    [id number] Score:   HAS -BLED Score:   ICH Score:   Hunt & Sun Classification:       Assessment/Plan:     Diagnostic Results:   Please see previous not on the same date.     Interventions: None    Complications: None    Disposition: Rehab Facility    Final Active Diagnoses:    Diagnosis Date Noted POA    PRINCIPAL PROBLEM:  Cerebellar stroke, acute [I63.9] 12/04/2018 Yes    Hypertension, essential [I10] 04/17/2016 Yes    Acquired hypothyroidism [E03.9] 04/17/2016 Yes    Diabetes type 2,  controlled [E11.9] 04/17/2016 Yes      Problems Resolved During this Admission:     * Cerebellar stroke, acute    69 y/o female with left cerebellar stroke acute infarct in the left superior cerebellar artery with small lacunar infarcts in the posterior cerebellum. There is also an area of  nonenhancing heterogenous signal intensity located in the right posterior occipital lobe and posterior parietal lobe. There is questionable seizure like activity in patient, thus patient has underwent a 24 hr EEG which shows no seizure like activity. Loop recorder showed no cardiac events after patient had stroke.      Antithrombotics: aspirin 81 mg daily,clopidogrel 75 mg    Statins: Lipitor 40 mg daily    Aggressive risk factor modification: HTN, DM     Rehab efforts: PT/OT/SLP to evaluate and treat- Patient approved for rehab facility.     Diagnostics ordered/pending: MRA of neck and brain negative for significant stenosis, TTE ordered and reported    VTE prophylaxis: Heparin 5000 units SQ every 8 hours, SCD's    BP parameters: Infarct: No intervention, SBP <220         Diabetes type 2, controlled    Stroke risk factor  HgB A1C 5.9   SSI     Acquired hypothyroidism    Stroke risk factor  Home levothyroxine  TSH- 5.218, Free T4  - Once patient is discharged please follow up with PCP to discuss increasing levothyroxine. Elevation of TSH with normal T4 can be seen in subclinical hypothyroidism. Patient already takes levothyroxine at home. Can follow up with PCP as outpatient     Hypertension, essential    Stroke risk factor. Patient is on lisinopril 5 mg   SBP <220         Recommendations:     Post-discharge complication risks: Falls    Stroke Education given to: patient    Follow-up in Stroke Clinic in 14 days.     Discharge Plan:  Antithrombotics: Aspirin 81mg  Statin: Atorvastatin 40 mg  Aggresive risk factor modification:  Diet  Exercise    Follow Up:  Follow-up Information     [facility] In 4 weeks.     Specialty:  Neurology  Why:  Follow-up Hospitalization/Stroke/Office will contact you to schedule a followup appt  Contact information:  [address]  [facility] [address] [phone]  Additional information:  7th Floor                 Patient Instructions:      Call 911 for any of the following:   Order Comments: Call 911  right away if any of the following warning signs come on suddenly, even if the symptoms only last for a few minutes. With stroke, timing is very important.   - Warning Signs of Stroke:  - Weakness: You may feel a sudden weakness, tingling or loss of feeling on one side of your face or body.  - Vision Problems: You may have sudden double vision or trouble seeing in one or both eyes.  - Speech Problems: You may have sudden trouble talking, slured speech, or problems understanding others.  - Headache: You may have sudden, severe headache.  - Movement Problems: You may experience dizziness, a feeling of spinning, a loss of balance, a feeling of falling or blackouts.       Medications:  Reconciled Home Medications:      Medication List      START taking these medications    clopidogrel 75 mg tablet  Commonly known as:  PLAVIX  Take 1 tablet (75 mg total) by mouth once daily.  Start taking on:  12/11/2018        CHANGE how you take these medications    levothyroxine 100 MCG tablet  Commonly known as:  SYNTHROID  Take 1 tablet (100 mcg total) by mouth before breakfast.  Start taking on:  12/11/2018  What changed:    · medication strength  · how much to take  · when to take this        CONTINUE taking these medications    amitriptyline 25 MG tablet  Commonly known as:  ELAVIL  Take 25 mg by mouth nightly as needed for Insomnia.     aspirin 81 MG EC tablet  Commonly known as:  ECOTRIN  Take 81 mg by mouth once daily.     atorvastatin 40 MG tablet  Commonly known as:  LIPITOR  Take 40 mg by mouth once daily.     carvedilol 6.25 MG tablet  Commonly known as:  COREG  Take 6.25 mg by  mouth 2 (two) times daily with meals.     CENTRUM 3,500-18-0.4 unit-mg-mg Chew  Generic drug:  multivit-iron-min-folic acid  Chew 1 tablet daily     DULoxetine 60 MG capsule  Commonly known as:  CYMBALTA  Take 1 capsule (60 mg total) by mouth once daily.     gabapentin 100 MG capsule  Commonly known as:  NEURONTIN  Take 300 mg by mouth 2 (two) times daily.     glimepiride 2 MG tablet  Commonly known as:  AMARYL  Take 2 mg by mouth before breakfast.     glucosamine-chondroitin 500-400 mg tablet  Take 1 tablet by mouth 3 (three) times daily.     lisinopril 5 MG tablet  Commonly known as:  PRINIVIL,ZESTRIL  Take 5 mg by mouth once daily.     MEDI-MECLIZINE 25 mg tablet  Generic drug:  meclizine  Take 25 mg by mouth 3 (three) times daily as needed for Dizziness.     omeprazole 40 MG capsule  Commonly known as:  PRILOSEC  Take 40 mg by mouth once daily.     pioglitazone 15 MG tablet  Commonly known as:  ACTOS  Take 15 mg by mouth once daily.     VIMPAT 150 mg Tab  Generic drug:  lacosamide  Take 150 mg by mouth once daily.        STOP taking these medications    JARDIANCE 25 mg Tab  Generic drug:  empagliflozin     pregabalin 75 MG capsule  Commonly known as:  NYASIA Alfred NP  [facility]  Department of Vascular Neurology   [facility]

## 2018-12-10 NOTE — PLAN OF CARE
Problem: Patient Care Overview  Goal: Plan of Care Review  Discharge orders given, daughter transporting to Rehab facility. PIV removed.

## 2018-12-10 NOTE — PLAN OF CARE
[facility]    FACILITY TRANSFER ORDERS      Patient Name: Missy Ennis  YOB: 1950    PCP: Dale Valenzuela MD   PCP Address: [address] [place] [address] / [place] [address]  PCP Phone Number: [phone]  PCP Fax: [phone]    Encounter Date: 12/10/2018    Admit to: [facility]    Vital Signs:  Routine    Diagnoses:   Active Hospital Problems    Diagnosis  POA    *Cerebellar stroke, acute [I63.9]  Yes    Hypertension, essential [I10]  Yes    Acquired hypothyroidism [E03.9]  Yes    Diabetes type 2, controlled [E11.9]  Yes      Resolved Hospital Problems   No resolved problems to display.       Allergies:  Review of patient's allergies indicates:   Allergen Reactions    Codeine Itching    Morphine Other (See Comments)     Hallucinations    Sulfa (sulfonamide antibiotics) Itching       Diet: diabetic diet: 2000 calorie    Activities: Up with assistance    Nursing: Per facility protocol     Labs: Per facility protocol    CONSULTS:    Physical Therapy to evaluate and treat. , Occupational Therapy to evaluate and treat., Speech Therapy to evaluate and treat for Language and Cognition. and  to evaluate for community resources/long-range planning.    MISCELLANEOUS CARE:  Diabetes Care:   Fingerstick blood sugar AC and HS and Report CBG < 60 or > 350 to physician.    WOUND CARE ORDERS  None    Medications: Review discharge medications with patient and family and provide education.      Current Discharge Medication List      CONTINUE these medications which have NOT CHANGED    Details   amitriptyline (ELAVIL) 25 MG tablet Take 25 mg by mouth nightly as needed for Insomnia.      aspirin (ECOTRIN) 81 MG EC tablet Take 81 mg by mouth once daily.      atorvastatin (LIPITOR) 40 MG tablet Take 40 mg by mouth once daily.      carvedilol (COREG) 6.25 MG tablet Take 6.25 mg by mouth 2 (two) times daily with meals.      duloxetine (CYMBALTA) 60 MG capsule Take 1  capsule (60 mg total) by mouth once daily.  Qty: 30 capsule, Refills: 2    Associated Diagnoses: Fibromyalgia      gabapentin (NEURONTIN) 100 MG capsule Take 300 mg by mouth 2 (two) times daily.       glimepiride (AMARYL) 2 MG tablet Take 2 mg by mouth before breakfast.      glucosamine-chondroitin 500-400 mg tablet Take 1 tablet by mouth 3 (three) times daily.      lacosamide (VIMPAT) 150 mg Tab Take 150 mg by mouth once daily.       levothyroxine (SYNTHROID) 88 MCG tablet Take 88 mcg by mouth once daily.      lisinopril (PRINIVIL,ZESTRIL) 5 MG tablet Take 5 mg by mouth once daily.      meclizine (MEDI-MECLIZINE) 25 mg tablet Take 25 mg by mouth 3 (three) times daily as needed for Dizziness.      MULTIVIT-IRON-MIN-FOLIC ACID 3,500-18-0.4 UNIT-MG-MG ORAL CHEW Chew 1 tablet daily      omeprazole (PRILOSEC) 40 MG capsule Take 40 mg by mouth once daily.      pioglitazone (ACTOS) 15 MG tablet Take 15 mg by mouth once daily.         STOP taking these medications       empagliflozin (JARDIANCE) 25 mg Tab Comments:   Reason for Stopping:         pregabalin (LYRICA) 75 MG capsule Comments:   Reason for Stopping:                    __Electronic Signature__________________________  Bessie De La Cruz NP  12/10/2018

## 2018-12-10 NOTE — PLAN OF CARE
Patient discharging today to [facility]. Patient and family in agreement with discharge plan. Nurse can call report to [phone]. Family states they are agreeable to provide transportation to rehab. Notified team and they are ok with family providing transportation. Pau with rehab has been notified as well. Instructions given to patient's daughter to call nurses' station upon arrival and they will pick patient up in a wc. Daughter verbalized understanding. Followup appts have been scheduled and AVS has been updated. Packet along with disc of images given to nurse.      12/10/18 1251   Final Note   Assessment Type Final Discharge Note   Anticipated Discharge Disposition Rehab  ([facility])   Right Care Referral Info   Post Acute Recommendation IRF

## 2018-12-10 NOTE — SUBJECTIVE & OBJECTIVE
Interval History:   NAEON. Patient resting comfortably in bed. Denies headaches, dizziness, N/V, worsening weakness, or vision changes. Continues to report difficulty with gait and balance.     Medications:  Continuous Infusions:   sodium chloride 0.9%       Scheduled Meds:   aspirin  81 mg Oral Daily    atorvastatin  40 mg Oral Daily    carvedilol  6.25 mg Oral BID WM    clopidogrel  75 mg Oral Daily    DULoxetine  60 mg Oral Daily    gabapentin  300 mg Oral BID    heparin (porcine)  5,000 Units Subcutaneous Q8H    lacosamide  150 mg Oral Daily    levothyroxine  100 mcg Oral Before breakfast    lisinopril  5 mg Oral Daily    multivit-iron-FA-calcium-mins  1 tablet Oral Daily    sodium chloride 0.9%  3 mL Intravenous Q8H     PRN Meds:acetaminophen, amitriptyline, dextrose 50%, dextrose 50%, diphenhydrAMINE, glucagon (human recombinant), glucose, glucose, influenza, insulin aspart U-100, ondansetron, pneumoc 13-devora conj-dip cr(PF), polyethylene glycol, sodium chloride 0.9%     Review of Systems  Objective:     Weight: 90.1 kg (198 lb 10.2 oz)  Body mass index is 34.63 kg/m².  Vital Signs (Most Recent):  Temp: 97.9 °F (36.6 °C) (12/10/18 0810)  Pulse: 80 (12/10/18 0810)  Resp: 18 (12/10/18 0810)  BP: 124/66 (12/10/18 0810)  SpO2: (!) 94 % (12/10/18 0810) Vital Signs (24h Range):  Temp:  [95.5 °F (35.3 °C)-97.9 °F (36.6 °C)] 97.9 °F (36.6 °C)  Pulse:  [69-80] 80  Resp:  [18-20] 18  SpO2:  [91 %-97 %] 94 %  BP: (124-163)/(66-70) 124/66        Neurosurgery Physical Exam   General: well developed, well nourished, no distress.   Head: normocephalic, atraumatic  Neurologic: Alert and oriented. Thought content appropriate.  GCS: Motor: 6/Verbal: 5/Eyes: 4 GCS Total: 15  Mental Status: Awake, Alert, Oriented x 4  Language: No aphasia  Speech: No dysarthria  Cranial nerves: face symmetric, tongue midline, CN II-XII grossly intact.   Eyes: pupils equal, round, reactive to light, EOMI.  Pulmonary: normal  respirations, no signs of respiratory distress  Sensory: intact to light touch throughout    Motor Strength: Moves all extremities spontaneously with good tone.  No abnormal movements seen.      Strength   Deltoids Triceps Biceps Wrist Extension Wrist Flexion Hand    Upper: R 5/5 5/5 5/5 5/5 5/5 5/5     L 5/5 5/5 5/5 5/5 5/5 5/5       Iliopsoas Quadriceps Knee  Flexion Tibialis  anterior Gastro- cnemius EHL   Lower: R 5/5 5/5 5/5 5/5 5/5 5/5     L 4+/5 5/5 4+/5 5-/5 5-/5 5/5      Pronator Drift: no drift noted  Finger-to-nose: mild dysmetria on left side   Clonus: absent  Babinski: absent  Skin: Skin is warm, dry and intact.          Significant Labs:  Recent Labs   Lab 12/09/18  0548   *      K 4.5      CO2 29   BUN 16   CREATININE 0.8   CALCIUM 10.1     Recent Labs   Lab 12/09/18  0548   WBC 8.07   HGB 14.0   HCT 43.1

## 2018-12-10 NOTE — PT/OT/SLP PROGRESS
Occupational Therapy   Treatment    Name: Missy Ennis  MRN: [id number]  Admitting Diagnosis:  Cerebellar stroke, acute       Recommendations:     Discharge Recommendations: rehab  Discharge Equipment Recommendations:  commode, walker, rolling  Barriers to discharge:  None    Subjective     Pain/Comfort:  · Pain Rating 1: 0/10  · Pain Rating Post-Intervention 1: 0/10    Objective:     Communicated with: RN prior to session.  Patient found seated EOB with bed alarm, peripheral IV, telemetry,  upon OT entry to room.    General Precautions: Standard, aspiration, diabetic   Orthopedic Precautions:N/A   Braces: N/A     Occupational Performance:    Functional Mobility/Transfers:  · Patient completed Sit <> Stand Transfer with stand by assistance  with  rolling walker   · Patient completed Bed <> Chair Transfer using Step Transfer technique with contact guard assistance with rolling walker  · Functional Mobility: household distance from bed<>wheelchair in hallway ~15 ft with rolling walker and CGA  · Verbal cues for sequencing with RW    Activities of Daily Living:  · Feeding:  modified independence pouring item into cup with R hand  · Lower Body Dressing: minimum assistance R LE/sock from EOB    AMPA 6 Click ADL: 18    Treatment & Education:  -Pt alert and agreeable to therapy session; reported orientation x3  -Re edu on safety and correct sequencing and height of rolling walker  -Communication board updated; questions/concerns addressed within OT scope of practice      Patient left seated in wheelchair for transport to CT with all lines intact, RN notified and transport and daughter present  Education:    Assessment:     Missy Ennis is a 68 y.o. female with a medical diagnosis of Cerebellar stroke, acute.  She presents with the following performance deficits affecting function are weakness, impaired endurance, impaired self care skills, impaired functional mobilty, gait instability, impaired balance, impaired  cognition, decreased coordination, impaired coordination, impaired fine motor, decreased upper extremity function, decreased lower extremity function, impaired cardiopulmonary response to activity. Pt d/c to Rehab on this date. Session short 2/2 transport arrival for CT scan.     Rehab Prognosis:  Good; patient would benefit from acute skilled OT services to address these deficits and reach maximum level of function.       Plan:     · Patient d/c to rehab on this date. Plan of Care Reviewed with: patient    This Plan of care has been discussed with the patient who was involved in its development and understands and is in agreement with the identified goals and treatment plan    GOALS:   Multidisciplinary Problems     Occupational Therapy Goals     Not on file          Multidisciplinary Problems (Resolved)        Problem: Occupational Therapy Goal    Goal Priority Disciplines Outcome Interventions   Occupational Therapy Goal   (Resolved)     OT, PT/OT Outcome(s) achieved    Description:  Goals to be met by: 12/16     Patient will increase functional independence with ADLs by performing:    Bed mobility and supine to sit with CGA and HOB flat. MET  UE Dressing with Set-up Assistance and Supervision. Not met  LE Dressing (pants, brief) with Set-up Assistance and Stand-by Assistance. Not met  Grooming while standing at sink with Stand-by Assistance. Not met  Toileting from toilet with Stand-by Assistance for hygiene and clothing management. Not met  Toilet transfer to toilet with Stand-by Assistance. Not met  Functional mobility at household distance for ADL task with CGA and AD as needed.-MET  Pt/CG verbalized understanding for s/s of stroke. Not met  Pt will follow multi step commands without added cues or time. met  Pt will complete hand eye coordination task for L UE with 4/5 accuracy. Not met                        Time Tracking:     OT Date of Treatment: 12/10/18  OT Start Time: 1130  OT Stop Time: 1140  OT Total  Time (min): 10 min    Billable Minutes:Self Care/Home Management 10    RAYO Parker  12/10/2018

## 2018-12-10 NOTE — PROGRESS NOTES
[facility]  Neurosurgery  Progress Note    Subjective:     History of Present Illness: 67 y/o female with PMHx of HTN, DM and prior stroke who had presented with worsening dizziness and N/V found to have and acute infarct Left cerebellum.  HCT without hydrocephalus. She has also been have episodes of falling and not knowing why, she has seen neurologist to get checked out for seizure activity and was placed on Vimpat. Neurosurgery consulted for evaluation. Pt takes 81 aspirin at home.            Post-Op Info:  * No surgery found *         Interval History:   NAEON. Patient resting comfortably in bed. Denies headaches, dizziness, N/V, worsening weakness, or vision changes. Continues to report difficulty with gait and balance.     Medications:  Continuous Infusions:   sodium chloride 0.9%       Scheduled Meds:   aspirin  81 mg Oral Daily    atorvastatin  40 mg Oral Daily    carvedilol  6.25 mg Oral BID WM    clopidogrel  75 mg Oral Daily    DULoxetine  60 mg Oral Daily    gabapentin  300 mg Oral BID    heparin (porcine)  5,000 Units Subcutaneous Q8H    lacosamide  150 mg Oral Daily    levothyroxine  100 mcg Oral Before breakfast    lisinopril  5 mg Oral Daily    multivit-iron-FA-calcium-mins  1 tablet Oral Daily    sodium chloride 0.9%  3 mL Intravenous Q8H     PRN Meds:acetaminophen, amitriptyline, dextrose 50%, dextrose 50%, diphenhydrAMINE, glucagon (human recombinant), glucose, glucose, influenza, insulin aspart U-100, ondansetron, pneumoc 13-devora conj-dip cr(PF), polyethylene glycol, sodium chloride 0.9%     Review of Systems  Objective:     Weight: 90.1 kg (198 lb 10.2 oz)  Body mass index is 34.63 kg/m².  Vital Signs (Most Recent):  Temp: 97.9 °F (36.6 °C) (12/10/18 0810)  Pulse: 80 (12/10/18 0810)  Resp: 18 (12/10/18 0810)  BP: 124/66 (12/10/18 0810)  SpO2: (!) 94 % (12/10/18 0810) Vital Signs (24h Range):  Temp:  [95.5 °F (35.3 °C)-97.9 °F (36.6 °C)] 97.9 °F (36.6 °C)  Pulse:   [69-80] 80  Resp:  [18-20] 18  SpO2:  [91 %-97 %] 94 %  BP: (124-163)/(66-70) 124/66        Neurosurgery Physical Exam   General: well developed, well nourished, no distress.   Head: normocephalic, atraumatic  Neurologic: Alert and oriented. Thought content appropriate.  GCS: Motor: 6/Verbal: 5/Eyes: 4 GCS Total: 15  Mental Status: Awake, Alert, Oriented x 4  Language: No aphasia  Speech: No dysarthria  Cranial nerves: face symmetric, tongue midline, CN II-XII grossly intact.   Eyes: pupils equal, round, reactive to light, EOMI.  Pulmonary: normal respirations, no signs of respiratory distress  Sensory: intact to light touch throughout    Motor Strength: Moves all extremities spontaneously with good tone.  No abnormal movements seen.      Strength   Deltoids Triceps Biceps Wrist Extension Wrist Flexion Hand    Upper: R 5/5 5/5 5/5 5/5 5/5 5/5     L 5/5 5/5 5/5 5/5 5/5 5/5       Iliopsoas Quadriceps Knee  Flexion Tibialis  anterior Gastro- cnemius EHL   Lower: R 5/5 5/5 5/5 5/5 5/5 5/5     L 4+/5 5/5 4+/5 5-/5 5-/5 5/5      Pronator Drift: no drift noted  Finger-to-nose: mild dysmetria on left side   Clonus: absent  Babinski: absent  Skin: Skin is warm, dry and intact.          Significant Labs:  Recent Labs   Lab 12/09/18  0548   *      K 4.5      CO2 29   BUN 16   CREATININE 0.8   CALCIUM 10.1     Recent Labs   Lab 12/09/18  0548   WBC 8.07   HGB 14.0   HCT 43.1            Assessment/Plan:     * Cerebellar stroke, acute    69 yo F with acute left cerebellar stroke    - Patient remains neurologically stable on exam  - MRA Brain & Neck 12/7 with abrupt termination of the distal left superior cerebellar artery and remainder of vasculature without high-grade stenosis or vessel occlusion.   - Continue ASA, plavix, and statin per Vascular Neurology  - Patient without symptoms of obstructive hydrocephalus but will get one final HCT today to ensure the 4th ventricle remains open.   - Continue  care coordination per primary team  - No outpatient neurosurgery follow up indicated unless there are detrimental findings on the head CT         Discussed with Dr. Chacko  Please call with any questions      Sima Plaza PA-C   Neurosurgery   Pager: [phone]

## 2018-12-10 NOTE — SUBJECTIVE & OBJECTIVE
Neurologic Chief Complaint: Cerebellar stroke, acute    Subjective:     Interval History: Patient is seen for follow-up neurological assessment and treatment recommendations:  left cerebellar stroke currently on DAPT and statin. Overnight patient was afebrile. BP ranging between 124//68. 24 hour EEG results showed no seizure like activity. PT/OT has stated that the patient will benefit from inpatient rehab, she will be discharged today.    Strength 5/5 in both upper and lower extremities. Patient had no significant complaints today.     HPI, Past Medical, Family, and Social History remains the same as documented in the initial encounter.     Review of Systems   Constitutional: Negative for fever.   Respiratory: Negative for chest tightness and shortness of breath.    Cardiovascular: Negative for chest pain and leg swelling.   Gastrointestinal: Negative for nausea and vomiting.   Neurological: Positive for weakness (left arm). Negative for dizziness and headaches.   Psychiatric/Behavioral: Negative for agitation, behavioral problems and confusion.     Neurologic Chief Complaint: Patient presented with dizziness and nausea/vomiting found to have acute infarct in the left superior cerebellar artery with small lacunar infarcts in the posterior cerebellum.       Scheduled Meds:   aspirin  81 mg Oral Daily    atorvastatin  40 mg Oral Daily    carvedilol  6.25 mg Oral BID WM    clopidogrel  75 mg Oral Daily    DULoxetine  60 mg Oral Daily    gabapentin  300 mg Oral BID    heparin (porcine)  5,000 Units Subcutaneous Q8H    lacosamide  150 mg Oral Daily    levothyroxine  100 mcg Oral Before breakfast    lisinopril  5 mg Oral Daily    multivit-iron-FA-calcium-mins  1 tablet Oral Daily    sodium chloride 0.9%  3 mL Intravenous Q8H     Continuous Infusions:   sodium chloride 0.9%       PRN Meds:acetaminophen, amitriptyline, dextrose 50%, dextrose 50%, diphenhydrAMINE, glucagon (human recombinant), glucose,  "glucose, insulin aspart U-100, ondansetron, pneumoc 13-devora conj-dip cr(PF), polyethylene glycol, sodium chloride 0.9%    Objective:     Vital Signs (Most Recent):  BP (!) 145/67 (BP Location: Left arm, Patient Position: Sitting)   Pulse 83   Temp 97.5 °F (36.4 °C) (Oral)   Resp 18   Ht 5' 3.5" (1.613 m)   Wt 90.1 kg (198 lb 10.2 oz)   LMP 04/18/2016   SpO2 97%   Breastfeeding? No   BMI 34.63 kg/m²     Vital Signs Range (Last 24H):  Temp:  [95.5 °F (35.3 °C)-97.9 °F (36.6 °C)]   Pulse:  [69-83]   Resp:  [18-20]   BP: (124-163)/(66-70)   SpO2:  [91 %-97 %]   BP Location: Left arm    Physical Exam   Constitutional: She is oriented to person, place, and time. She appears well-developed and well-nourished.   HENT:   Head: Normocephalic and atraumatic.   Eyes: EOM are normal. Pupils are equal, round, and reactive to light.   Neck: Normal range of motion.   Cardiovascular: Normal rate.   Pulmonary/Chest: Effort normal.   Abdominal: Soft.   Musculoskeletal: Normal range of motion.   Neurological: She is alert and oriented to person, place, and time.   Skin: Skin is warm and dry.   Nursing note and vitals reviewed.      Neurological Exam:   LOC: alert  Language: No aphasia  EOM (CN III, IV, VI): Full/intact  Pupils (CN II, III): PERRL  Reflexes: 2+ throughout  Motor: Arm right  Paresis: 5/5  Leg right Paresis: 5/5  Cebellar: Upper Extremity Appendicular Ataxia (Finger Nose Finger)  Left  Tone: Normal tone throughout    Laboratory:  CMP:   No results for input(s): GLUCOSE, CALCIUM, ALBUMIN, PROT, NA, K, CO2, CL, BUN, CREATININE, ALKPHOS, ALT, AST, BILITOT in the last 24 hours.  BMP:   Recent Labs   Lab 12/09/18  0548      K 4.5      CO2 29   BUN 16   CREATININE 0.8   CALCIUM 10.1     CBC:   Recent Labs   Lab 12/09/18  0548   WBC 8.07   RBC 4.40   HGB 14.0   HCT 43.1      MCV 98   MCH 31.8*   MCHC 32.5       Diagnostic Results     Brain Imaging       Geographic area T2/FLAIR signal hyperintensity " with diffusion restriction and low signal on ADC without enhancement likely corresponds to an acute infarct in the distribution of the left superficial cerebellar artery.  Also there are small remote lacunar-type infarcts posterior cerebellum bilaterally.    Additionally there is a nonspecific area of nonenhancing heterogenous signal intensity located in the right posterior occipital lobe and posterior parietal lobe.    In the right frontal lobe there is a nonenhancing predominantly T1 and T2 FLAIR hypointense area measuring 3.4 x 1.9 cm which demonstrates surrounding increased T2 FLAIR signal in the surrounding white matter without mass effect.  No midline shift. No hemorrhage seen.  No evidence of hydrocephalus.  No extra-axial fluid collections.  Visualized major vascular flow voids are preserved.  Bone marrow signal intensity appears normal.    Vessel Imaging   MRA Neck and Brain  -Abrupt termination of the distal left superior cerebellar artery.  -The remainder of the cervical and intracranial vasculature appears within normal limits, allowing for motion.  No high-grade stenosis or large vessel occlusion identified elsewhere.  Cardiac Imaging   · The aortic root is mildly dilated, measuring 3.8 cm.  · Normal left ventricular systolic function. The estimated ejection fraction is 65%  · Normal right ventricular systolic function.  · No wall motion abnormalities.  · Indeterminate left ventricular diastolic function.  · Normal central venous pressure (3 mm Hg).  · The estimated PA systolic pressure is 20.47 mm Hg  · Trivial pericardial effusion.

## 2018-12-10 NOTE — PLAN OF CARE
Problem: SLP Goal  Goal: SLP Goal  Goals due 12/12  1.  Tolerate regular diet with thin liquids with no s/s of aspiration  2.  Assess functional reading and writing skills  3.  Recall speech strategies with no cues  4.  Respond to mental manipulation tasks with 90% accuracy    Outcome: Ongoing (interventions implemented as appropriate)  Pt progressing towards goals.    Arabella Barlow MA/SELENA-SLP  Speech Language Pathologist  Pager [phone]  12/10/2018

## 2018-12-10 NOTE — ASSESSMENT & PLAN NOTE
67 y/o female with left cerebellar stroke acute infarct in the left superior cerebellar artery with small lacunar infarcts in the posterior cerebellum. There is also an area of  nonenhancing heterogenous signal intensity located in the right posterior occipital lobe and posterior parietal lobe. There is questionable seizure like activity in patient, thus patient has underwent a 24 hr EEG which shows no seizure like activity. Loop recorder showed no cardiac events after patient had stroke.      Antithrombotics: aspirin 81 mg daily,clopidogrel 75 mg    Statins: Lipitor 40 mg daily    Aggressive risk factor modification: HTN, DM     Rehab efforts: PT/OT/SLP to evaluate and treat- Patient approved for rehab facility.     Diagnostics ordered/pending: MRA of neck and brain negative for significant stenosis, TTE ordered and reported    VTE prophylaxis: Heparin 5000 units SQ every 8 hours, SCD's    BP parameters: Infarct: No intervention, SBP <220

## 2018-12-10 NOTE — PLAN OF CARE
Problem: Patient Care Overview  Goal: Plan of Care Review  Outcome: Ongoing (interventions implemented as appropriate)  No acute changes overnight; vitals stable for the shift; instructed on POC and progression toward goals; continue to monitor. Cam RAY.

## 2018-12-10 NOTE — PT/OT/SLP PROGRESS
"Speech Language Pathology Treatment    Patient Name:  Missy Ennis   MRN:  [id number]  Admitting Diagnosis: Cerebellar stroke, acute    Recommendations:                 General Recommendations:  Dysphagia therapy and Speech/language therapy  Diet recommendations:  Regular, Liquid Diet Level: Thin   Aspiration Precautions: HOB to 90 degrees, No straws and Small bites/sips   General Precautions: Standard, aspiration, fall  Communication strategies:  none    Subjective     "I am going today"  Per pt  Patient goals: rehab  Pain/Comfort:  · Pain Rating 1: 0/10  · Pain Rating Post-Intervention 1: 0/10    Objective:     Has the patient been evaluated by SLP for swallowing?   Yes  Keep patient NPO? No   Current Respiratory Status: room air      Pt. Seen while sitting on side of bed and recalled swallow strategies with no cues needed.  Pt. Was observed swallowing water via cup.  No cues needed for pt to monitor sip size.  No coughing, throat clearing noted on sips of water via cup.  Swallow precautions were reviewed with good understanding verbalized.      Assessment:     Missy Ennis is a 68 y.o. female with an SLP diagnosis of Dysphagia and Dysarthria.      Goals:   Multidisciplinary Problems     SLP Goals        Problem: SLP Goal    Goal Priority Disciplines Outcome   SLP Goal     SLP Ongoing (interventions implemented as appropriate)   Description:  Goals due 12/12  1.  Tolerate regular diet with thin liquids with no s/s of aspiration  2.  Assess functional reading and writing skills  3.  Recall speech strategies with no cues  4.  Respond to mental manipulation tasks with 90% accuracy                     Plan:     · Patient to be seen:  4 x/week   · Plan of Care expires:  01/03/19  · Plan of Care reviewed with:  patient   · SLP Follow-Up:  Yes       Discharge recommendations:  rehabilitation facility       Time Tracking:     SLP Treatment Date:   12/10/18  Speech Start Time:  1120  Speech Stop Time:  1130     Speech " Total Time (min):  10 min    Billable Minutes: Treatment Swallowing Dysfunction 10    Arabella Barlow MA, CCC-SLP  12/10/2018

## 2018-12-11 NOTE — PT/OT/SLP DISCHARGE
Physical Therapy Discharge Summary    Name: Missy Ennis  MRN: [id number]   Principal Problem: Cerebellar stroke, acute     Patient Discharged from acute Physical Therapy on 12/10/2018.  Please refer to prior PT noted date on 2018 for functional status.     Assessment:     Goals partially met. Patient appropriate for care in another setting.    Objective:     GOALS:   Multidisciplinary Problems     Physical Therapy Goals     Not on file          Multidisciplinary Problems (Resolved)        Problem: Physical Therapy Goal    Goal Priority Disciplines Outcome Goal Variances Interventions   Physical Therapy Goal   (Resolved)     PT, PT/OT Outcome(s) achieved     Description:  Goals to be met by: 1 week ()     Patient will increase functional independence with mobility by performin. Supine to sit with supervision. Not met  2. Sit to supine with supervision. Not met  3. Sit to stand transfer with Stand-by Assistance. Not met  4. Bed to chair transfer with Contact Guard Assistance using Rolling Walker. Not met  5. Gait  x  75 feet with Contact guard assistance using Rolling Walker.  Not met  6. Ascend/descend 3 stairs with bilateral Handrails Contact Guard Assistance. Not met  7. Stand for 3 minutes with Contact Guard Assistance using Rolling Walker while performing a task. Not met  8. Lower extremity exercise program x 20 reps per handout, with independence. Not met                      Reasons for Discontinuation of Therapy Services  Transfer to alternate level of care.      Plan:     Patient Discharged to: Inpatient Rehab.    Lizzeth Shi, PT  2018

## 2019-01-03 ENCOUNTER — TELEPHONE (OUTPATIENT)
Dept: NEUROLOGY | Facility: CLINIC | Age: 69
End: 2019-01-03

## 2019-01-03 NOTE — TELEPHONE ENCOUNTER
----- Message from Cristel Laws RN sent at 12/10/2018 12:55 PM CST -----  Please schedule a neuro followup appt for 4-6 weeks. Patient was inpatient and seen by Dr Finney. Please contact patient with date and time of appt.

## 2019-05-06 PROBLEM — E78.2 MIXED HYPERLIPIDEMIA: Status: ACTIVE | Noted: 2019-05-06

## 2019-05-07 PROBLEM — E11.42 TYPE 2 DIABETES MELLITUS WITH DIABETIC POLYNEUROPATHY, WITHOUT LONG-TERM CURRENT USE OF INSULIN: Status: ACTIVE | Noted: 2019-05-07

## 2019-07-29 ENCOUNTER — TELEPHONE (OUTPATIENT)
Dept: RHEUMATOLOGY | Facility: CLINIC | Age: 69
End: 2019-07-29

## 2019-08-02 ENCOUNTER — OFFICE VISIT (OUTPATIENT)
Dept: RHEUMATOLOGY | Facility: CLINIC | Age: 69
End: 2019-08-02
Payer: MEDICARE

## 2019-08-02 VITALS
HEART RATE: 81 BPM | DIASTOLIC BLOOD PRESSURE: 78 MMHG | WEIGHT: 221.13 LBS | HEIGHT: 64 IN | BODY MASS INDEX: 37.75 KG/M2 | SYSTOLIC BLOOD PRESSURE: 127 MMHG

## 2019-08-02 DIAGNOSIS — M70.61 TROCHANTERIC BURSITIS OF BOTH HIPS: Primary | ICD-10-CM

## 2019-08-02 DIAGNOSIS — M70.62 TROCHANTERIC BURSITIS OF BOTH HIPS: Primary | ICD-10-CM

## 2019-08-02 PROCEDURE — 3078F DIAST BP <80 MM HG: CPT | Mod: CPTII,GC,S$GLB, | Performed by: STUDENT IN AN ORGANIZED HEALTH CARE EDUCATION/TRAINING PROGRAM

## 2019-08-02 PROCEDURE — 3078F PR MOST RECENT DIASTOLIC BLOOD PRESSURE < 80 MM HG: ICD-10-PCS | Mod: CPTII,GC,S$GLB, | Performed by: STUDENT IN AN ORGANIZED HEALTH CARE EDUCATION/TRAINING PROGRAM

## 2019-08-02 PROCEDURE — 99212 OFFICE O/P EST SF 10 MIN: CPT | Mod: 25,GC,S$GLB, | Performed by: STUDENT IN AN ORGANIZED HEALTH CARE EDUCATION/TRAINING PROGRAM

## 2019-08-02 PROCEDURE — 1101F PT FALLS ASSESS-DOCD LE1/YR: CPT | Mod: CPTII,GC,S$GLB, | Performed by: STUDENT IN AN ORGANIZED HEALTH CARE EDUCATION/TRAINING PROGRAM

## 2019-08-02 PROCEDURE — 1101F PR PT FALLS ASSESS DOC 0-1 FALLS W/OUT INJ PAST YR: ICD-10-PCS | Mod: CPTII,GC,S$GLB, | Performed by: STUDENT IN AN ORGANIZED HEALTH CARE EDUCATION/TRAINING PROGRAM

## 2019-08-02 PROCEDURE — 3074F PR MOST RECENT SYSTOLIC BLOOD PRESSURE < 130 MM HG: ICD-10-PCS | Mod: CPTII,GC,S$GLB, | Performed by: STUDENT IN AN ORGANIZED HEALTH CARE EDUCATION/TRAINING PROGRAM

## 2019-08-02 PROCEDURE — 20610 DRAIN/INJ JOINT/BURSA W/O US: CPT | Mod: RT,GC,S$GLB, | Performed by: STUDENT IN AN ORGANIZED HEALTH CARE EDUCATION/TRAINING PROGRAM

## 2019-08-02 PROCEDURE — 3074F SYST BP LT 130 MM HG: CPT | Mod: CPTII,GC,S$GLB, | Performed by: STUDENT IN AN ORGANIZED HEALTH CARE EDUCATION/TRAINING PROGRAM

## 2019-08-02 PROCEDURE — 99999 PR PBB SHADOW E&M-EST. PATIENT-LVL V: ICD-10-PCS | Mod: PBBFAC,GC,, | Performed by: STUDENT IN AN ORGANIZED HEALTH CARE EDUCATION/TRAINING PROGRAM

## 2019-08-02 PROCEDURE — 99212 PR OFFICE/OUTPT VISIT, EST, LEVL II, 10-19 MIN: ICD-10-PCS | Mod: 25,GC,S$GLB, | Performed by: STUDENT IN AN ORGANIZED HEALTH CARE EDUCATION/TRAINING PROGRAM

## 2019-08-02 PROCEDURE — 99999 PR PBB SHADOW E&M-EST. PATIENT-LVL V: CPT | Mod: PBBFAC,GC,, | Performed by: STUDENT IN AN ORGANIZED HEALTH CARE EDUCATION/TRAINING PROGRAM

## 2019-08-02 PROCEDURE — 20610 LARGE JOINT ASPIRATION/INJECTION: R GREATER TROCHANTERIC BURSA: ICD-10-PCS | Mod: RT,GC,S$GLB, | Performed by: STUDENT IN AN ORGANIZED HEALTH CARE EDUCATION/TRAINING PROGRAM

## 2019-08-02 RX ORDER — TRIAMCINOLONE ACETONIDE 40 MG/ML
40 INJECTION, SUSPENSION INTRA-ARTICULAR; INTRAMUSCULAR
Status: DISCONTINUED | OUTPATIENT
Start: 2019-08-02 | End: 2019-08-02 | Stop reason: HOSPADM

## 2019-08-02 RX ADMIN — TRIAMCINOLONE ACETONIDE 40 MG: 40 INJECTION, SUSPENSION INTRA-ARTICULAR; INTRAMUSCULAR at 09:08

## 2019-08-02 NOTE — PROGRESS NOTES
"/78 (BP Method: Large (Automatic))   Pulse 81   Ht 5' 3.5" (1.613 m)   Wt 100.3 kg (221 lb 1.9 oz)   LMP 04/18/2016   BMI 38.56 kg/m²   Large Joint Aspiration/Injection: R greater trochanteric bursa  Date/Time: 8/2/2019 9:05 AM  Performed by: Evan Sanchez MD  Authorized by: Kali Llamas MD     Consent Done?:  Yes (Verbal)  Indications:  Pain  Procedure site marked: Yes    Timeout: Prior to procedure the correct patient, procedure, and site was verified      Location:  Hip  Site:  R greater trochanteric bursa  Prep: Patient was prepped and draped in usual sterile fashion    Ultrasonic Guidance for needle placement: No  Needle size:  25 G  Approach:  Lateral  Medications:  40 mg triamcinolone acetonide 40 mg/mL  Patient tolerance:  Patient tolerated the procedure well with no immediate complications    Additional Comments: Physical therapy ordered  Repeat XRAYS ordered, last in 2017 (showing chronic bursitis changes and mild dejenerative changes)   Patient is a diabetic, will only inject Right side at this time. Appt for injection in 4-6 weeks for left side if needed.           "

## 2019-08-02 NOTE — PROGRESS NOTES
The right trochanteric bursal injection performed by Dr. Sanchez personally witnessed and supervised. JUVENCIO

## 2019-08-26 ENCOUNTER — TELEPHONE (OUTPATIENT)
Dept: RHEUMATOLOGY | Facility: CLINIC | Age: 69
End: 2019-08-26

## 2019-08-26 NOTE — TELEPHONE ENCOUNTER
Spoke with the daughter April and let her know that if we rescheduled the pt she would be seen in december

## 2019-08-26 NOTE — TELEPHONE ENCOUNTER
----- Message from Beata Nunez sent at 8/26/2019  7:55 AM CDT -----  Contact: self  Pt is calling in regards to rescheduling her appt on 08/30. She would like to reschedule to 08/29, if possible.    She can be reached at [phone].    Thank you

## 2019-08-30 ENCOUNTER — PATIENT MESSAGE (OUTPATIENT)
Dept: RHEUMATOLOGY | Facility: CLINIC | Age: 69
End: 2019-08-30

## 2019-08-30 ENCOUNTER — HOSPITAL ENCOUNTER (OUTPATIENT)
Dept: RADIOLOGY | Facility: HOSPITAL | Age: 69
Discharge: HOME | End: 2019-08-30
Attending: STUDENT IN AN ORGANIZED HEALTH CARE EDUCATION/TRAINING PROGRAM
Payer: MEDICARE

## 2019-08-30 ENCOUNTER — OFFICE VISIT (OUTPATIENT)
Dept: RHEUMATOLOGY | Facility: CLINIC | Age: 69
End: 2019-08-30
Payer: MEDICARE

## 2019-08-30 VITALS
HEART RATE: 62 BPM | BODY MASS INDEX: 37.73 KG/M2 | WEIGHT: 221 LBS | HEIGHT: 64 IN | SYSTOLIC BLOOD PRESSURE: 141 MMHG | DIASTOLIC BLOOD PRESSURE: 86 MMHG

## 2019-08-30 DIAGNOSIS — M70.61 TROCHANTERIC BURSITIS OF BOTH HIPS: Primary | ICD-10-CM

## 2019-08-30 DIAGNOSIS — M70.62 TROCHANTERIC BURSITIS OF BOTH HIPS: Primary | ICD-10-CM

## 2019-08-30 PROCEDURE — 3077F PR MOST RECENT SYSTOLIC BLOOD PRESSURE >= 140 MM HG: ICD-10-PCS | Mod: CPTII,GC,S$GLB, | Performed by: STUDENT IN AN ORGANIZED HEALTH CARE EDUCATION/TRAINING PROGRAM

## 2019-08-30 PROCEDURE — 99999 PR PBB SHADOW E&M-EST. PATIENT-LVL IV: ICD-10-PCS | Mod: PBBFAC,GC,, | Performed by: STUDENT IN AN ORGANIZED HEALTH CARE EDUCATION/TRAINING PROGRAM

## 2019-08-30 PROCEDURE — 20610 DRAIN/INJ JOINT/BURSA W/O US: CPT | Mod: LT,GC,S$GLB, | Performed by: STUDENT IN AN ORGANIZED HEALTH CARE EDUCATION/TRAINING PROGRAM

## 2019-08-30 PROCEDURE — 73521 X-RAY EXAM HIPS BI 2 VIEWS: CPT | Mod: TC

## 2019-08-30 PROCEDURE — 1101F PT FALLS ASSESS-DOCD LE1/YR: CPT | Mod: CPTII,GC,S$GLB, | Performed by: STUDENT IN AN ORGANIZED HEALTH CARE EDUCATION/TRAINING PROGRAM

## 2019-08-30 PROCEDURE — 1101F PR PT FALLS ASSESS DOC 0-1 FALLS W/OUT INJ PAST YR: ICD-10-PCS | Mod: CPTII,GC,S$GLB, | Performed by: STUDENT IN AN ORGANIZED HEALTH CARE EDUCATION/TRAINING PROGRAM

## 2019-08-30 PROCEDURE — 73521 X-RAY EXAM HIPS BI 2 VIEWS: CPT | Mod: 26,,, | Performed by: RADIOLOGY

## 2019-08-30 PROCEDURE — 3079F DIAST BP 80-89 MM HG: CPT | Mod: CPTII,GC,S$GLB, | Performed by: STUDENT IN AN ORGANIZED HEALTH CARE EDUCATION/TRAINING PROGRAM

## 2019-08-30 PROCEDURE — 20610 LARGE JOINT ASPIRATION/INJECTION: L GREATER TROCHANTERIC BURSA: ICD-10-PCS | Mod: LT,GC,S$GLB, | Performed by: STUDENT IN AN ORGANIZED HEALTH CARE EDUCATION/TRAINING PROGRAM

## 2019-08-30 PROCEDURE — 99999 PR PBB SHADOW E&M-EST. PATIENT-LVL IV: CPT | Mod: PBBFAC,GC,, | Performed by: STUDENT IN AN ORGANIZED HEALTH CARE EDUCATION/TRAINING PROGRAM

## 2019-08-30 PROCEDURE — 73521 XR HIPS BILATERAL 2 VIEW INCL AP PELVIS: ICD-10-PCS | Mod: 26,,, | Performed by: RADIOLOGY

## 2019-08-30 PROCEDURE — 3077F SYST BP >= 140 MM HG: CPT | Mod: CPTII,GC,S$GLB, | Performed by: STUDENT IN AN ORGANIZED HEALTH CARE EDUCATION/TRAINING PROGRAM

## 2019-08-30 PROCEDURE — 3079F PR MOST RECENT DIASTOLIC BLOOD PRESSURE 80-89 MM HG: ICD-10-PCS | Mod: CPTII,GC,S$GLB, | Performed by: STUDENT IN AN ORGANIZED HEALTH CARE EDUCATION/TRAINING PROGRAM

## 2019-08-30 RX ORDER — TRIAMCINOLONE ACETONIDE 40 MG/ML
40 INJECTION, SUSPENSION INTRA-ARTICULAR; INTRAMUSCULAR
Status: DISCONTINUED | OUTPATIENT
Start: 2019-08-30 | End: 2019-08-30 | Stop reason: HOSPADM

## 2019-08-30 RX ADMIN — TRIAMCINOLONE ACETONIDE 40 MG: 40 INJECTION, SUSPENSION INTRA-ARTICULAR; INTRAMUSCULAR at 09:08

## 2019-08-30 NOTE — PROGRESS NOTES
Large Joint Aspiration/Injection: L greater trochanteric bursa  Date/Time: 8/30/2019 9:27 AM  Performed by: Evan Sanchez MD  Authorized by: Kali Llamas MD     Consent Done?:  Yes (Verbal)  Indications:  Pain  Procedure site marked: Yes    Timeout: Prior to procedure the correct patient, procedure, and site was verified    Anesthesia  Local anesthesia used  Anesthetic: lidocaine 1% without epinephrine  Anesthetic total: 1mL    Location:  Hip  Site:  L greater trochanteric bursa  Needle size:  25 G  Medications:  40 mg triamcinolone acetonide 40 mg/mL  Patient tolerance:  Patient tolerated the procedure well with no immediate complications        Answers for HPI/ROS submitted by the patient on 8/30/2019   fever: No  eye redness: No  headaches: No  shortness of breath: No  chest pain: No      Patient with left hip bursitis. Will inject today.   XRAYs of b/l hips ordered last visit, not done, will reschedule   PT orders printed for external orders. Again.   trouble swallowing: No  diarrhea: No  constipation: No  unexpected weight change: No  genital sore: No  dysuria: No  During the last 3 days, have you had a skin rash?: No  Bruises or bleeds easily: No  cough: No

## 2019-08-30 NOTE — PROGRESS NOTES
The left trochanteric bursal injection performed by Dr. Sanchez personally witnessed and supervised. She will get x-rays of hips and pelvis and start PT as previously ordered. JUVENCIO

## 2020-02-20 PROBLEM — E66.01 CLASS 2 SEVERE OBESITY DUE TO EXCESS CALORIES WITH SERIOUS COMORBIDITY IN ADULT: Status: ACTIVE | Noted: 2020-02-20

## 2020-02-20 PROBLEM — G47.33 OSA (OBSTRUCTIVE SLEEP APNEA): Status: ACTIVE | Noted: 2020-02-20

## 2020-02-20 PROBLEM — J44.9 MODERATE COPD (CHRONIC OBSTRUCTIVE PULMONARY DISEASE): Status: ACTIVE | Noted: 2020-02-20

## 2021-05-04 ENCOUNTER — PATIENT MESSAGE (OUTPATIENT)
Dept: RESEARCH | Facility: HOSPITAL | Age: 71
End: 2021-05-04

## 2021-05-15 PROBLEM — R47.81 SLURRED SPEECH: Status: ACTIVE | Noted: 2021-05-15

## 2021-05-15 PROBLEM — I63.9 CVA (CEREBRAL VASCULAR ACCIDENT): Status: ACTIVE | Noted: 2021-05-15

## 2021-05-25 PROBLEM — R47.9 DIFFICULTY WITH SPEECH: Status: ACTIVE | Noted: 2021-05-25

## 2021-05-26 PROBLEM — I63.9 STROKE: Status: RESOLVED | Noted: 2018-12-04 | Resolved: 2021-05-26

## 2021-05-26 PROBLEM — I63.9 STROKE: Status: ACTIVE | Noted: 2021-05-26

## 2021-05-28 PROBLEM — G40.901 STATUS EPILEPTICUS: Status: ACTIVE | Noted: 2021-05-28

## 2021-05-31 PROBLEM — I69.391 DYSPHAGIA STATUS POST CEREBROVASCULAR ACCIDENT: Status: ACTIVE | Noted: 2021-05-31

## 2021-06-01 PROBLEM — R63.8 ALTERATION IN NUTRITION: Status: ACTIVE | Noted: 2021-06-01

## 2021-06-17 NOTE — PROCEDURES
DATE OF SERVICE:  12/05/2018 and 12/06/2018    ELECTROENCEPHALOGRAM REPORT    Extended Recording    METHODOLOGY:  Electroencephalographic (EEG) is recorded with electrodes placed   according to the International 10-20 placement system.  Thirty two (32) channels   of digital signal (sampling rate of 512/sec), including T1 and T2, were   simultaneously recorded from the scalp and may include EKG, EMG, and/or eye   monitors.  Recording band pass was 0.1 to 512 Hz.  Digital video recording of   the patient is simultaneously recorded with the EEG.  The patient is instructed   to report clinical symptoms which may occur during the recording session.  EEG   and video recording are stored and archived in digital format.  Activation   procedures, which include photic stimulation, hyperventilation and instructing   patients to perform simple tasks, are done in selected patients.    The EEG is displayed on a monitor screen and can be reviewed using different   montages.  Computer-assisted analysis is employed to detect spike and   electrographic seizure activity.  The entire record is submitted for computer   analysis.  The entire recording is visually reviewed, and the times identified   by computer analysis as being spikes or seizures are reviewed again.    Compressed spectral analysis (CSA) is also performed on the activity recorded   from each individual channel.  This is displayed as a power display of   frequencies from 0 to 30 Hz over time.  The CSA is reviewed looking for   asymmetries in power between homologous areas of the scalp, then compared with   the original EEG recording.    [employer] software was also utilized in the review of this study.  This software   suite analyzes the EEG recording in multiple domains.  Coherence and rhythmicity   are computed to identify EEG sections which may contain organized seizures.    Each channel undergoes analysis to detect the presence of spike and sharp waves   which have  Urine dip and c/s  Start macrobid 100 mg I po bid x 7 day  Supportive care discussed to help alleviate symptoms  Please call if symptoms worsen or are not resolving. anusol cream bid  Please call if symptoms worsen or are not resolving. special and morphological characteristics of epileptic activity.  The   routine EEG recording is converted from special into frequency domain.  This is   then displayed comparing homologous areas to identify areas of significant   asymmetry.  Algorithm to identify non-cortically generated artifact is used to   separate artifact from the EEG.    RECORDING TIMES:  The study begins on 12/05/2018 at 12:12 and pauses on   12/05/2018 at 17:44.  The study resumes on 12/06/2018 at 09:38 and concludes on   12/06/2018 at 09:40.  Total recording duration is 5 hours and 41 minutes.    EEG FINDINGS:  At baseline, this study consists of medium mixed frequency   activity with a 10 Hz posterior dominant rhythm seen in the posterior channels   and predominantly beta with admixed alpha seen anteriorly.  There is good   variability in the record.  There is abundant EMG artifact throughout the waking   record.  There is page to page fluctuation and a few theta frequencies are seen   scattered during wakefulness.  Sleep is not reliably captured.  There is no   asymmetry.  There are no seizures.  There are no focal findings or epileptiform   discharges.    INTERPRETATION:  Essentially normal awake hospital EEG.  The patient did not   tolerate recording overnight and electrodes were disconnected for a portion of   this recording, but recorded portions were normal.      NBB/IN  dd: 12/06/2018 13:26:47 (CST)  td: 12/06/2018 13:43:09 (CST)  Doc ID   #[id number]  Job ID #[id number]    CC:    Stable

## 2021-09-29 PROBLEM — R13.19 DYSPHAGIA, NEUROLOGIC: Status: ACTIVE | Noted: 2021-09-29

## 2022-08-12 PROBLEM — M17.11 OSTEOARTHRITIS OF RIGHT KNEE: Status: ACTIVE | Noted: 2022-08-12

## 2022-08-25 PROBLEM — I69.391 DYSPHAGIA STATUS POST CEREBROVASCULAR ACCIDENT: Status: RESOLVED | Noted: 2021-05-31 | Resolved: 2022-08-25
